# Patient Record
Sex: FEMALE | Race: WHITE | Employment: OTHER | URBAN - METROPOLITAN AREA
[De-identification: names, ages, dates, MRNs, and addresses within clinical notes are randomized per-mention and may not be internally consistent; named-entity substitution may affect disease eponyms.]

---

## 2017-01-01 ENCOUNTER — PATIENT OUTREACH (OUTPATIENT)
Dept: CASE MANAGEMENT | Age: 72
End: 2017-01-01

## 2017-01-01 ENCOUNTER — TELEPHONE (OUTPATIENT)
Dept: CARDIOLOGY CLINIC | Age: 72
End: 2017-01-01

## 2017-01-01 ENCOUNTER — HOSPITAL ENCOUNTER (INPATIENT)
Age: 72
LOS: 7 days | Discharge: NURSING, REHAB OR LONG-TERM CARE | DRG: 871 | End: 2017-09-16
Attending: EMERGENCY MEDICINE | Admitting: INTERNAL MEDICINE
Payer: MEDICARE

## 2017-01-01 ENCOUNTER — CLINICAL SUPPORT (OUTPATIENT)
Dept: CARDIOLOGY CLINIC | Age: 72
End: 2017-01-01

## 2017-01-01 ENCOUNTER — OFFICE VISIT (OUTPATIENT)
Dept: CARDIOLOGY CLINIC | Age: 72
End: 2017-01-01

## 2017-01-01 ENCOUNTER — HOSPITAL ENCOUNTER (OUTPATIENT)
Dept: LAB | Age: 72
Discharge: HOME | End: 2017-07-10
Payer: MEDICARE

## 2017-01-01 ENCOUNTER — APPOINTMENT (OUTPATIENT)
Dept: GENERAL RADIOLOGY | Age: 72
DRG: 871 | End: 2017-01-01
Attending: EMERGENCY MEDICINE
Payer: MEDICARE

## 2017-01-01 ENCOUNTER — APPOINTMENT (OUTPATIENT)
Dept: GENERAL RADIOLOGY | Age: 72
DRG: 871 | End: 2017-01-01
Attending: INTERNAL MEDICINE
Payer: MEDICARE

## 2017-01-01 VITALS
DIASTOLIC BLOOD PRESSURE: 73 MMHG | TEMPERATURE: 99.4 F | HEART RATE: 73 BPM | SYSTOLIC BLOOD PRESSURE: 136 MMHG | RESPIRATION RATE: 18 BRPM | HEIGHT: 66 IN | OXYGEN SATURATION: 97 % | WEIGHT: 173 LBS | BODY MASS INDEX: 27.8 KG/M2

## 2017-01-01 VITALS
HEART RATE: 73 BPM | HEIGHT: 66 IN | WEIGHT: 173 LBS | DIASTOLIC BLOOD PRESSURE: 56 MMHG | BODY MASS INDEX: 27.8 KG/M2 | SYSTOLIC BLOOD PRESSURE: 139 MMHG

## 2017-01-01 VITALS
HEART RATE: 73 BPM | DIASTOLIC BLOOD PRESSURE: 65 MMHG | WEIGHT: 156 LBS | BODY MASS INDEX: 25.07 KG/M2 | HEIGHT: 66 IN | SYSTOLIC BLOOD PRESSURE: 134 MMHG

## 2017-01-01 DIAGNOSIS — I34.2 NONRHEUMATIC MITRAL VALVE STENOSIS WITH INSUFFICIENCY: ICD-10-CM

## 2017-01-01 DIAGNOSIS — E78.2 MIXED HYPERLIPIDEMIA: ICD-10-CM

## 2017-01-01 DIAGNOSIS — I50.33 ACUTE ON CHRONIC DIASTOLIC CONGESTIVE HEART FAILURE (HCC): Primary | ICD-10-CM

## 2017-01-01 DIAGNOSIS — I25.10 ATHEROSCLEROSIS OF NATIVE CORONARY ARTERY OF NATIVE HEART WITHOUT ANGINA PECTORIS: ICD-10-CM

## 2017-01-01 DIAGNOSIS — I27.20 PULMONARY HTN (HCC): ICD-10-CM

## 2017-01-01 DIAGNOSIS — I48.0 PAROXYSMAL ATRIAL FIBRILLATION (HCC): Primary | ICD-10-CM

## 2017-01-01 DIAGNOSIS — I50.32 CHRONIC DIASTOLIC HEART FAILURE (HCC): Primary | ICD-10-CM

## 2017-01-01 DIAGNOSIS — R53.1 GENERALIZED WEAKNESS: ICD-10-CM

## 2017-01-01 DIAGNOSIS — I50.33 ACUTE ON CHRONIC DIASTOLIC CONGESTIVE HEART FAILURE (HCC): ICD-10-CM

## 2017-01-01 DIAGNOSIS — I34.0 NONRHEUMATIC MITRAL VALVE STENOSIS WITH INSUFFICIENCY: ICD-10-CM

## 2017-01-01 DIAGNOSIS — I10 ESSENTIAL HYPERTENSION, BENIGN: ICD-10-CM

## 2017-01-01 DIAGNOSIS — I35.0 NONRHEUMATIC AORTIC VALVE STENOSIS: ICD-10-CM

## 2017-01-01 DIAGNOSIS — I48.0 PAROXYSMAL ATRIAL FIBRILLATION (HCC): ICD-10-CM

## 2017-01-01 DIAGNOSIS — R50.9 ACUTE FEBRILE ILLNESS: ICD-10-CM

## 2017-01-01 DIAGNOSIS — I50.32 CHRONIC DIASTOLIC HEART FAILURE (HCC): ICD-10-CM

## 2017-01-01 DIAGNOSIS — K92.2 GASTROINTESTINAL HEMORRHAGE, UNSPECIFIED GASTROINTESTINAL HEMORRHAGE TYPE: Primary | ICD-10-CM

## 2017-01-01 DIAGNOSIS — M32.9 LUPUS (HCC): ICD-10-CM

## 2017-01-01 LAB
ABO + RH BLD: NORMAL
ALBUMIN SERPL-MCNC: 2.1 G/DL (ref 3.4–5)
ALBUMIN SERPL-MCNC: 2.2 G/DL (ref 3.4–5)
ALBUMIN/GLOB SERPL: 0.5 {RATIO} (ref 0.8–1.7)
ALBUMIN/GLOB SERPL: 0.6 {RATIO} (ref 0.8–1.7)
ALP SERPL-CCNC: 84 U/L (ref 45–117)
ALP SERPL-CCNC: 87 U/L (ref 45–117)
ALT SERPL-CCNC: 20 U/L (ref 13–56)
ALT SERPL-CCNC: 22 U/L (ref 13–56)
AMBIG ABBREV BMP8 DEFAULT, 977205: NORMAL
AMORPH CRY URNS QL MICRO: ABNORMAL
ANA SER QL: POSITIVE
ANION GAP BLD CALC-SCNC: 7 MMOL/L (ref 3–18)
ANION GAP SERPL CALC-SCNC: 10 MMOL/L (ref 3–18)
ANION GAP SERPL CALC-SCNC: 7 MMOL/L (ref 3–18)
ANION GAP SERPL CALC-SCNC: 7 MMOL/L (ref 3–18)
ANION GAP SERPL CALC-SCNC: 8 MMOL/L (ref 3–18)
ANION GAP SERPL CALC-SCNC: 9 MMOL/L (ref 3–18)
ANION GAP SERPL CALC-SCNC: 9 MMOL/L (ref 3–18)
APPEARANCE UR: ABNORMAL
APTT PPP: 51.9 SEC (ref 23–36.4)
AST SERPL-CCNC: 24 U/L (ref 15–37)
AST SERPL-CCNC: 31 U/L (ref 15–37)
ATRIAL RATE: 82 BPM
BACTERIA SPEC CULT: ABNORMAL
BACTERIA SPEC CULT: ABNORMAL
BACTERIA SPEC CULT: NORMAL
BACTERIA URNS QL MICRO: ABNORMAL /HPF
BASOPHILS # BLD: 0 K/UL (ref 0–0.1)
BASOPHILS NFR BLD: 0 % (ref 0–2)
BASOPHILS NFR BLD: 1 % (ref 0–2)
BILIRUB SERPL-MCNC: 0.2 MG/DL (ref 0.2–1)
BILIRUB SERPL-MCNC: 0.2 MG/DL (ref 0.2–1)
BILIRUB UR QL: NEGATIVE
BLD PROD TYP BPU: NORMAL
BLOOD GROUP ANTIBODIES SERPL: NORMAL
BNP SERPL-MCNC: ABNORMAL PG/ML (ref 0–900)
BPU ID: NORMAL
BUN SERPL-MCNC: 19 MG/DL (ref 7–18)
BUN SERPL-MCNC: 20 MG/DL (ref 7–18)
BUN SERPL-MCNC: 21 MG/DL (ref 7–18)
BUN SERPL-MCNC: 29 MG/DL (ref 7–18)
BUN SERPL-MCNC: 35 MG/DL (ref 7–18)
BUN SERPL-MCNC: 37 MG/DL (ref 7–18)
BUN SERPL-MCNC: 40 MG/DL (ref 8–27)
BUN SERPL-MCNC: 47 MG/DL (ref 7–18)
BUN SERPL-MCNC: 92 MG/DL (ref 8–27)
BUN/CREAT SERPL: 11 (ref 12–20)
BUN/CREAT SERPL: 11 (ref 12–20)
BUN/CREAT SERPL: 12 (ref 12–20)
BUN/CREAT SERPL: 13 (ref 12–20)
BUN/CREAT SERPL: 14 (ref 12–20)
BUN/CREAT SERPL: 22 (ref 12–20)
BUN/CREAT SERPL: 26 (ref 12–28)
BUN/CREAT SERPL: 28 (ref 11–26)
C3 SERPL-MCNC: 104 MG/DL (ref 82–167)
C4 SERPL-MCNC: 19 MG/DL (ref 14–44)
CALCIUM SERPL-MCNC: 7.3 MG/DL (ref 8.5–10.1)
CALCIUM SERPL-MCNC: 7.6 MG/DL (ref 8.5–10.1)
CALCIUM SERPL-MCNC: 7.8 MG/DL (ref 8.7–10.3)
CALCIUM SERPL-MCNC: 7.9 MG/DL (ref 8.5–10.1)
CALCIUM SERPL-MCNC: 8 MG/DL (ref 8.5–10.1)
CALCIUM SERPL-MCNC: 8.1 MG/DL (ref 8.5–10.1)
CALCIUM SERPL-MCNC: 8.1 MG/DL (ref 8.5–10.1)
CALCIUM SERPL-MCNC: 8.2 MG/DL (ref 8.5–10.1)
CALCIUM SERPL-MCNC: 8.3 MG/DL (ref 8.5–10.1)
CALCIUM SERPL-MCNC: 8.3 MG/DL (ref 8.5–10.1)
CALCIUM SERPL-MCNC: 8.4 MG/DL (ref 8.7–10.3)
CALCULATED P AXIS, ECG09: 105 DEGREES
CALCULATED R AXIS, ECG10: -5 DEGREES
CALCULATED T AXIS, ECG11: -9 DEGREES
CALLED TO:,BCALL1: NORMAL
CHLORIDE SERPL-SCNC: 104 MMOL/L (ref 96–106)
CHLORIDE SERPL-SCNC: 108 MMOL/L (ref 100–108)
CHLORIDE SERPL-SCNC: 110 MMOL/L (ref 100–108)
CHLORIDE SERPL-SCNC: 112 MMOL/L (ref 100–108)
CHLORIDE SERPL-SCNC: 113 MMOL/L (ref 100–108)
CHLORIDE SERPL-SCNC: 114 MMOL/L (ref 100–108)
CHLORIDE SERPL-SCNC: 115 MMOL/L (ref 100–108)
CHLORIDE SERPL-SCNC: 115 MMOL/L (ref 100–108)
CHLORIDE SERPL-SCNC: 93 MMOL/L (ref 96–106)
CHOLEST SERPL-MCNC: 94 MG/DL
CK MB CFR SERPL CALC: 1 % (ref 0–4)
CK MB CFR SERPL CALC: NORMAL % (ref 0–4)
CK MB SERPL-MCNC: 1.4 NG/ML (ref 5–25)
CK MB SERPL-MCNC: <1 NG/ML (ref 5–25)
CK SERPL-CCNC: 146 U/L (ref 26–192)
CK SERPL-CCNC: 58 U/L (ref 26–192)
CO2 SERPL-SCNC: 20 MMOL/L (ref 21–32)
CO2 SERPL-SCNC: 21 MMOL/L (ref 21–32)
CO2 SERPL-SCNC: 22 MMOL/L (ref 21–32)
CO2 SERPL-SCNC: 23 MMOL/L (ref 18–29)
CO2 SERPL-SCNC: 23 MMOL/L (ref 21–32)
CO2 SERPL-SCNC: 24 MMOL/L (ref 21–32)
CO2 SERPL-SCNC: 24 MMOL/L (ref 21–32)
CO2 SERPL-SCNC: 27 MMOL/L (ref 18–29)
CO2 SERPL-SCNC: 28 MMOL/L (ref 21–32)
COLOR UR: YELLOW
CREAT SERPL-MCNC: 1.44 MG/DL (ref 0.57–1)
CREAT SERPL-MCNC: 1.55 MG/DL (ref 0.6–1.3)
CREAT SERPL-MCNC: 1.58 MG/DL (ref 0.6–1.3)
CREAT SERPL-MCNC: 1.63 MG/DL (ref 0.6–1.3)
CREAT SERPL-MCNC: 1.77 MG/DL (ref 0.6–1.3)
CREAT SERPL-MCNC: 1.83 MG/DL (ref 0.6–1.3)
CREAT SERPL-MCNC: 2.07 MG/DL (ref 0.6–1.3)
CREAT SERPL-MCNC: 2.09 MG/DL (ref 0.6–1.3)
CREAT SERPL-MCNC: 2.71 MG/DL (ref 0.6–1.3)
CREAT SERPL-MCNC: 2.92 MG/DL (ref 0.6–1.3)
CREAT SERPL-MCNC: 3.53 MG/DL (ref 0.57–1)
CREAT UR-MCNC: 163 MG/DL (ref 30–125)
CROSSMATCH RESULT,%XM: NORMAL
CRP SERPL-MCNC: 17.8 MG/DL (ref 0–0.3)
DIAGNOSIS, 93000: NORMAL
DIFFERENTIAL METHOD BLD: ABNORMAL
DSDNA AB SER-ACNC: 11 IU/ML (ref 0–9)
EOSINOPHIL # BLD: 0 K/UL (ref 0–0.4)
EOSINOPHIL # BLD: 0 K/UL (ref 0–0.4)
EOSINOPHIL # BLD: 0.1 K/UL (ref 0–0.4)
EOSINOPHIL NFR BLD: 0 % (ref 0–5)
EOSINOPHIL NFR BLD: 0 % (ref 0–5)
EOSINOPHIL NFR BLD: 1 % (ref 0–5)
EOSINOPHIL NFR BLD: 1 % (ref 0–5)
EOSINOPHIL NFR BLD: 2 % (ref 0–5)
EPITH CASTS URNS QL MICRO: ABNORMAL /LPF (ref 0–5)
ERYTHROCYTE [DISTWIDTH] IN BLOOD BY AUTOMATED COUNT: 18.3 % (ref 11.6–14.5)
ERYTHROCYTE [DISTWIDTH] IN BLOOD BY AUTOMATED COUNT: 18.5 % (ref 11.6–14.5)
ERYTHROCYTE [DISTWIDTH] IN BLOOD BY AUTOMATED COUNT: 18.6 % (ref 11.6–14.5)
ERYTHROCYTE [DISTWIDTH] IN BLOOD BY AUTOMATED COUNT: 18.8 % (ref 11.6–14.5)
ERYTHROCYTE [DISTWIDTH] IN BLOOD BY AUTOMATED COUNT: 19 % (ref 11.6–14.5)
ERYTHROCYTE [DISTWIDTH] IN BLOOD BY AUTOMATED COUNT: 19.1 % (ref 11.6–14.5)
ERYTHROCYTE [SEDIMENTATION RATE] IN BLOOD: >140 MM/HR (ref 0–30)
FERRITIN SERPL-MCNC: 104 NG/ML (ref 8–388)
FLUAV AG NPH QL IA: NEGATIVE
FLUBV AG NOSE QL IA: NEGATIVE
FOLATE SERPL-MCNC: >20 NG/ML (ref 3.1–17.5)
GLOBULIN SER CALC-MCNC: 3.7 G/DL (ref 2–4)
GLOBULIN SER CALC-MCNC: 4.6 G/DL (ref 2–4)
GLUCOSE SERPL-MCNC: 109 MG/DL (ref 74–99)
GLUCOSE SERPL-MCNC: 117 MG/DL (ref 74–99)
GLUCOSE SERPL-MCNC: 122 MG/DL (ref 65–99)
GLUCOSE SERPL-MCNC: 137 MG/DL (ref 74–99)
GLUCOSE SERPL-MCNC: 169 MG/DL (ref 65–99)
GLUCOSE SERPL-MCNC: 68 MG/DL (ref 74–99)
GLUCOSE SERPL-MCNC: 76 MG/DL (ref 74–99)
GLUCOSE SERPL-MCNC: 83 MG/DL (ref 74–99)
GLUCOSE SERPL-MCNC: 85 MG/DL (ref 74–99)
GLUCOSE SERPL-MCNC: 87 MG/DL (ref 74–99)
GLUCOSE SERPL-MCNC: 92 MG/DL (ref 74–99)
GLUCOSE UR STRIP.AUTO-MCNC: NEGATIVE MG/DL
HAPTOGLOB SERPL-MCNC: 266 MG/DL (ref 34–200)
HCT VFR BLD AUTO: 24.6 % (ref 35–45)
HCT VFR BLD AUTO: 25.1 % (ref 35–45)
HCT VFR BLD AUTO: 26.2 % (ref 35–45)
HCT VFR BLD AUTO: 28.8 % (ref 35–45)
HCT VFR BLD AUTO: 29.3 % (ref 35–45)
HCT VFR BLD AUTO: 29.9 % (ref 35–45)
HCT VFR BLD AUTO: 30 % (ref 35–45)
HCT VFR BLD AUTO: 30.5 % (ref 35–45)
HDLC SERPL-MCNC: 41 MG/DL (ref 40–60)
HDLC SERPL: 2.3 {RATIO} (ref 0–5)
HGB BLD-MCNC: 7 G/DL (ref 12–16)
HGB BLD-MCNC: 7.1 G/DL (ref 12–16)
HGB BLD-MCNC: 7.6 G/DL (ref 12–16)
HGB BLD-MCNC: 8.5 G/DL (ref 12–16)
HGB BLD-MCNC: 8.6 G/DL (ref 12–16)
HGB BLD-MCNC: 8.6 G/DL (ref 12–16)
HGB BLD-MCNC: 8.7 G/DL (ref 12–16)
HGB BLD-MCNC: 8.8 G/DL (ref 12–16)
HGB UR QL STRIP: NEGATIVE
HYALINE CASTS URNS QL MICRO: ABNORMAL /LPF (ref 0–2)
INR PPP: 2.2 (ref 0.8–1.2)
INR PPP: 2.5 (ref 0.8–1.2)
INR PPP: 2.8 (ref 0.8–1.2)
INR PPP: 2.9 (ref 0.8–1.2)
INR PPP: 3 (ref 0.8–1.2)
INR PPP: 3.4 (ref 0.8–1.2)
INR PPP: 3.7 (ref 0.8–1.2)
INR PPP: 4.3 (ref 0.8–1.2)
INR PPP: 4.4 (ref 0.8–1.2)
IRON SATN MFR SERPL: 6 %
IRON SERPL-MCNC: 12 UG/DL (ref 50–175)
KETONES UR QL STRIP.AUTO: NEGATIVE MG/DL
LACTATE BLD-SCNC: 0.8 MMOL/L (ref 0.4–2)
LDLC SERPL CALC-MCNC: 36.4 MG/DL (ref 0–100)
LEUKOCYTE ESTERASE UR QL STRIP.AUTO: NEGATIVE
LIPID PROFILE,FLP: NORMAL
LYMPHOCYTES # BLD: 1.5 K/UL (ref 0.9–3.6)
LYMPHOCYTES # BLD: 1.6 K/UL (ref 0.9–3.6)
LYMPHOCYTES # BLD: 1.7 K/UL (ref 0.9–3.6)
LYMPHOCYTES NFR BLD: 18 % (ref 21–52)
LYMPHOCYTES NFR BLD: 24 % (ref 21–52)
LYMPHOCYTES NFR BLD: 30 % (ref 21–52)
LYMPHOCYTES NFR BLD: 34 % (ref 21–52)
LYMPHOCYTES NFR BLD: 37 % (ref 21–52)
MAGNESIUM SERPL-MCNC: 2.3 MG/DL (ref 1.6–2.6)
MAGNESIUM SERPL-MCNC: 2.8 MG/DL (ref 1.6–2.3)
MCH RBC QN AUTO: 23.2 PG (ref 24–34)
MCH RBC QN AUTO: 23.5 PG (ref 24–34)
MCH RBC QN AUTO: 23.6 PG (ref 24–34)
MCH RBC QN AUTO: 24 PG (ref 24–34)
MCH RBC QN AUTO: 24.1 PG (ref 24–34)
MCH RBC QN AUTO: 24.2 PG (ref 24–34)
MCHC RBC AUTO-ENTMCNC: 28.5 G/DL (ref 31–37)
MCHC RBC AUTO-ENTMCNC: 28.8 G/DL (ref 31–37)
MCHC RBC AUTO-ENTMCNC: 29 G/DL (ref 31–37)
MCHC RBC AUTO-ENTMCNC: 29.3 G/DL (ref 31–37)
MCHC RBC AUTO-ENTMCNC: 29.4 G/DL (ref 31–37)
MCHC RBC AUTO-ENTMCNC: 29.5 G/DL (ref 31–37)
MCV RBC AUTO: 80.9 FL (ref 74–97)
MCV RBC AUTO: 81.3 FL (ref 74–97)
MCV RBC AUTO: 81.9 FL (ref 74–97)
MCV RBC AUTO: 82 FL (ref 74–97)
MCV RBC AUTO: 82.1 FL (ref 74–97)
MCV RBC AUTO: 82.1 FL (ref 74–97)
MONOCYTES # BLD: 0.4 K/UL (ref 0.05–1.2)
MONOCYTES # BLD: 0.5 K/UL (ref 0.05–1.2)
MONOCYTES # BLD: 0.5 K/UL (ref 0.05–1.2)
MONOCYTES # BLD: 0.7 K/UL (ref 0.05–1.2)
MONOCYTES # BLD: 0.7 K/UL (ref 0.05–1.2)
MONOCYTES NFR BLD: 10 % (ref 3–10)
MONOCYTES NFR BLD: 11 % (ref 3–10)
MONOCYTES NFR BLD: 8 % (ref 3–10)
NEUTS SEG # BLD: 2.1 K/UL (ref 1.8–8)
NEUTS SEG # BLD: 2.8 K/UL (ref 1.8–8)
NEUTS SEG # BLD: 3.2 K/UL (ref 1.8–8)
NEUTS SEG # BLD: 4 K/UL (ref 1.8–8)
NEUTS SEG # BLD: 6.4 K/UL (ref 1.8–8)
NEUTS SEG NFR BLD: 50 % (ref 40–73)
NEUTS SEG NFR BLD: 55 % (ref 40–73)
NEUTS SEG NFR BLD: 59 % (ref 40–73)
NEUTS SEG NFR BLD: 65 % (ref 40–73)
NEUTS SEG NFR BLD: 74 % (ref 40–73)
NITRITE UR QL STRIP.AUTO: NEGATIVE
P-R INTERVAL, ECG05: 240 MS
PERIPHERAL SMEAR,PSM: NORMAL
PH UR STRIP: 5 [PH] (ref 5–8)
PLATELET # BLD AUTO: 223 K/UL (ref 135–420)
PLATELET # BLD AUTO: 238 K/UL (ref 135–420)
PLATELET # BLD AUTO: 248 K/UL (ref 135–420)
PLATELET # BLD AUTO: 274 K/UL (ref 135–420)
PLATELET # BLD AUTO: 278 K/UL (ref 135–420)
PLATELET # BLD AUTO: 292 K/UL (ref 135–420)
PMV BLD AUTO: 10 FL (ref 9.2–11.8)
PMV BLD AUTO: 10.3 FL (ref 9.2–11.8)
PMV BLD AUTO: 9.3 FL (ref 9.2–11.8)
PMV BLD AUTO: 9.5 FL (ref 9.2–11.8)
PMV BLD AUTO: 9.6 FL (ref 9.2–11.8)
PMV BLD AUTO: 9.9 FL (ref 9.2–11.8)
POTASSIUM SERPL-SCNC: 2.8 MMOL/L (ref 3.5–5.2)
POTASSIUM SERPL-SCNC: 3.4 MMOL/L (ref 3.5–5.5)
POTASSIUM SERPL-SCNC: 3.5 MMOL/L (ref 3.5–5.5)
POTASSIUM SERPL-SCNC: 3.6 MMOL/L (ref 3.5–5.5)
POTASSIUM SERPL-SCNC: 3.7 MMOL/L (ref 3.5–5.5)
POTASSIUM SERPL-SCNC: 3.9 MMOL/L (ref 3.5–5.5)
POTASSIUM SERPL-SCNC: 4.1 MMOL/L (ref 3.5–5.2)
PROCALCITONIN SERPL-MCNC: 0.54 NG/ML (ref 0–0.08)
PROT SERPL-MCNC: 5.8 G/DL (ref 6.4–8.2)
PROT SERPL-MCNC: 6.8 G/DL (ref 6.4–8.2)
PROT UR STRIP-MCNC: >1000 MG/DL
PROTHROMBIN TIME: 23.9 SEC (ref 11.5–15.2)
PROTHROMBIN TIME: 26.5 SEC (ref 11.5–15.2)
PROTHROMBIN TIME: 28.8 SEC (ref 11.5–15.2)
PROTHROMBIN TIME: 29.8 SEC (ref 11.5–15.2)
PROTHROMBIN TIME: 30.7 SEC (ref 11.5–15.2)
PROTHROMBIN TIME: 33.5 SEC (ref 11.5–15.2)
PROTHROMBIN TIME: 35.8 SEC (ref 11.5–15.2)
PROTHROMBIN TIME: 40.5 SEC (ref 11.5–15.2)
PROTHROMBIN TIME: 41.3 SEC (ref 11.5–15.2)
Q-T INTERVAL, ECG07: 428 MS
QRS DURATION, ECG06: 116 MS
QTC CALCULATION (BEZET), ECG08: 500 MS
RBC # BLD AUTO: 3.24 M/UL (ref 4.2–5.3)
RBC # BLD AUTO: 3.51 M/UL (ref 4.2–5.3)
RBC # BLD AUTO: 3.57 M/UL (ref 4.2–5.3)
RBC # BLD AUTO: 3.65 M/UL (ref 4.2–5.3)
RBC # BLD AUTO: 3.66 M/UL (ref 4.2–5.3)
RBC # BLD AUTO: 3.75 M/UL (ref 4.2–5.3)
RBC #/AREA URNS HPF: ABNORMAL /HPF (ref 0–5)
RETICS/RBC NFR AUTO: 1.1 % (ref 0.5–2.3)
SEE BELOW:, 164879: ABNORMAL
SERVICE CMNT-IMP: ABNORMAL
SERVICE CMNT-IMP: NORMAL
SODIUM SERPL-SCNC: 139 MMOL/L (ref 136–145)
SODIUM SERPL-SCNC: 141 MMOL/L (ref 134–144)
SODIUM SERPL-SCNC: 142 MMOL/L (ref 136–145)
SODIUM SERPL-SCNC: 143 MMOL/L (ref 136–145)
SODIUM SERPL-SCNC: 144 MMOL/L (ref 136–145)
SODIUM SERPL-SCNC: 144 MMOL/L (ref 136–145)
SODIUM SERPL-SCNC: 145 MMOL/L (ref 136–145)
SODIUM SERPL-SCNC: 145 MMOL/L (ref 136–145)
SODIUM SERPL-SCNC: 146 MMOL/L (ref 134–144)
SODIUM UR-SCNC: 22 MMOL/L (ref 20–110)
SP GR UR REFRACTOMETRY: 1.02 (ref 1–1.03)
SPECIMEN EXP DATE BLD: NORMAL
STATUS OF UNIT,%ST: NORMAL
TIBC SERPL-MCNC: 198 UG/DL (ref 250–450)
TRIGL SERPL-MCNC: 83 MG/DL (ref ?–150)
TROPONIN I SERPL-MCNC: 0.03 NG/ML (ref 0–0.04)
TROPONIN I SERPL-MCNC: 0.03 NG/ML (ref 0–0.04)
TSH SERPL DL<=0.005 MIU/L-ACNC: 1.51 UIU/ML (ref 0.45–4.5)
TSH SERPL DL<=0.05 MIU/L-ACNC: 1.77 UIU/ML (ref 0.36–3.74)
UNIT DIVISION, %UDIV: 0
UROBILINOGEN UR QL STRIP.AUTO: 0.2 EU/DL (ref 0.2–1)
VANCOMYCIN SERPL-MCNC: 12.9 UG/ML (ref 5–40)
VANCOMYCIN SERPL-MCNC: 20.3 UG/ML (ref 5–40)
VENTRICULAR RATE, ECG03: 82 BPM
VIT B12 SERPL-MCNC: 1724 PG/ML (ref 211–911)
VLDLC SERPL CALC-MCNC: 16.6 MG/DL
WBC # BLD AUTO: 4.1 K/UL (ref 4.6–13.2)
WBC # BLD AUTO: 4.9 K/UL (ref 4.6–13.2)
WBC # BLD AUTO: 5.1 K/UL (ref 4.6–13.2)
WBC # BLD AUTO: 5.4 K/UL (ref 4.6–13.2)
WBC # BLD AUTO: 6.2 K/UL (ref 4.6–13.2)
WBC # BLD AUTO: 8.6 K/UL (ref 4.6–13.2)
WBC URNS QL MICRO: ABNORMAL /HPF (ref 0–4)

## 2017-01-01 PROCEDURE — 74011250637 HC RX REV CODE- 250/637: Performed by: INTERNAL MEDICINE

## 2017-01-01 PROCEDURE — 85027 COMPLETE CBC AUTOMATED: CPT | Performed by: INTERNAL MEDICINE

## 2017-01-01 PROCEDURE — 80202 ASSAY OF VANCOMYCIN: CPT | Performed by: INTERNAL MEDICINE

## 2017-01-01 PROCEDURE — 74011250637 HC RX REV CODE- 250/637: Performed by: NURSE PRACTITIONER

## 2017-01-01 PROCEDURE — 65270000029 HC RM PRIVATE

## 2017-01-01 PROCEDURE — 86225 DNA ANTIBODY NATIVE: CPT | Performed by: INTERNAL MEDICINE

## 2017-01-01 PROCEDURE — 85018 HEMOGLOBIN: CPT | Performed by: INTERNAL MEDICINE

## 2017-01-01 PROCEDURE — 65660000000 HC RM CCU STEPDOWN

## 2017-01-01 PROCEDURE — 85610 PROTHROMBIN TIME: CPT | Performed by: INTERNAL MEDICINE

## 2017-01-01 PROCEDURE — 97162 PT EVAL MOD COMPLEX 30 MIN: CPT

## 2017-01-01 PROCEDURE — 74011250637 HC RX REV CODE- 250/637: Performed by: HOSPITALIST

## 2017-01-01 PROCEDURE — 82607 VITAMIN B-12: CPT | Performed by: HOSPITALIST

## 2017-01-01 PROCEDURE — 80048 BASIC METABOLIC PNL TOTAL CA: CPT | Performed by: INTERNAL MEDICINE

## 2017-01-01 PROCEDURE — 36415 COLL VENOUS BLD VENIPUNCTURE: CPT | Performed by: INTERNAL MEDICINE

## 2017-01-01 PROCEDURE — 85025 COMPLETE CBC W/AUTO DIFF WBC: CPT | Performed by: INTERNAL MEDICINE

## 2017-01-01 PROCEDURE — 99285 EMERGENCY DEPT VISIT HI MDM: CPT

## 2017-01-01 PROCEDURE — 86900 BLOOD TYPING SEROLOGIC ABO: CPT | Performed by: EMERGENCY MEDICINE

## 2017-01-01 PROCEDURE — 96360 HYDRATION IV INFUSION INIT: CPT

## 2017-01-01 PROCEDURE — 74011250636 HC RX REV CODE- 250/636: Performed by: INTERNAL MEDICINE

## 2017-01-01 PROCEDURE — 84443 ASSAY THYROID STIM HORMONE: CPT | Performed by: EMERGENCY MEDICINE

## 2017-01-01 PROCEDURE — 71010 XR CHEST PORT: CPT

## 2017-01-01 PROCEDURE — 84300 ASSAY OF URINE SODIUM: CPT | Performed by: INTERNAL MEDICINE

## 2017-01-01 PROCEDURE — 36415 COLL VENOUS BLD VENIPUNCTURE: CPT | Performed by: PLASTIC SURGERY

## 2017-01-01 PROCEDURE — 83880 ASSAY OF NATRIURETIC PEPTIDE: CPT | Performed by: INTERNAL MEDICINE

## 2017-01-01 PROCEDURE — 81001 URINALYSIS AUTO W/SCOPE: CPT | Performed by: EMERGENCY MEDICINE

## 2017-01-01 PROCEDURE — 83735 ASSAY OF MAGNESIUM: CPT | Performed by: EMERGENCY MEDICINE

## 2017-01-01 PROCEDURE — 97110 THERAPEUTIC EXERCISES: CPT

## 2017-01-01 PROCEDURE — 97535 SELF CARE MNGMENT TRAINING: CPT

## 2017-01-01 PROCEDURE — 80061 LIPID PANEL: CPT | Performed by: HOSPITALIST

## 2017-01-01 PROCEDURE — 30233N1 TRANSFUSION OF NONAUTOLOGOUS RED BLOOD CELLS INTO PERIPHERAL VEIN, PERCUTANEOUS APPROACH: ICD-10-PCS | Performed by: INTERNAL MEDICINE

## 2017-01-01 PROCEDURE — 85730 THROMBOPLASTIN TIME PARTIAL: CPT | Performed by: EMERGENCY MEDICINE

## 2017-01-01 PROCEDURE — 97530 THERAPEUTIC ACTIVITIES: CPT

## 2017-01-01 PROCEDURE — 85025 COMPLETE CBC W/AUTO DIFF WBC: CPT | Performed by: HOSPITALIST

## 2017-01-01 PROCEDURE — 93005 ELECTROCARDIOGRAM TRACING: CPT

## 2017-01-01 PROCEDURE — 82570 ASSAY OF URINE CREATININE: CPT | Performed by: INTERNAL MEDICINE

## 2017-01-01 PROCEDURE — 83540 ASSAY OF IRON: CPT | Performed by: INTERNAL MEDICINE

## 2017-01-01 PROCEDURE — 82728 ASSAY OF FERRITIN: CPT | Performed by: HOSPITALIST

## 2017-01-01 PROCEDURE — 85610 PROTHROMBIN TIME: CPT | Performed by: HOSPITALIST

## 2017-01-01 PROCEDURE — 86038 ANTINUCLEAR ANTIBODIES: CPT | Performed by: INTERNAL MEDICINE

## 2017-01-01 PROCEDURE — 83605 ASSAY OF LACTIC ACID: CPT

## 2017-01-01 PROCEDURE — 85652 RBC SED RATE AUTOMATED: CPT | Performed by: INTERNAL MEDICINE

## 2017-01-01 PROCEDURE — 87086 URINE CULTURE/COLONY COUNT: CPT | Performed by: HOSPITALIST

## 2017-01-01 PROCEDURE — 36415 COLL VENOUS BLD VENIPUNCTURE: CPT | Performed by: HOSPITALIST

## 2017-01-01 PROCEDURE — 80053 COMPREHEN METABOLIC PANEL: CPT | Performed by: INTERNAL MEDICINE

## 2017-01-01 PROCEDURE — 97166 OT EVAL MOD COMPLEX 45 MIN: CPT

## 2017-01-01 PROCEDURE — 87040 BLOOD CULTURE FOR BACTERIA: CPT | Performed by: EMERGENCY MEDICINE

## 2017-01-01 PROCEDURE — 74011250636 HC RX REV CODE- 250/636: Performed by: HOSPITALIST

## 2017-01-01 PROCEDURE — 86160 COMPLEMENT ANTIGEN: CPT | Performed by: INTERNAL MEDICINE

## 2017-01-01 PROCEDURE — 80048 BASIC METABOLIC PNL TOTAL CA: CPT | Performed by: PLASTIC SURGERY

## 2017-01-01 PROCEDURE — 80048 BASIC METABOLIC PNL TOTAL CA: CPT | Performed by: HOSPITALIST

## 2017-01-01 PROCEDURE — 84145 PROCALCITONIN (PCT): CPT | Performed by: INTERNAL MEDICINE

## 2017-01-01 PROCEDURE — 74011250637 HC RX REV CODE- 250/637: Performed by: EMERGENCY MEDICINE

## 2017-01-01 PROCEDURE — 80053 COMPREHEN METABOLIC PANEL: CPT | Performed by: EMERGENCY MEDICINE

## 2017-01-01 PROCEDURE — 74011000250 HC RX REV CODE- 250: Performed by: INTERNAL MEDICINE

## 2017-01-01 PROCEDURE — 86140 C-REACTIVE PROTEIN: CPT | Performed by: INTERNAL MEDICINE

## 2017-01-01 PROCEDURE — 85025 COMPLETE CBC W/AUTO DIFF WBC: CPT | Performed by: EMERGENCY MEDICINE

## 2017-01-01 PROCEDURE — 87493 C DIFF AMPLIFIED PROBE: CPT | Performed by: EMERGENCY MEDICINE

## 2017-01-01 PROCEDURE — 86920 COMPATIBILITY TEST SPIN: CPT | Performed by: EMERGENCY MEDICINE

## 2017-01-01 PROCEDURE — 85610 PROTHROMBIN TIME: CPT | Performed by: EMERGENCY MEDICINE

## 2017-01-01 PROCEDURE — 82550 ASSAY OF CK (CPK): CPT | Performed by: EMERGENCY MEDICINE

## 2017-01-01 PROCEDURE — 82550 ASSAY OF CK (CPK): CPT | Performed by: HOSPITALIST

## 2017-01-01 PROCEDURE — 85045 AUTOMATED RETICULOCYTE COUNT: CPT | Performed by: INTERNAL MEDICINE

## 2017-01-01 PROCEDURE — 87804 INFLUENZA ASSAY W/OPTIC: CPT | Performed by: EMERGENCY MEDICINE

## 2017-01-01 PROCEDURE — P9016 RBC LEUKOCYTES REDUCED: HCPCS | Performed by: EMERGENCY MEDICINE

## 2017-01-01 PROCEDURE — 74011250636 HC RX REV CODE- 250/636: Performed by: EMERGENCY MEDICINE

## 2017-01-01 PROCEDURE — 97116 GAIT TRAINING THERAPY: CPT

## 2017-01-01 PROCEDURE — 83010 ASSAY OF HAPTOGLOBIN QUANT: CPT | Performed by: INTERNAL MEDICINE

## 2017-01-01 PROCEDURE — 36430 TRANSFUSION BLD/BLD COMPNT: CPT

## 2017-01-01 RX ORDER — SODIUM CHLORIDE 0.9 % (FLUSH) 0.9 %
5-10 SYRINGE (ML) INJECTION AS NEEDED
Status: DISCONTINUED | OUTPATIENT
Start: 2017-01-01 | End: 2017-01-01

## 2017-01-01 RX ORDER — LORATADINE 10 MG/1
10 TABLET ORAL
COMMUNITY

## 2017-01-01 RX ORDER — METOLAZONE 2.5 MG/1
2.5 TABLET ORAL
Qty: 30 TAB | Refills: 1 | Status: SHIPPED | OUTPATIENT
Start: 2017-01-01 | End: 2017-01-01 | Stop reason: ALTCHOICE

## 2017-01-01 RX ORDER — WARFARIN 1 MG/1
TABLET ORAL
Qty: 30 TAB | Refills: 0 | Status: SHIPPED
Start: 2017-01-01

## 2017-01-01 RX ORDER — FUROSEMIDE 10 MG/ML
40 INJECTION INTRAMUSCULAR; INTRAVENOUS DAILY
Status: DISCONTINUED | OUTPATIENT
Start: 2017-01-01 | End: 2017-01-01

## 2017-01-01 RX ORDER — PROPAFENONE HYDROCHLORIDE 225 MG/1
225 TABLET, FILM COATED ORAL 3 TIMES DAILY
Status: DISCONTINUED | OUTPATIENT
Start: 2017-01-01 | End: 2017-01-01 | Stop reason: HOSPADM

## 2017-01-01 RX ORDER — ACETAMINOPHEN 325 MG/1
650 TABLET ORAL
Status: DISCONTINUED | OUTPATIENT
Start: 2017-01-01 | End: 2017-01-01

## 2017-01-01 RX ORDER — ACETAMINOPHEN 500 MG
1000 TABLET ORAL
Status: COMPLETED | OUTPATIENT
Start: 2017-01-01 | End: 2017-01-01

## 2017-01-01 RX ORDER — LORATADINE 10 MG/1
10 TABLET ORAL DAILY
Status: DISCONTINUED | OUTPATIENT
Start: 2017-01-01 | End: 2017-01-01 | Stop reason: HOSPADM

## 2017-01-01 RX ORDER — FUROSEMIDE 40 MG/1
40 TABLET ORAL DAILY
Status: DISCONTINUED | OUTPATIENT
Start: 2017-01-01 | End: 2017-01-01 | Stop reason: HOSPADM

## 2017-01-01 RX ORDER — IPRATROPIUM BROMIDE AND ALBUTEROL SULFATE 2.5; .5 MG/3ML; MG/3ML
3 SOLUTION RESPIRATORY (INHALATION)
Status: DISCONTINUED | OUTPATIENT
Start: 2017-01-01 | End: 2017-01-01 | Stop reason: HOSPADM

## 2017-01-01 RX ORDER — SODIUM CHLORIDE 9 MG/ML
50 INJECTION, SOLUTION INTRAVENOUS CONTINUOUS
Status: DISCONTINUED | OUTPATIENT
Start: 2017-01-01 | End: 2017-01-01

## 2017-01-01 RX ORDER — POTASSIUM CHLORIDE 20 MEQ/1
20 TABLET, EXTENDED RELEASE ORAL
Status: COMPLETED | OUTPATIENT
Start: 2017-01-01 | End: 2017-01-01

## 2017-01-01 RX ORDER — METOPROLOL TARTRATE 50 MG/1
100 TABLET ORAL 2 TIMES DAILY
Status: DISCONTINUED | OUTPATIENT
Start: 2017-01-01 | End: 2017-01-01 | Stop reason: HOSPADM

## 2017-01-01 RX ORDER — CIPROFLOXACIN 2 MG/ML
400 INJECTION, SOLUTION INTRAVENOUS EVERY 24 HOURS
Status: DISCONTINUED | OUTPATIENT
Start: 2017-01-01 | End: 2017-01-01

## 2017-01-01 RX ORDER — HYDRALAZINE HYDROCHLORIDE 10 MG/1
10 TABLET, FILM COATED ORAL 3 TIMES DAILY
Qty: 270 TAB | Refills: 2 | Status: SHIPPED | OUTPATIENT
Start: 2017-01-01 | End: 2017-01-01

## 2017-01-01 RX ORDER — ACETAMINOPHEN 325 MG/1
650 TABLET ORAL
Status: DISCONTINUED | OUTPATIENT
Start: 2017-01-01 | End: 2017-01-01 | Stop reason: HOSPADM

## 2017-01-01 RX ORDER — CIPROFLOXACIN 2 MG/ML
200 INJECTION, SOLUTION INTRAVENOUS EVERY 12 HOURS
Status: DISCONTINUED | OUTPATIENT
Start: 2017-01-01 | End: 2017-01-01 | Stop reason: DRUGHIGH

## 2017-01-01 RX ORDER — METOPROLOL TARTRATE 100 MG/1
100 TABLET ORAL 2 TIMES DAILY
Qty: 60 TAB | Refills: 1 | Status: SHIPPED | OUTPATIENT
Start: 2017-01-01

## 2017-01-01 RX ORDER — SPIRONOLACTONE 25 MG/1
25 TABLET ORAL DAILY
Qty: 30 TAB | Refills: 0 | Status: SHIPPED
Start: 2017-01-01

## 2017-01-01 RX ORDER — FUROSEMIDE 10 MG/ML
40 INJECTION INTRAMUSCULAR; INTRAVENOUS 2 TIMES DAILY
Status: DISCONTINUED | OUTPATIENT
Start: 2017-01-01 | End: 2017-01-01

## 2017-01-01 RX ORDER — SODIUM CHLORIDE 9 MG/ML
250 INJECTION, SOLUTION INTRAVENOUS AS NEEDED
Status: DISCONTINUED | OUTPATIENT
Start: 2017-01-01 | End: 2017-01-01 | Stop reason: HOSPADM

## 2017-01-01 RX ORDER — SPIRONOLACTONE 25 MG/1
25 TABLET ORAL DAILY
Status: DISCONTINUED | OUTPATIENT
Start: 2017-01-01 | End: 2017-01-01 | Stop reason: HOSPADM

## 2017-01-01 RX ORDER — PROPAFENONE HYDROCHLORIDE 225 MG/1
225 TABLET, FILM COATED ORAL 3 TIMES DAILY
Qty: 270 TAB | Refills: 3 | Status: SHIPPED | OUTPATIENT
Start: 2017-01-01

## 2017-01-01 RX ORDER — SERTRALINE HYDROCHLORIDE 50 MG/1
100 TABLET, FILM COATED ORAL DAILY
Status: DISCONTINUED | OUTPATIENT
Start: 2017-01-01 | End: 2017-01-01 | Stop reason: HOSPADM

## 2017-01-01 RX ORDER — FUROSEMIDE 40 MG/1
40 TABLET ORAL DAILY
Qty: 30 TAB | Refills: 0 | Status: SHIPPED
Start: 2017-01-01

## 2017-01-01 RX ORDER — METRONIDAZOLE 500 MG/100ML
500 INJECTION, SOLUTION INTRAVENOUS EVERY 8 HOURS
Status: DISCONTINUED | OUTPATIENT
Start: 2017-01-01 | End: 2017-01-01

## 2017-01-01 RX ORDER — TRAMADOL HYDROCHLORIDE 50 MG/1
50 TABLET ORAL
Status: DISCONTINUED | OUTPATIENT
Start: 2017-01-01 | End: 2017-01-01 | Stop reason: HOSPADM

## 2017-01-01 RX ORDER — ROSUVASTATIN CALCIUM 20 MG/1
40 TABLET, COATED ORAL
Status: DISCONTINUED | OUTPATIENT
Start: 2017-01-01 | End: 2017-01-01 | Stop reason: HOSPADM

## 2017-01-01 RX ORDER — ROSUVASTATIN CALCIUM 40 MG/1
40 TABLET, COATED ORAL
Qty: 90 TAB | Refills: 2 | Status: SHIPPED | OUTPATIENT
Start: 2017-01-01

## 2017-01-01 RX ORDER — FUROSEMIDE 10 MG/ML
40 INJECTION INTRAMUSCULAR; INTRAVENOUS ONCE
Status: COMPLETED | OUTPATIENT
Start: 2017-01-01 | End: 2017-01-01

## 2017-01-01 RX ORDER — FUROSEMIDE 10 MG/ML
40 INJECTION INTRAMUSCULAR; INTRAVENOUS ONCE
Status: ACTIVE | OUTPATIENT
Start: 2017-01-01 | End: 2017-01-01

## 2017-01-01 RX ORDER — METOPROLOL TARTRATE 50 MG/1
50 TABLET ORAL 2 TIMES DAILY
Status: DISCONTINUED | OUTPATIENT
Start: 2017-01-01 | End: 2017-01-01

## 2017-01-01 RX ORDER — METRONIDAZOLE 500 MG/1
500 TABLET ORAL 3 TIMES DAILY
Status: DISCONTINUED | OUTPATIENT
Start: 2017-01-01 | End: 2017-01-01

## 2017-01-01 RX ADMIN — METRONIDAZOLE 500 MG: 500 TABLET ORAL at 21:55

## 2017-01-01 RX ADMIN — METOPROLOL TARTRATE 50 MG: 50 TABLET ORAL at 09:01

## 2017-01-01 RX ADMIN — METRONIDAZOLE 500 MG: 500 TABLET ORAL at 09:00

## 2017-01-01 RX ADMIN — ROSUVASTATIN CALCIUM 40 MG: 20 TABLET ORAL at 21:26

## 2017-01-01 RX ADMIN — FUROSEMIDE 40 MG: 40 TABLET ORAL at 09:16

## 2017-01-01 RX ADMIN — PROPAFENONE HYDROCHLORIDE 225 MG: 225 TABLET, COATED ORAL at 16:53

## 2017-01-01 RX ADMIN — METOPROLOL TARTRATE 50 MG: 50 TABLET ORAL at 17:30

## 2017-01-01 RX ADMIN — METOPROLOL TARTRATE 100 MG: 50 TABLET ORAL at 17:30

## 2017-01-01 RX ADMIN — PROPAFENONE HYDROCHLORIDE 225 MG: 225 TABLET, COATED ORAL at 10:09

## 2017-01-01 RX ADMIN — METRONIDAZOLE 500 MG: 500 TABLET ORAL at 09:50

## 2017-01-01 RX ADMIN — METRONIDAZOLE 500 MG: 500 INJECTION, SOLUTION INTRAVENOUS at 11:00

## 2017-01-01 RX ADMIN — FUROSEMIDE 40 MG: 10 INJECTION, SOLUTION INTRAMUSCULAR; INTRAVENOUS at 17:50

## 2017-01-01 RX ADMIN — PROPAFENONE HYDROCHLORIDE 225 MG: 225 TABLET, COATED ORAL at 17:31

## 2017-01-01 RX ADMIN — METRONIDAZOLE 500 MG: 500 TABLET ORAL at 23:49

## 2017-01-01 RX ADMIN — PROPAFENONE HYDROCHLORIDE 225 MG: 225 TABLET, COATED ORAL at 09:00

## 2017-01-01 RX ADMIN — SPIRONOLACTONE 25 MG: 25 TABLET, FILM COATED ORAL at 18:41

## 2017-01-01 RX ADMIN — VANCOMYCIN HYDROCHLORIDE 125 MG: 1 INJECTION, POWDER, LYOPHILIZED, FOR SOLUTION INTRAVENOUS at 14:43

## 2017-01-01 RX ADMIN — VANCOMYCIN HYDROCHLORIDE 125 MG: 1 INJECTION, POWDER, LYOPHILIZED, FOR SOLUTION INTRAVENOUS at 03:27

## 2017-01-01 RX ADMIN — VANCOMYCIN HYDROCHLORIDE 125 MG: 1 INJECTION, POWDER, LYOPHILIZED, FOR SOLUTION INTRAVENOUS at 09:16

## 2017-01-01 RX ADMIN — PROPAFENONE HYDROCHLORIDE 225 MG: 225 TABLET, COATED ORAL at 21:27

## 2017-01-01 RX ADMIN — SERTRALINE HYDROCHLORIDE 100 MG: 50 TABLET ORAL at 09:01

## 2017-01-01 RX ADMIN — PROPAFENONE HYDROCHLORIDE 225 MG: 225 TABLET, COATED ORAL at 21:03

## 2017-01-01 RX ADMIN — VANCOMYCIN HYDROCHLORIDE 125 MG: 1 INJECTION, POWDER, LYOPHILIZED, FOR SOLUTION INTRAVENOUS at 21:28

## 2017-01-01 RX ADMIN — ROSUVASTATIN CALCIUM 40 MG: 20 TABLET ORAL at 21:27

## 2017-01-01 RX ADMIN — METOPROLOL TARTRATE 100 MG: 50 TABLET ORAL at 08:37

## 2017-01-01 RX ADMIN — VANCOMYCIN HYDROCHLORIDE 125 MG: 1 INJECTION, POWDER, LYOPHILIZED, FOR SOLUTION INTRAVENOUS at 21:00

## 2017-01-01 RX ADMIN — LORATADINE 10 MG: 10 TABLET ORAL at 09:10

## 2017-01-01 RX ADMIN — SERTRALINE HYDROCHLORIDE 100 MG: 50 TABLET ORAL at 08:33

## 2017-01-01 RX ADMIN — ROSUVASTATIN CALCIUM 40 MG: 20 TABLET ORAL at 21:13

## 2017-01-01 RX ADMIN — SERTRALINE HYDROCHLORIDE 100 MG: 50 TABLET ORAL at 10:09

## 2017-01-01 RX ADMIN — METOPROLOL TARTRATE 100 MG: 50 TABLET ORAL at 09:15

## 2017-01-01 RX ADMIN — PROPAFENONE HYDROCHLORIDE 225 MG: 225 TABLET, COATED ORAL at 21:13

## 2017-01-01 RX ADMIN — VANCOMYCIN HYDROCHLORIDE 125 MG: 1 INJECTION, POWDER, LYOPHILIZED, FOR SOLUTION INTRAVENOUS at 21:13

## 2017-01-01 RX ADMIN — SODIUM CHLORIDE 50 ML/HR: 900 INJECTION, SOLUTION INTRAVENOUS at 13:00

## 2017-01-01 RX ADMIN — ACETAMINOPHEN 650 MG: 325 TABLET ORAL at 16:22

## 2017-01-01 RX ADMIN — METOPROLOL TARTRATE 50 MG: 50 TABLET ORAL at 09:50

## 2017-01-01 RX ADMIN — PROPAFENONE HYDROCHLORIDE 225 MG: 225 TABLET, COATED ORAL at 17:41

## 2017-01-01 RX ADMIN — PROPAFENONE HYDROCHLORIDE 225 MG: 225 TABLET, COATED ORAL at 23:37

## 2017-01-01 RX ADMIN — FUROSEMIDE 40 MG: 10 INJECTION, SOLUTION INTRAMUSCULAR; INTRAVENOUS at 18:40

## 2017-01-01 RX ADMIN — PROPAFENONE HYDROCHLORIDE 225 MG: 225 TABLET, COATED ORAL at 09:51

## 2017-01-01 RX ADMIN — METOPROLOL TARTRATE 100 MG: 50 TABLET ORAL at 10:09

## 2017-01-01 RX ADMIN — VANCOMYCIN HYDROCHLORIDE 125 MG: 1 INJECTION, POWDER, LYOPHILIZED, FOR SOLUTION INTRAVENOUS at 02:16

## 2017-01-01 RX ADMIN — METRONIDAZOLE 500 MG: 500 TABLET ORAL at 15:49

## 2017-01-01 RX ADMIN — ROSUVASTATIN CALCIUM 40 MG: 20 TABLET ORAL at 21:57

## 2017-01-01 RX ADMIN — METOPROLOL TARTRATE 100 MG: 50 TABLET ORAL at 08:31

## 2017-01-01 RX ADMIN — METRONIDAZOLE 500 MG: 500 TABLET ORAL at 16:22

## 2017-01-01 RX ADMIN — VANCOMYCIN HYDROCHLORIDE 125 MG: 1 INJECTION, POWDER, LYOPHILIZED, FOR SOLUTION INTRAVENOUS at 08:32

## 2017-01-01 RX ADMIN — METRONIDAZOLE 500 MG: 500 TABLET ORAL at 22:04

## 2017-01-01 RX ADMIN — ACETAMINOPHEN 1000 MG: 500 TABLET ORAL at 21:26

## 2017-01-01 RX ADMIN — FUROSEMIDE 40 MG: 40 TABLET ORAL at 08:31

## 2017-01-01 RX ADMIN — METRONIDAZOLE 500 MG: 500 TABLET ORAL at 09:10

## 2017-01-01 RX ADMIN — CIPROFLOXACIN 400 MG: 2 INJECTION, SOLUTION INTRAVENOUS at 22:04

## 2017-01-01 RX ADMIN — LORATADINE 10 MG: 10 TABLET ORAL at 09:01

## 2017-01-01 RX ADMIN — SODIUM CHLORIDE 50 ML/HR: 900 INJECTION, SOLUTION INTRAVENOUS at 02:53

## 2017-01-01 RX ADMIN — METOPROLOL TARTRATE 50 MG: 50 TABLET ORAL at 17:40

## 2017-01-01 RX ADMIN — METRONIDAZOLE 500 MG: 500 TABLET ORAL at 16:34

## 2017-01-01 RX ADMIN — FUROSEMIDE 40 MG: 10 INJECTION, SOLUTION INTRAMUSCULAR; INTRAVENOUS at 10:09

## 2017-01-01 RX ADMIN — SERTRALINE HYDROCHLORIDE 100 MG: 50 TABLET ORAL at 08:31

## 2017-01-01 RX ADMIN — VANCOMYCIN HYDROCHLORIDE 125 MG: 1 INJECTION, POWDER, LYOPHILIZED, FOR SOLUTION INTRAVENOUS at 15:28

## 2017-01-01 RX ADMIN — VANCOMYCIN HYDROCHLORIDE 125 MG: 1 INJECTION, POWDER, LYOPHILIZED, FOR SOLUTION INTRAVENOUS at 08:39

## 2017-01-01 RX ADMIN — ROSUVASTATIN CALCIUM 40 MG: 20 TABLET ORAL at 22:03

## 2017-01-01 RX ADMIN — VANCOMYCIN HYDROCHLORIDE 125 MG: 1 INJECTION, POWDER, LYOPHILIZED, FOR SOLUTION INTRAVENOUS at 14:56

## 2017-01-01 RX ADMIN — PROPAFENONE HYDROCHLORIDE 225 MG: 225 TABLET, COATED ORAL at 16:34

## 2017-01-01 RX ADMIN — SERTRALINE HYDROCHLORIDE 100 MG: 50 TABLET ORAL at 09:50

## 2017-01-01 RX ADMIN — METRONIDAZOLE 500 MG: 500 TABLET ORAL at 21:26

## 2017-01-01 RX ADMIN — SERTRALINE HYDROCHLORIDE 100 MG: 50 TABLET ORAL at 09:10

## 2017-01-01 RX ADMIN — LORATADINE 10 MG: 10 TABLET ORAL at 08:32

## 2017-01-01 RX ADMIN — METOPROLOL TARTRATE 50 MG: 50 TABLET ORAL at 09:10

## 2017-01-01 RX ADMIN — PROPAFENONE HYDROCHLORIDE 225 MG: 225 TABLET, COATED ORAL at 08:31

## 2017-01-01 RX ADMIN — PROPAFENONE HYDROCHLORIDE 225 MG: 225 TABLET, COATED ORAL at 15:49

## 2017-01-01 RX ADMIN — SPIRONOLACTONE 25 MG: 25 TABLET, FILM COATED ORAL at 08:30

## 2017-01-01 RX ADMIN — CIPROFLOXACIN 200 MG: 2 INJECTION, SOLUTION INTRAVENOUS at 04:03

## 2017-01-01 RX ADMIN — METRONIDAZOLE 500 MG: 500 TABLET ORAL at 17:30

## 2017-01-01 RX ADMIN — FUROSEMIDE 40 MG: 10 INJECTION, SOLUTION INTRAMUSCULAR; INTRAVENOUS at 21:55

## 2017-01-01 RX ADMIN — PROPAFENONE HYDROCHLORIDE 225 MG: 225 TABLET, COATED ORAL at 21:26

## 2017-01-01 RX ADMIN — METOPROLOL TARTRATE 100 MG: 50 TABLET ORAL at 18:41

## 2017-01-01 RX ADMIN — SERTRALINE HYDROCHLORIDE 100 MG: 50 TABLET ORAL at 08:38

## 2017-01-01 RX ADMIN — PROPAFENONE HYDROCHLORIDE 225 MG: 225 TABLET, COATED ORAL at 08:37

## 2017-01-01 RX ADMIN — METRONIDAZOLE 500 MG: 500 INJECTION, SOLUTION INTRAVENOUS at 02:53

## 2017-01-01 RX ADMIN — VANCOMYCIN HYDROCHLORIDE 1500 MG: 10 INJECTION, POWDER, LYOPHILIZED, FOR SOLUTION INTRAVENOUS at 05:24

## 2017-01-01 RX ADMIN — SERTRALINE HYDROCHLORIDE 100 MG: 50 TABLET ORAL at 09:16

## 2017-01-01 RX ADMIN — SODIUM CHLORIDE 1000 ML: 900 INJECTION, SOLUTION INTRAVENOUS at 21:28

## 2017-01-01 RX ADMIN — ROSUVASTATIN CALCIUM 40 MG: 20 TABLET ORAL at 21:03

## 2017-01-01 RX ADMIN — POTASSIUM CHLORIDE 20 MEQ: 20 TABLET, EXTENDED RELEASE ORAL at 11:21

## 2017-01-01 RX ADMIN — VANCOMYCIN HYDROCHLORIDE 1250 MG: 10 INJECTION, POWDER, LYOPHILIZED, FOR SOLUTION INTRAVENOUS at 13:57

## 2017-01-01 RX ADMIN — LORATADINE 10 MG: 10 TABLET ORAL at 09:16

## 2017-01-01 RX ADMIN — PROPAFENONE HYDROCHLORIDE 225 MG: 225 TABLET, COATED ORAL at 23:50

## 2017-01-01 RX ADMIN — LORATADINE 10 MG: 10 TABLET ORAL at 10:09

## 2017-01-01 RX ADMIN — LORATADINE 10 MG: 10 TABLET ORAL at 08:38

## 2017-01-01 RX ADMIN — SPIRONOLACTONE 25 MG: 25 TABLET, FILM COATED ORAL at 08:38

## 2017-01-01 RX ADMIN — METOPROLOL TARTRATE 100 MG: 50 TABLET ORAL at 21:37

## 2017-01-01 RX ADMIN — METRONIDAZOLE 500 MG: 500 TABLET ORAL at 10:09

## 2017-01-01 RX ADMIN — LORATADINE 10 MG: 10 TABLET ORAL at 09:51

## 2017-01-01 RX ADMIN — PROPAFENONE HYDROCHLORIDE 225 MG: 225 TABLET, COATED ORAL at 09:16

## 2017-01-01 RX ADMIN — PROPAFENONE HYDROCHLORIDE 225 MG: 225 TABLET, COATED ORAL at 17:30

## 2017-01-01 RX ADMIN — SPIRONOLACTONE 25 MG: 25 TABLET, FILM COATED ORAL at 09:16

## 2017-01-01 RX ADMIN — ROSUVASTATIN CALCIUM 40 MG: 20 TABLET ORAL at 23:49

## 2017-01-01 RX ADMIN — ACETAMINOPHEN 650 MG: 325 TABLET ORAL at 21:26

## 2017-01-01 RX ADMIN — TRAMADOL HYDROCHLORIDE 50 MG: 50 TABLET, FILM COATED ORAL at 12:45

## 2017-01-01 RX ADMIN — PROPAFENONE HYDROCHLORIDE 225 MG: 225 TABLET, COATED ORAL at 09:22

## 2017-01-01 RX ADMIN — PROPAFENONE HYDROCHLORIDE 225 MG: 225 TABLET, COATED ORAL at 21:55

## 2017-01-01 RX ADMIN — METRONIDAZOLE 500 MG: 500 TABLET ORAL at 17:40

## 2017-01-01 RX ADMIN — FUROSEMIDE 40 MG: 10 INJECTION, SOLUTION INTRAMUSCULAR; INTRAVENOUS at 08:38

## 2017-01-01 RX ADMIN — VANCOMYCIN HYDROCHLORIDE 125 MG: 1 INJECTION, POWDER, LYOPHILIZED, FOR SOLUTION INTRAVENOUS at 02:05

## 2017-01-01 RX ADMIN — METOPROLOL TARTRATE 100 MG: 50 TABLET ORAL at 17:06

## 2017-01-01 RX ADMIN — METOPROLOL TARTRATE 50 MG: 50 TABLET ORAL at 17:44

## 2017-02-03 NOTE — PATIENT INSTRUCTIONS
There are no discontinued medications. Orders Placed This Encounter    AMB POC EKG ROUTINE W/ 12 LEADS, INTER & REP     Order Specific Question:   Reason for Exam:     Answer:   a fib          Aortic Valve Stenosis: Care Instructions  Your Care Instructions    Having aortic valve stenosis means that the valve between your heart and the large blood vessel that carries blood to the body (aorta) has narrowed. That forces the heart to pump harder to get enough blood through the valve. As stenosis gets worse, the valve gets narrower. This can cause symptoms. Symptoms include chest pain, dizziness, fainting, or shortness of breath. Surgery can fix the valve. The most common surgery is to replace the aortic valve. Your doctor may want to delay valve replacement until you have severe narrowing. You may take medicine to prevent or treat problems caused by aortic valve stenosis. Follow-up care is a key part of your treatment and safety. Be sure to make and go to all appointments, and call your doctor if you are having problems. It's also a good idea to know your test results and keep a list of the medicines you take. How can you care for yourself at home? · Be safe with medicines. Take your medicines exactly as prescribed. Call your doctor if you think you are having a problem with your medicine. You will get more details on the specific medicines your doctor prescribes. · Plan your meals so that you are eating heart-healthy foods. ¨ Eat a variety of foods daily. Fresh fruits and vegetables and whole grains are good choices. ¨ Limit your fat intake, especially saturated and trans fat. ¨ Limit salt (sodium). ¨ Increase fiber in your diet. ¨ Limit alcohol. · Be active. Ask your doctor what type and level of exercise is safe for you. Walking is a good choice. Your doctor may suggest that you join a cardiac rehabilitation program so that you can have help increasing your physical activity safely. · Do not smoke. Smoking can make aortic valve stenosis worse. If you need help quitting, talk to your doctor about stop-smoking programs and medicines. These can increase your chances of quitting for good. · Stay at a healthy weight. Lose weight if you need to. · Avoid colds and flu. Get a pneumococcal vaccine shot. If you have had one before, ask your doctor if you need another dose. Get a flu vaccine every year. · Take care of your teeth and gums. Get regular dental checkups. Good dental health is important because bacteria can spread from infected teeth and gums to the heart valves. When should you call for help? Call 911 anytime you think you may need emergency care. For example, call if:  · You passed out (lost consciousness). · You have symptoms of a heart attack. These may include:  ¨ Chest pain or pressure, or a strange feeling in the chest.  ¨ Sweating. ¨ Shortness of breath. ¨ Nausea or vomiting. ¨ Pain, pressure, or a strange feeling in the back, neck, jaw, or upper belly or in one or both shoulders or arms. ¨ Lightheadedness or sudden weakness. ¨ A fast or irregular heartbeat. After you call 911, the  may tell you to chew 1 adult-strength or 2 to 4 low-dose aspirin. Wait for an ambulance. Do not try to drive yourself. Call your doctor now or seek immediate medical care if:  · You develop new symptoms of aortic valve stenosis, such as chest pain, dizziness, or shortness of breath. · You have new or increased shortness of breath. · You are dizzy or lightheaded, or you feel like you may faint. · You have sudden weight gain, such as 3 pounds or more in 2 to 3 days. · You have increased swelling in your legs, ankles, or feet. Watch closely for changes in your health, and be sure to contact your doctor if:  · You have trouble making healthy lifestyle changes. · You want more information about healthy lifestyle changes. Where can you learn more?   Go to http://cezar-pamela.info/. Enter D111 in the search box to learn more about \"Aortic Valve Stenosis: Care Instructions. \"  Current as of: January 27, 2016  Content Version: 11.1  © 9947-7955 [employer]. Care instructions adapted under license by [employer] (which disclaims liability or warranty for this information). If you have questions about a medical condition or this instruction, always ask your healthcare professional. [place] [address] any warranty or liability for your use of this information.

## 2017-02-03 NOTE — PROGRESS NOTES
1. Have you been to the ER, urgent care clinic since your last visit? Hospitalized since your last visit? No    2. Have you seen or consulted any other health care providers outside of the [address] [place] [address] since your last visit? Include any pap smears or colon screening. No     3. Since your last visit, have you had any of the following symptoms? None    4. Have you had any blood work, X-rays or cardiac testing? Yes in [facility]    5. Where do you normally have your labs drawn? [facility]     6. Do you need any refills today?  no       Medications confirmed by patient's medication list

## 2017-02-03 NOTE — PROGRESS NOTES
HISTORY OF PRESENT ILLNESS  Toi Mariee is a 73 y.o. female. HPI Comments: 11/16 feels better  8/16 wants to discuss possible AVR  8/16 c/o dysphagia- solids/liquids f/u Dr Milana Renae  2/16 no more falls  11/15 [facility] on concrete twice as she tripped, no dizziness/LOC- found with rib and pelvic fractures- not admitted      CHF   The history is provided by the medical records. This is a chronic problem. Associated symptoms include shortness of breath. Pertinent negatives include no chest pain and no headaches. Palpitations    The history is provided by the medical records (h/o AFib). This is a recurrent problem. The problem occurs rarely (2/month). Episode Length: once seemed to last a few hours. Associated symptoms include lower extremity edema and shortness of breath. Pertinent negatives include no fever, no malaise/fatigue, no chest pain, no claudication, no orthopnea, no PND, no nausea, no vomiting, no headaches, no dizziness and no cough. Her past medical history is significant for hypertension. Valvular Heart Disease   The history is provided by the medical records (MS/MR/AS). This is a chronic problem. Associated symptoms include shortness of breath. Pertinent negatives include no chest pain and no headaches. Hypertension   The history is provided by the medical records. This is a chronic problem. Associated symptoms include shortness of breath. Pertinent negatives include no chest pain and no headaches. Cholesterol Problem   The history is provided by the medical records. This is a chronic problem. Associated symptoms include shortness of breath. Pertinent negatives include no chest pain and no headaches. Shortness of Breath   The history is provided by the patient. This is a new (more of tiredness) problem. The problem occurs intermittently. The current episode started more than 1 week ago. The problem has been rapidly improving. Associated symptoms include leg swelling.  Pertinent negatives include no fever, no headaches, no cough, no wheezing, no PND, no orthopnea, no chest pain, no vomiting, no rash and no claudication. The problem's precipitants include exercise (walking in grocery store, cleaning; 10/15 shopping; 2/16 much better; 2/17 1.5 miles). Leg Swelling   The history is provided by the patient. This is a new problem. The current episode started more than 2 days ago. The problem occurs every several days. The problem has been rapidly improving. Associated symptoms include shortness of breath. Pertinent negatives include no chest pain and no headaches. The symptoms are aggravated by standing. The symptoms are relieved by sleep. Review of Systems   Constitutional: Negative for chills, fever, malaise/fatigue and weight loss. HENT: Negative for nosebleeds. Eyes: Negative for discharge. Respiratory: Positive for shortness of breath. Negative for cough and wheezing. Cardiovascular: Positive for palpitations and leg swelling. Negative for chest pain, orthopnea, claudication and PND. Gastrointestinal: Negative for diarrhea, nausea and vomiting. Genitourinary: Negative for dysuria and hematuria. Musculoskeletal: Negative for joint pain. Skin: Negative for rash. Neurological: Negative for dizziness, seizures, loss of consciousness and headaches. Endo/Heme/Allergies: Negative for polydipsia. Does not bruise/bleed easily. Psychiatric/Behavioral: Negative for depression and substance abuse. The patient does not have insomnia.       Allergies   Allergen Reactions    Latex Itching    Adhesive Other (comments)     Blisters skin    Amoxicillin Hives    Bactrim [Sulfamethoprim] Nausea Only       Past Medical History   Diagnosis Date    A-fib [facility])     Aortic valve disorders 8/31/2015     7/15 mild AS     Arrhythmia      a-fib     Arthritis     Autoimmune disease (HCC)      lupus 2001  Reynauds disease  Sjogrens    CAD (coronary artery disease)      AFIB  2011 Dr Sky Hua Chest pain, unspecified      Poss atyp angina, will Rx medically now and consider cath in future if needed    Chronic diastolic heart failure (HCC)      Stable symptoms    Chronic kidney disease     Chronic kidney disease, unspecified     Chronic venous embolism and thrombosis of unspecified deep vessels of lower extremity      8/11    Congestive heart failure, unspecified     Edema      improving    Essential hypertension     Hyperlipidemia     Hypertension     Lupus ([address]     Mitral regurgitation and mitral stenosis 10/29/2015     7/15 mild     Mitral valve disorders 8/31/2015     7/15 mild MS/MR     Pancytopenia (HCC)     Paroxysmal atrial fibrillation (HCC)     Rheumatoid arthritis(714.0)        Family History   Problem Relation Age of Onset    Colon Polyps Brother     Heart Disease Neg Hx      Negative history of premature CAD or CVA    Heart Attack Neg Hx     Sudden Death Neg Hx     Stroke Neg Hx        Social History   Substance Use Topics    Smoking status: Never Smoker    Smokeless tobacco: Never Used    Alcohol use No      Comment: ocassional        Current Outpatient Prescriptions   Medication Sig    propafenone (RYTHMOL) 225 mg tablet Take 1 Tab by mouth three (3) times daily.  triamcinolone acetonide (KENALOG) 0.1 % ointment Apply  to affected area two (2) times a day. use thin layer    rosuvastatin (CRESTOR) 40 mg tablet Take 1 Tab by mouth nightly.  furosemide (LASIX) 40 mg tablet Take 1 Tab by mouth three (3) times daily. Take 2 tabs(80mg) in am and 1 tab 40 mg  at 2pm. May skip PM lasix if no edema/SOB    hydrALAZINE (APRESOLINE) 10 mg tablet TAKE 1 TABLET THREE TIMES A DAY    metolazone (ZAROXOLYN) 2.5 mg tablet Take 2.5 mg by mouth as needed.  acetaminophen (TYLENOL) 325 mg tablet Take 650 mg by mouth every four (4) hours as needed for Pain.  clotrimazole-betamethasone (LOTRISONE) topical cream Apply  to affected area two (2) times a day.     amLODIPine (NORVASC) 5 mg tablet Take 1 Tab by mouth daily.  Cholecalciferol, Vitamin D3, 1,000 unit cap Take 2,000 Units by mouth daily.  loperamide (IMODIUM) 2 mg capsule Take  by mouth as needed.  traMADol (ULTRAM) 50 mg tablet Take 50 mg by mouth every six (6) hours as needed for Pain.  Fesoterodine (TOVIAZ) 4 mg SR tablet Take 8 mg by mouth daily.  metoprolol (LOPRESSOR) 50 mg tablet Take 50 mg by mouth two (2) times a day.  gabapentin (NEURONTIN) 100 mg capsule Take 200 mg by mouth nightly.  sertraline (ZOLOFT) 50 mg tablet Take 100 mg by mouth daily.  isosorbide mononitrate ER (IMDUR) 60 mg CR tablet Take 60 mg by mouth every morning.  azaTHIOprine (IMURAN) 50 mg tablet Take 50 mg by mouth two (2) times a day.  warfarin (COUMADIN) 1 mg tablet Take 1 Tab by mouth daily.  predniSONE (DELTASONE) 5 mg tablet Take 5 mg by mouth two (2) times a day.  famotidine (PEPCID) 40 mg tablet Take 1 Tab by mouth two (2) times a day. (Patient taking differently: Take 40 mg by mouth daily. 2 am 1 pm)     No current facility-administered medications for this visit.          Past Surgical History   Procedure Laterality Date    Pr abdomen surgery proc unlisted       gall bladder    Hx gyn       hyst    Pr breast surgery procedure unlisted       reduction    Hx cholecystectomy      Endoscopy, colon, diagnostic      Hx other surgical       lymph nodes removed and eyelids raised    Full esophageal manometry  7/4/2016             Diagnostic Studies:  CARDIOLOGY STUDIES 7/5/2015 7/2/2015 4/14/2014 4/11/2014 9/1/2012 8/1/2012 9/1/2011   EKG Result - - - - - SR, IRBBB -   Myocardial Perfusion Scan Result - - - - inf/sept hypo, nl perfusion, nl EF (nl per pt in last 6 months (6-12/110 - -   Pharmacological Nuclear Stress Test Result - - lat ischemia, 63%EF - - - -   Echocardiogram - Complete Result - 60%EF, mild MR/MS, mild AS(mean grad 13), PAP57 - 65%EF, mild DD, mild MR, heavy MAC, PAP58 60% EF, mild MR, PAP 40 - 55% EF (per office note)   Doppler US Vascular Result neg DVT - - - - - -   Doppler US Arterial Result neg ANUPAM - - - - - -   7/16 ECHO  SUMMARY:  Left ventricle: Systolic function was normal. Ejection fraction was  estimated to be 70 %. There were no regional wall motion abnormalities. There was mild concentric hypertrophy. Septal prominence noted (Sigmoid  appearance seen in certain views). Doppler parameters were consistent with  a reversible restrictive pattern, indicative of decreased left ventricular  diastolic compliance and/or increased left atrial pressure (grade 3  diastolic dysfunction). Right ventricle: The ventricle was mildly dilated with preserved systolic  function. Left atrium: The atrium was severely dilated. LA volume index was 60  ml/mï¾². Mitral valve: There was moderate to marked annular calcification. There  was moderately reduced leaflet separation. Transmitral velocity was  increased due to valvular stenosis. The findings were consistent with  moderate mitral stenosis estimated by valve mean pressure gradient and  visual estimate. There was mild regurgitation. Mean transmitral gradient  was 6 mmHg. Mitral valve area by pressure half-time was 2.2 cmï¾². Aortic valve: The valve was trileaflet. Leaflets exhibited moderately  increased thickness and normal cuspal separation. Transaortic velocity was  increased due to valvular stenosis. There was moderate stenosis. Systolic  area of 1 cmï¾² by planimetry. The peak valve velocity was 353 cm/s. Valve  mean gradient was 27 mmHg. Tricuspid valve: There was mild regurgitation. Pulmonary artery systolic  pressure: 45 mmHg. Summary Measurements  CW measurements:  Unspecified Anatomy:   AV Vmax was 3.53 m/s.  AV maxPG was 49.78 mmHg.  AV  meanPG was 27.4 mmHg.  MV meanPG was 5.95 mmHg.  MVA By PHT was 1.32 cm2. PW measurements:  Unspecified Anatomy:   MVA By PHT was 2.52 cm2.     COMPARISONS:  Comparison was made with the previous study of 05-Jul-2015. Aortic  stenosis has worsened, valve mean pressure gradient was estimated at 13  mmHg on previous exam. Mitral stenosis has worsened. Left atrium has  increased in size. Right ventricle size has increased. Pulmonary artery  pressure has decreased from 57 mmHg    Visit Vitals    /65 (BP 1 Location: Left arm, BP Patient Position: At rest)    Pulse 73    Ht 5' 6\" (1.676 m)    Wt 70.8 kg (156 lb)    BMI 25.18 kg/m2       Ms. Persaud has a reminder for a \"due or due soon\" health maintenance. I have asked that she contact her primary care provider for follow-up on this health maintenance. Physical Exam   Constitutional: She is oriented to person, place, and time. She appears well-developed and well-nourished. No distress. HENT:   Head: Normocephalic and atraumatic. Mouth/Throat: Normal dentition. Eyes: Right eye exhibits no discharge. Left eye exhibits no discharge. No scleral icterus. Neck: Neck supple. No JVD present. Carotid bruit is not present. No thyromegaly present. Cardiovascular: Normal rate, regular rhythm, S1 normal, S2 normal and intact distal pulses. Exam reveals no gallop and no friction rub. Murmur heard. High-pitched harsh midsystolic murmur is present with a grade of 3/6  at the apex radiating to the neck  Pulmonary/Chest: Effort normal. She has no wheezes. She has rales (few basal chronic). Abdominal: Soft. She exhibits no mass. There is no tenderness. Musculoskeletal: She exhibits edema (trace R>L). Lymphadenopathy:        Right cervical: No superficial cervical adenopathy present. Left cervical: No superficial cervical adenopathy present. Neurological: She is alert and oriented to person, place, and time. Skin: Skin is warm and dry. No rash noted. Psychiatric: She has a normal mood and affect.  Her behavior is normal.       Jozefmina Meza was seen today for chf, irregular heart beat, coronary artery disease, valvular heart disease, hypertension and cholesterol problem. Diagnoses and all orders for this visit:    Paroxysmal atrial fibrillation ([facility]  Comments:  2/17 occ palp but SR on EKG; 11/16 SR on rythmol  Orders:  -     AMB POC EKG ROUTINE W/ 12 LEADS, INTER & REP    Chronic diastolic heart failure ([facility]  Comments:  2/17; 11/16; 8/16 [id number];     Atherosclerosis of native coronary artery of native heart without angina pectoris  Comments:  4/14 abnormal stress test with lat ischemia; med Rx      Essential hypertension, benign  Comments:  2/17; 11/16; 8/16 controlled      Nonrheumatic aortic valve stenosis  Comments:  2/17; 11/16 stable symptoms; Nonrheumatic mitral valve stenosis with insufficiency  Comments:  7/16 mild MR, mild/mod MS(mean grad 6); 7/15 mild      Pulmonary HTN ([facility]  Comments:  7/16 PAP45; 7/15 PAP 57      Mixed hyperlipidemia  Comments:  12/16 QLV36, ; 9/16 IFB86, ; 3/16 FUH257, ;       Lupus ([facility]        Pertinent laboratory and test data reviewed and discussed with patient. See patient instructions also for other medical advice given    There are no discontinued medications. Follow-up Disposition:  Return in about 6 months (around 8/3/2017), or if symptoms worsen or fail to improve.

## 2017-02-03 NOTE — PROGRESS NOTES
HISTORY OF PRESENT ILLNESS  La Nena Garza is a 73 y.o. female. HPI    ROS    Physical Exam    ASSESSMENT and PLAN  {ASSESSMENT/PLAN:18099}   HISTORY OF PRESENT ILLNESS  La Nena Garza is a 73 y.o. female. HPI Comments: 11/16 feels better  8/16 wants to discuss possible AVR  8/16 c/o dysphagia- solids/liquids f/u Dr Nalini Lin  2/16 no more falls  11/15 Vee Advent on concrete twice as she tripped, no dizziness/LOC- found with rib and pelvic fractures- not admitted      Valvular Heart Disease   The history is provided by the medical records (MS/MR/AS). This is a chronic problem. Associated symptoms include shortness of breath. Pertinent negatives include no chest pain and no headaches. Palpitations    The history is provided by the medical records (h/o AFib). Associated symptoms include lower extremity edema and shortness of breath. Pertinent negatives include no fever, no malaise/fatigue, no chest pain, no claudication, no orthopnea, no PND, no nausea, no vomiting, no headaches, no dizziness and no cough. Her past medical history is significant for hypertension. Hypertension   The history is provided by the medical records. This is a chronic problem. Associated symptoms include shortness of breath. Pertinent negatives include no chest pain and no headaches. Cholesterol Problem   The history is provided by the medical records. This is a chronic problem. Associated symptoms include shortness of breath. Pertinent negatives include no chest pain and no headaches. CHF   The history is provided by the medical records. This is a chronic problem. Associated symptoms include shortness of breath. Pertinent negatives include no chest pain and no headaches. Shortness of Breath   The history is provided by the patient. This is a new (more of tiredness) problem. The problem occurs intermittently. The current episode started more than 1 week ago. The problem has not changed since onset. Associated symptoms include leg swelling. Pertinent negatives include no fever, no headaches, no cough, no wheezing, no PND, no orthopnea, no chest pain, no vomiting, no rash and no claudication. The problem's precipitants include exercise (walking in grocery store, cleaning; 10/15 shopping; 2/16 much better). Leg Swelling   The history is provided by the patient. This is a new problem. The current episode started more than 2 days ago. The problem occurs every several days. The problem has been gradually improving. Associated symptoms include shortness of breath. Pertinent negatives include no chest pain and no headaches. The symptoms are aggravated by standing. The symptoms are relieved by sleep. Review of Systems   Constitutional: Negative for chills, fever, malaise/fatigue and weight loss. HENT: Negative for nosebleeds. Eyes: Negative for discharge. Respiratory: Positive for shortness of breath. Negative for cough and wheezing. Cardiovascular: Positive for palpitations (Paf) and leg swelling. Negative for chest pain, orthopnea, claudication and PND. Gastrointestinal: Negative for diarrhea, nausea and vomiting. Genitourinary: Negative for dysuria and hematuria. Musculoskeletal: Negative for joint pain. Skin: Negative for rash. Neurological: Negative for dizziness, seizures, loss of consciousness and headaches. Endo/Heme/Allergies: Negative for polydipsia. Does not bruise/bleed easily. Psychiatric/Behavioral: Negative for depression and substance abuse. The patient does not have insomnia.       Allergies   Allergen Reactions    Latex Itching    Adhesive Other (comments)     Blisters skin    Amoxicillin Hives    Bactrim [Sulfamethoprim] Nausea Only       Past Medical History   Diagnosis Date    A-fib [facility])     Aortic valve disorders 8/31/2015     7/15 mild AS     Arrhythmia      a-fib     Arthritis     Autoimmune disease (HCC)      lupus 2001  Reynauds disease  Sjogrens    CAD (coronary artery disease)      AFIB 2011 Dr Prakash Life Chest pain, unspecified      Poss atyp angina, will Rx medically now and consider cath in future if needed    Chronic diastolic heart failure (HCC)      Stable symptoms    Chronic kidney disease     Chronic kidney disease, unspecified     Chronic venous embolism and thrombosis of unspecified deep vessels of lower extremity      8/11    Congestive heart failure, unspecified     Edema      improving    Essential hypertension     Hyperlipidemia     Hypertension     Lupus ([facility] [address]     Mitral regurgitation and mitral stenosis 10/29/2015     7/15 mild     Mitral valve disorders 8/31/2015     7/15 mild MS/MR     Pancytopenia (HCC)     Paroxysmal atrial fibrillation (HCC)     Rheumatoid arthritis(714.0)        Family History   Problem Relation Age of Onset    Colon Polyps Brother     Heart Disease Neg Hx      Negative history of premature CAD or CVA    Heart Attack Neg Hx     Sudden Death Neg Hx     Stroke Neg Hx        Social History   Substance Use Topics    Smoking status: Never Smoker    Smokeless tobacco: Never Used    Alcohol use No      Comment: ocassional        Current Outpatient Prescriptions   Medication Sig    propafenone (RYTHMOL) 225 mg tablet Take 1 Tab by mouth three (3) times daily.  triamcinolone acetonide (KENALOG) 0.1 % ointment Apply  to affected area two (2) times a day. use thin layer    rosuvastatin (CRESTOR) 40 mg tablet Take 1 Tab by mouth nightly.  furosemide (LASIX) 40 mg tablet Take 1 Tab by mouth three (3) times daily. Take 2 tabs(80mg) in am and 1 tab 40 mg  at 2pm. May skip PM lasix if no edema/SOB    hydrALAZINE (APRESOLINE) 10 mg tablet TAKE 1 TABLET THREE TIMES A DAY    metolazone (ZAROXOLYN) 2.5 mg tablet Take 2.5 mg by mouth as needed.  acetaminophen (TYLENOL) 325 mg tablet Take 650 mg by mouth every four (4) hours as needed for Pain.  clotrimazole-betamethasone (LOTRISONE) topical cream Apply  to affected area two (2) times a day.  amLODIPine (NORVASC) 5 mg tablet Take 1 Tab by mouth daily.  Cholecalciferol, Vitamin D3, 1,000 unit cap Take 2,000 Units by mouth daily.  loperamide (IMODIUM) 2 mg capsule Take  by mouth as needed.  traMADol (ULTRAM) 50 mg tablet Take 50 mg by mouth every six (6) hours as needed for Pain.  Fesoterodine (TOVIAZ) 4 mg SR tablet Take 8 mg by mouth daily.  metoprolol (LOPRESSOR) 50 mg tablet Take 50 mg by mouth two (2) times a day.  gabapentin (NEURONTIN) 100 mg capsule Take 200 mg by mouth nightly.  sertraline (ZOLOFT) 50 mg tablet Take 100 mg by mouth daily.  isosorbide mononitrate ER (IMDUR) 60 mg CR tablet Take 60 mg by mouth every morning.  azaTHIOprine (IMURAN) 50 mg tablet Take 50 mg by mouth two (2) times a day.  warfarin (COUMADIN) 1 mg tablet Take 1 Tab by mouth daily.  predniSONE (DELTASONE) 5 mg tablet Take 5 mg by mouth two (2) times a day.  famotidine (PEPCID) 40 mg tablet Take 1 Tab by mouth two (2) times a day. (Patient taking differently: Take 40 mg by mouth daily. 2 am 1 pm)     No current facility-administered medications for this visit.          Past Surgical History   Procedure Laterality Date    Pr abdomen surgery proc unlisted       gall bladder    Hx gyn       hyst    Pr breast surgery procedure unlisted       reduction    Hx cholecystectomy      Endoscopy, colon, diagnostic      Hx other surgical       lymph nodes removed and eyelids raised    Full esophageal manometry  7/4/2016             Diagnostic Studies:  CARDIOLOGY STUDIES 7/5/2015 7/2/2015 4/14/2014 4/11/2014 9/1/2012 8/1/2012 9/1/2011   EKG Result - - - - - SR, IRBBB -   Myocardial Perfusion Scan Result - - - - inf/sept hypo, nl perfusion, nl EF (nl per pt in last 6 months (6-12/110 - -   Pharmacological Nuclear Stress Test Result - - lat ischemia, 63%EF - - - -   Echocardiogram - Complete Result - 60%EF, mild MR/MS, mild AS(mean grad 13), PAP57 - 65%EF, mild DD, mild MR, heavy MAC, PAP58 60% EF, mild MR, PAP 40 - 55% EF (per office note)   Doppler US Vascular Result neg DVT - - - - - -   Doppler US Arterial Result neg ANUPAM - - - - - -   7/16 ECHO  SUMMARY:  Left ventricle: Systolic function was normal. Ejection fraction was  estimated to be 70 %. There were no regional wall motion abnormalities. There was mild concentric hypertrophy. Septal prominence noted (Sigmoid  appearance seen in certain views). Doppler parameters were consistent with  a reversible restrictive pattern, indicative of decreased left ventricular  diastolic compliance and/or increased left atrial pressure (grade 3  diastolic dysfunction). Right ventricle: The ventricle was mildly dilated with preserved systolic  function. Left atrium: The atrium was severely dilated. LA volume index was 60  ml/mï¾². Mitral valve: There was moderate to marked annular calcification. There  was moderately reduced leaflet separation. Transmitral velocity was  increased due to valvular stenosis. The findings were consistent with  moderate mitral stenosis estimated by valve mean pressure gradient and  visual estimate. There was mild regurgitation. Mean transmitral gradient  was 6 mmHg. Mitral valve area by pressure half-time was 2.2 cmï¾². Aortic valve: The valve was trileaflet. Leaflets exhibited moderately  increased thickness and normal cuspal separation. Transaortic velocity was  increased due to valvular stenosis. There was moderate stenosis. Systolic  area of 1 cmï¾² by planimetry. The peak valve velocity was 353 cm/s. Valve  mean gradient was 27 mmHg. Tricuspid valve: There was mild regurgitation. Pulmonary artery systolic  pressure: 45 mmHg. Summary Measurements  CW measurements:  Unspecified Anatomy:   AV Vmax was 3.53 m/s.  AV maxPG was 49.78 mmHg.  AV  meanPG was 27.4 mmHg.  MV meanPG was 5.95 mmHg.  MVA By PHT was 1.32 cm2. PW measurements:  Unspecified Anatomy:   MVA By PHT was 2.52 cm2.     COMPARISONS:  Comparison was made with the previous study of 05-Jul-2015. Aortic  stenosis has worsened, valve mean pressure gradient was estimated at 13  mmHg on previous exam. Mitral stenosis has worsened. Left atrium has  increased in size. Right ventricle size has increased. Pulmonary artery  pressure has decreased from 57 mmHg    Visit Vitals    /65 (BP 1 Location: Left arm, BP Patient Position: At rest)    Pulse 73    Ht 5' 6\" (1.676 m)    Wt 70.8 kg (156 lb)    BMI 25.18 kg/m2       Ms. Persaud has a reminder for a \"due or due soon\" health maintenance. I have asked that she contact her primary care provider for follow-up on this health maintenance. Physical Exam   Constitutional: She is oriented to person, place, and time. She appears well-developed and well-nourished. No distress. HENT:   Head: Normocephalic and atraumatic. Mouth/Throat: Normal dentition. Eyes: Right eye exhibits no discharge. Left eye exhibits no discharge. No scleral icterus. Neck: Neck supple. No JVD present. Carotid bruit is not present. No thyromegaly present. Cardiovascular: Normal rate, regular rhythm, S1 normal, S2 normal and intact distal pulses. Exam reveals no gallop and no friction rub. Murmur heard. High-pitched harsh midsystolic murmur is present with a grade of 3/6  at the apex radiating to the neck  Pulmonary/Chest: Effort normal. She has no wheezes. She has rales (few basal chronic). Abdominal: Soft. She exhibits no mass. There is no tenderness. Musculoskeletal: She exhibits edema (trace R>L). Lymphadenopathy:        Right cervical: No superficial cervical adenopathy present. Left cervical: No superficial cervical adenopathy present. Neurological: She is alert and oriented to person, place, and time. Skin: Skin is warm and dry. No rash noted. Psychiatric: She has a normal mood and affect.  Her behavior is normal.       Kezia Ruizy was seen today for chf, irregular heart beat, coronary artery disease, valvular heart disease, hypertension and cholesterol problem. Diagnoses and all orders for this visit:    Paroxysmal atrial fibrillation ([facility] [address]  -     AMB POC EKG ROUTINE W/ 12 LEADS, INTER & REP        Pertinent laboratory and test data reviewed and discussed with patient. See patient instructions also for other medical advice given    There are no discontinued medications.     Follow-up Disposition: Not on File

## 2017-02-03 NOTE — LETTER
Irish Mendiola 1945 
 
2/3/2017 Dear Padmini Dash MD 
 
I had the pleasure of evaluating  Ms. Persaud in office today. Below are the relevant portions of my assessment and plan of care. ICD-10-CM ICD-9-CM 1. Paroxysmal atrial fibrillation (HCC) I48.0 427.31 AMB POC EKG ROUTINE W/ 12 LEADS, INTER & REP  
 2/17 occ palp but SR on EKG; 11/16 SR on rythmol 2. Chronic diastolic heart failure ([facility] [address] I50.32 428.32   
 2/17; 11/16; 8/16 NYHA2;   
3. Atherosclerosis of native coronary artery of native heart without angina pectoris I25.10 414.01   
 4/14 abnormal stress test with lat ischemia; med Rx 4. Essential hypertension, benign I10 401.1 2/17; 11/16; 8/16 controlled 5. Nonrheumatic aortic valve stenosis I35.0 424.1 2/17; 11/16 stable symptoms;  
6. Nonrheumatic mitral valve stenosis with insufficiency I34.2 394.2 I34.0    
 7/16 mild MR, mild/mod MS(mean grad 6); 7/15 mild 7. Pulmonary HTN ([facility] [address] I27.2 416.8   
 7/16 PAP45; 7/15 PAP 57 
  
8. Mixed hyperlipidemia E78.2 272.2   
 12/16 LDL87, ; 9/16 JIG57, ; 3/16 IWA568, ;  
  
9. Lupus ([facility] [address] M32.9 710.0 Current Outpatient Prescriptions Medication Sig Dispense Refill  propafenone (RYTHMOL) 225 mg tablet Take 1 Tab by mouth three (3) times daily. 270 Tab 3  
 triamcinolone acetonide (KENALOG) 0.1 % ointment Apply  to affected area two (2) times a day. use thin layer  rosuvastatin (CRESTOR) 40 mg tablet Take 1 Tab by mouth nightly. 90 Tab 1  
 furosemide (LASIX) 40 mg tablet Take 1 Tab by mouth three (3) times daily. Take 2 tabs(80mg) in am and 1 tab 40 mg  at 2pm. May skip PM lasix if no edema/SOB 90 Tab 2  
 hydrALAZINE (APRESOLINE) 10 mg tablet TAKE 1 TABLET THREE TIMES A  Tab 2  
 metolazone (ZAROXOLYN) 2.5 mg tablet Take 2.5 mg by mouth as needed.  acetaminophen (TYLENOL) 325 mg tablet Take 650 mg by mouth every four (4) hours as needed for Pain.  clotrimazole-betamethasone (LOTRISONE) topical cream Apply  to affected area two (2) times a day.  amLODIPine (NORVASC) 5 mg tablet Take 1 Tab by mouth daily. 30 Tab 3  Cholecalciferol, Vitamin D3, 1,000 unit cap Take 2,000 Units by mouth daily.  loperamide (IMODIUM) 2 mg capsule Take  by mouth as needed.  traMADol (ULTRAM) 50 mg tablet Take 50 mg by mouth every six (6) hours as needed for Pain.  Fesoterodine (TOVIAZ) 4 mg SR tablet Take 8 mg by mouth daily.  metoprolol (LOPRESSOR) 50 mg tablet Take 50 mg by mouth two (2) times a day.  gabapentin (NEURONTIN) 100 mg capsule Take 200 mg by mouth nightly.  sertraline (ZOLOFT) 50 mg tablet Take 100 mg by mouth daily.  isosorbide mononitrate ER (IMDUR) 60 mg CR tablet Take 60 mg by mouth every morning.  azaTHIOprine (IMURAN) 50 mg tablet Take 50 mg by mouth two (2) times a day.  warfarin (COUMADIN) 1 mg tablet Take 1 Tab by mouth daily. 30 Tab 3  predniSONE (DELTASONE) 5 mg tablet Take 5 mg by mouth two (2) times a day.  famotidine (PEPCID) 40 mg tablet Take 1 Tab by mouth two (2) times a day. (Patient taking differently: Take 40 mg by mouth daily. 2 am 1 pm) 60 Tab 0 Orders Placed This Encounter  AMB POC EKG ROUTINE W/ 12 LEADS, INTER & REP Order Specific Question:   Reason for Exam: Answer:   a fib If you have questions, please do not hesitate to call me. I look forward to following Ms. Persaud along with you. Sincerely, Adonay Virk MD

## 2017-04-27 NOTE — TELEPHONE ENCOUNTER
Letter completed and will have Dr. Mauriec Steel sign tomorrow when he arrives to the arrives     This has been fully explained to the patient, who indicates understanding.

## 2017-04-27 NOTE — LETTER
2017 [address] [facility] [address] [facility] [address] Dear Dr. Nora Rowe Re: Kenji Long : 1945 Ms. Persaud is cleared from a cardiac standpoint with moderate (5-10 %)  risk for pelvic reconstruction surgery scheduled on 5/15/17. If you have any questions or any further assistance is needed please contact our office. Sincerely, Kelvin Camarena MD 
  
cc:  Jay Jaimes.  Beverly Colmenares MD

## 2017-04-27 NOTE — TELEPHONE ENCOUNTER
Cleared from cardiac view with the understanding that patient will have moderate(5-10%) risk for this surgery,

## 2017-04-27 NOTE — TELEPHONE ENCOUNTER
Can you clear her for pelvic reconstruction by dr Krishna Jasso on 5/15 from her last office visit of 2/3/17.  Fax clearance note to [phone]

## 2017-05-02 NOTE — TELEPHONE ENCOUNTER
Pt has been having headaches over her right eye for about 2 weeks and although she has been on the Propafenone for a while, she is wondering if that could be causing this.

## 2017-05-03 NOTE — TELEPHONE ENCOUNTER
Returned phone call to patient   Per patient confirmed she has appointment tomorrow with PCP regarding headaches     This has been fully explained to the patient, who indicates understanding.

## 2017-08-03 PROBLEM — I50.33 ACUTE ON CHRONIC DIASTOLIC CONGESTIVE HEART FAILURE (HCC): Status: ACTIVE | Noted: 2017-01-01

## 2017-08-03 NOTE — LETTER
Collette Profit 1945 
 
8/3/2017 Dear MD Mariah Pretty MD 
 
I had the pleasure of evaluating  Ms. Persaud in office today. Below are the relevant portions of my assessment and plan of care. ICD-10-CM ICD-9-CM 1. Acute on chronic diastolic congestive heart failure (HCC) I50.33 428.33 metOLazone (ZAROXOLYN) 2.5 mg tablet 584.1 METABOLIC PANEL, BASIC MAGNESIUM  
   TSH 3RD GENERATION  
   2D ECHO COMPLETE ADULT (TTE) chk echo; adv to take regular diuresis- take metolazone 2/wk 2. Mixed hyperlipidemia E78.2 272.2   
 12/16 LDL87, ;  
3. Pulmonary HTN ([address] I27.2 416.8   
 7/16 PAP45; 7/15 PAP 57  
4. Atherosclerosis of native coronary artery of native heart without angina pectoris I25.10 414.01   
 4/14 abnormal stress test with lat ischemia; med Rx 5. Paroxysmal atrial fibrillation (HCC) I48.0 427.31   
 2/17 occ palp but SR on EKG; 11/16 SR on rythmol 6. Essential hypertension, benign I10 401.1   
  controlled 7. Nonrheumatic aortic valve stenosis I35.0 424.1 7/16 mean grad 27; 7/15 mild AS(mean grad 13) Current Outpatient Prescriptions Medication Sig Dispense Refill  loratadine (CLARITIN) 10 mg tablet Take 10 mg by mouth.  metOLazone (ZAROXOLYN) 2.5 mg tablet Take 1 Tab by mouth every Monday and Thursday. 30 Tab 1  
 hydrALAZINE (APRESOLINE) 10 mg tablet Take 1 Tab by mouth three (3) times daily. 270 Tab 2  
 rosuvastatin (CRESTOR) 40 mg tablet Take 1 Tab by mouth nightly. 90 Tab 2  propafenone (RYTHMOL) 225 mg tablet Take 1 Tab by mouth three (3) times daily. 270 Tab 3  furosemide (LASIX) 40 mg tablet Take 1 Tab by mouth three (3) times daily. Take 2 tabs(80mg) in am and 1 tab 40 mg  at 2pm. May skip PM lasix if no edema/SOB 90 Tab 2  
 acetaminophen (TYLENOL) 325 mg tablet Take 650 mg by mouth every four (4) hours as needed for Pain.  amLODIPine (NORVASC) 5 mg tablet Take 1 Tab by mouth daily.  (Patient taking differently: Take 10 mg by mouth daily.) 30 Tab 3  Cholecalciferol, Vitamin D3, 1,000 unit cap Take 2,000 Units by mouth daily.  loperamide (IMODIUM) 2 mg capsule Take  by mouth as needed.  traMADol (ULTRAM) 50 mg tablet Take 50 mg by mouth every six (6) hours as needed for Pain.  metoprolol (LOPRESSOR) 50 mg tablet Take 50 mg by mouth two (2) times a day.  gabapentin (NEURONTIN) 100 mg capsule Take 200 mg by mouth nightly.  sertraline (ZOLOFT) 50 mg tablet Take 100 mg by mouth daily.  isosorbide mononitrate ER (IMDUR) 60 mg CR tablet Take 60 mg by mouth every morning.  warfarin (COUMADIN) 1 mg tablet Take 1 Tab by mouth daily. 30 Tab 3  predniSONE (DELTASONE) 5 mg tablet Take 5 mg by mouth two (2) times a day.  famotidine (PEPCID) 40 mg tablet Take 1 Tab by mouth two (2) times a day. (Patient taking differently: Take 40 mg by mouth daily. 2 am 1 pm) 60 Tab 0  
 triamcinolone acetonide (KENALOG) 0.1 % ointment Apply  to affected area two (2) times a day. use thin layer   Indications: pt stopped  clotrimazole-betamethasone (LOTRISONE) topical cream Apply  to affected area two (2) times a day. Indications: pt stopped  Fesoterodine (TOVIAZ) 4 mg SR tablet Take 8 mg by mouth daily. Indications: stopped per Dr Brooklyn Jacobsen  azaTHIOprine (IMURAN) 50 mg tablet Take 50 mg by mouth two (2) times a day. Indications: switched md    
 
 
Orders Placed This Encounter  METABOLIC PANEL, BASIC Standing Status:   Future Standing Expiration Date:   11/3/2017  MAGNESIUM Standing Status:   Future Standing Expiration Date:   11/3/2017  
 TSH 3RD GENERATION Standing Status:   Future Standing Expiration Date:   11/3/2017  2D ECHO COMPLETE ADULT (TTE) Standing Status:   Future Standing Expiration Date:   1/30/2018 Order Specific Question:   Reason for Exam: Answer:   chf, as, mr  
 loratadine (CLARITIN) 10 mg tablet Sig: Take 10 mg by mouth.  metOLazone (ZAROXOLYN) 2.5 mg tablet Sig: Take 1 Tab by mouth every Monday and Thursday. Dispense:  30 Tab Refill:  1 If you have questions, please do not hesitate to call me. I look forward to following Ms. Persaud along with you. Sincerely, Andreia Garcia MD

## 2017-08-03 NOTE — PATIENT INSTRUCTIONS
Medications Discontinued During This Encounter   Medication Reason    metolazone (ZAROXOLYN) 2.5 mg tablet Reorder       Orders Placed This Encounter    METABOLIC PANEL, BASIC     Standing Status:   Future     Standing Expiration Date:   11/3/2017    MAGNESIUM     Standing Status:   Future     Standing Expiration Date:   11/3/2017    TSH 3RD GENERATION     Standing Status:   Future     Standing Expiration Date:   11/3/2017    2D ECHO COMPLETE ADULT (TTE)     Standing Status:   Future     Standing Expiration Date:   1/30/2018     Order Specific Question:   Reason for Exam:     Answer:   chf, as, mr    metOLazone (ZAROXOLYN) 2.5 mg tablet     Sig: Take 1 Tab by mouth every Monday and Thursday. Dispense:  30 Tab     Refill:  1          Aortic Valve Stenosis: Care Instructions  Your Care Instructions    Having aortic valve stenosis means that the valve between your heart and the large blood vessel that carries blood to the body (aorta) has narrowed. That forces the heart to pump harder to get enough blood through the valve. As stenosis gets worse, the valve gets narrower. This can cause symptoms. Symptoms include chest pain, dizziness, fainting, or shortness of breath. Surgery can fix the valve. The most common surgery is to replace the aortic valve. Your doctor may want to delay valve replacement until you have severe narrowing. Follow-up care is a key part of your treatment and safety. Be sure to make and go to all appointments, and call your doctor if you are having problems. It's also a good idea to know your test results and keep a list of the medicines you take. How can you care for yourself at home? · Be safe with medicines. Take your medicines exactly as prescribed. Call your doctor if you think you are having a problem with your medicine. You will get more details on the specific medicines your doctor prescribes. · Plan your meals so that you are eating heart-healthy foods.   ¨ Eat a variety of foods daily. Fresh fruits and vegetables and whole grains are good choices. ¨ Limit your fat intake, especially saturated and trans fat. ¨ Limit salt (sodium). ¨ Increase fiber in your diet. ¨ Limit alcohol. · Be active. Ask your doctor what type and level of exercise is safe for you. Walking is a good choice. Your doctor may suggest that you join a cardiac rehabilitation program so that you can have help increasing your physical activity safely. · Do not smoke. Smoking can make aortic valve stenosis worse. If you need help quitting, talk to your doctor about stop-smoking programs and medicines. These can increase your chances of quitting for good. · Stay at a healthy weight. Lose weight if you need to. · Avoid colds and flu. Get a pneumococcal vaccine shot. If you have had one before, ask your doctor if you need another dose. Get a flu vaccine every year. · Take care of your teeth and gums. Get regular dental checkups. Good dental health is important because bacteria can spread from infected teeth and gums to the heart valves. When should you call for help? Call 911 anytime you think you may need emergency care. For example, call if:  · You passed out (lost consciousness). · You have symptoms of a heart attack. These may include:  ¨ Chest pain or pressure, or a strange feeling in the chest.  ¨ Sweating. ¨ Shortness of breath. ¨ Nausea or vomiting. ¨ Pain, pressure, or a strange feeling in the back, neck, jaw, or upper belly or in one or both shoulders or arms. ¨ Lightheadedness or sudden weakness. ¨ A fast or irregular heartbeat. After you call 911, the  may tell you to chew 1 adult-strength or 2 to 4 low-dose aspirin. Wait for an ambulance. Do not try to drive yourself. Call your doctor now or seek immediate medical care if:  · You develop new symptoms of aortic valve stenosis, such as chest pain, dizziness, or shortness of breath. · You have new or increased shortness of breath.   · You are dizzy or lightheaded, or you feel like you may faint. · You have sudden weight gain, such as more than 2 to 3 pounds in a day or 5 pounds in a week. (Your doctor may suggest a different range of weight gain.)  · You have increased swelling in your legs, ankles, or feet. Watch closely for changes in your health, and be sure to contact your doctor if:  · You have trouble making healthy lifestyle changes. · You want more information about healthy lifestyle changes. Where can you learn more? Go to http://cezar-pamela.info/. Enter A660 in the search box to learn more about \"Aortic Valve Stenosis: Care Instructions. \"  Current as of: March 17, 2017  Content Version: 11.3  © 3628-4457 [employer]. Care instructions adapted under license by [employer] (which disclaims liability or warranty for this information). If you have questions about a medical condition or this instruction, always ask your healthcare professional. [place] [address] any warranty or liability for your use of this information.

## 2017-08-03 NOTE — PROGRESS NOTES
Patient brought medications list    1. Have you been to the ER, urgent care clinic since your last visit? Hospitalized since your last visit? No    2. Have you seen or consulted any other health care providers outside of the [address] [place] [address] since your last visit? Include any pap smears or colon screening. Yes Where: Dr Jacki Nava, Skin cancer/ Dr Aguilar Burn surgery/ Dermatology skin cancer     3. Since your last visit, have you had any of the following symptoms? chest pains, shortness of breath and swelling in legs/arms. 4.  Have you had any blood work, X-rays or cardiac testing? Yes Where: [facility]     Requested: NO     In [facility]: NO    5. Where do you normally have your labs drawn? [facility]    6. Do you need any refills today?    No

## 2017-08-03 NOTE — PROGRESS NOTES
HISTORY OF PRESENT ILLNESS  Watton  is a 73 y.o. female. HPI Comments: 7/17 worsening RESTREPO;   11/16 feels better  8/16 wants to discuss possible AVR  8/16 c/o dysphagia- solids/liquids f/u Dr Peyman Mathews  2/16 no more falls  11/15 Michael Franklin on concrete twice as she tripped, no dizziness/LOC- found with rib and pelvic fractures- not admitted      CHF   The history is provided by the medical records. This is a chronic problem. Associated symptoms include shortness of breath. Pertinent negatives include no chest pain and no headaches. Hypertension   The history is provided by the medical records. This is a chronic problem. Associated symptoms include shortness of breath. Pertinent negatives include no chest pain and no headaches. Palpitations    The history is provided by the medical records (h/o AFib). This is a recurrent problem. The problem occurs rarely (2/month). Episode Length: once seemed to last a few hours. Associated symptoms include lower extremity edema and shortness of breath. Pertinent negatives include no fever, no malaise/fatigue, no chest pain, no claudication, no orthopnea, no PND, no nausea, no vomiting, no headaches, no dizziness and no cough. Her past medical history is significant for hypertension. Valvular Heart Disease   The history is provided by the medical records (MS/MR/AS). This is a chronic problem. Associated symptoms include shortness of breath. Pertinent negatives include no chest pain and no headaches. Cholesterol Problem   The history is provided by the medical records. This is a chronic problem. Associated symptoms include shortness of breath. Pertinent negatives include no chest pain and no headaches. Shortness of Breath   The history is provided by the patient. This is a new (more of tiredness) problem. The problem occurs intermittently. The current episode started more than 1 week ago. The problem has been gradually worsening. Associated symptoms include leg swelling. Pertinent negatives include no fever, no headaches, no cough, no wheezing, no PND, no orthopnea, no chest pain, no vomiting, no rash and no claudication. The problem's precipitants include exercise (walking in grocery store, cleaning; 10/15 shopping; 2/16 much better; 2/17 1.5 miles; 7/17 grocery store). Leg Swelling   The history is provided by the patient. This is a new problem. The current episode started more than 2 days ago. The problem occurs every several days. The problem has not changed since onset. Associated symptoms include shortness of breath. Pertinent negatives include no chest pain and no headaches. The symptoms are aggravated by standing. The symptoms are relieved by sleep. Review of Systems   Constitutional: Negative for chills, fever, malaise/fatigue and weight loss. HENT: Negative for nosebleeds. Eyes: Negative for discharge. Respiratory: Positive for shortness of breath. Negative for cough and wheezing. Cardiovascular: Positive for palpitations and leg swelling. Negative for chest pain, orthopnea, claudication and PND. Gastrointestinal: Negative for diarrhea, nausea and vomiting. Genitourinary: Negative for dysuria and hematuria. Musculoskeletal: Negative for joint pain. Skin: Negative for rash. Neurological: Negative for dizziness, seizures, loss of consciousness and headaches. Endo/Heme/Allergies: Negative for polydipsia. Does not bruise/bleed easily. Psychiatric/Behavioral: Negative for depression and substance abuse. The patient does not have insomnia.       Allergies   Allergen Reactions    Latex Itching    Adhesive Other (comments)     Blisters skin    Amoxicillin Hives    Bactrim [Sulfamethoprim] Nausea Only       Past Medical History:   Diagnosis Date    A-fib [facility])     Aortic valve disorders 8/31/2015    7/15 mild AS     Arrhythmia     a-fib     Arthritis     Autoimmune disease ([facility] [address]     lupus 2001  Reynauds disease  Sjogrens    CAD (coronary artery disease)     AFIB  2011 Dr Angela Patton Chest pain, unspecified     Poss atyp angina, will Rx medically now and consider cath in future if needed    Chronic diastolic heart failure (HCC)     Stable symptoms    Chronic kidney disease     Chronic kidney disease, unspecified     Chronic venous embolism and thrombosis of unspecified deep vessels of lower extremity     8/11    Congestive heart failure, unspecified     Edema     improving    Essential hypertension     Hyperlipidemia     Hypertension     Lupus ([address]     Mitral regurgitation and mitral stenosis 10/29/2015    7/15 mild     Mitral valve disorders 8/31/2015    7/15 mild MS/MR     Pancytopenia (HCC)     Paroxysmal atrial fibrillation (HCC)     Rheumatoid arthritis (HCC)        Family History   Problem Relation Age of Onset    Colon Polyps Brother     Heart Disease Neg Hx      Negative history of premature CAD or CVA    Heart Attack Neg Hx     Sudden Death Neg Hx     Stroke Neg Hx        Social History   Substance Use Topics    Smoking status: Never Smoker    Smokeless tobacco: Never Used    Alcohol use No      Comment: ocassional        Current Outpatient Prescriptions   Medication Sig    hydrALAZINE (APRESOLINE) 10 mg tablet Take 1 Tab by mouth three (3) times daily.  rosuvastatin (CRESTOR) 40 mg tablet Take 1 Tab by mouth nightly.  propafenone (RYTHMOL) 225 mg tablet Take 1 Tab by mouth three (3) times daily.  triamcinolone acetonide (KENALOG) 0.1 % ointment Apply  to affected area two (2) times a day. use thin layer    furosemide (LASIX) 40 mg tablet Take 1 Tab by mouth three (3) times daily. Take 2 tabs(80mg) in am and 1 tab 40 mg  at 2pm. May skip PM lasix if no edema/SOB    metolazone (ZAROXOLYN) 2.5 mg tablet Take 2.5 mg by mouth as needed.  acetaminophen (TYLENOL) 325 mg tablet Take 650 mg by mouth every four (4) hours as needed for Pain.     clotrimazole-betamethasone (LOTRISONE) topical cream Apply  to affected area two (2) times a day.  amLODIPine (NORVASC) 5 mg tablet Take 1 Tab by mouth daily.  Cholecalciferol, Vitamin D3, 1,000 unit cap Take 2,000 Units by mouth daily.  loperamide (IMODIUM) 2 mg capsule Take  by mouth as needed.  traMADol (ULTRAM) 50 mg tablet Take 50 mg by mouth every six (6) hours as needed for Pain.  Fesoterodine (TOVIAZ) 4 mg SR tablet Take 8 mg by mouth daily.  metoprolol (LOPRESSOR) 50 mg tablet Take 50 mg by mouth two (2) times a day.  gabapentin (NEURONTIN) 100 mg capsule Take 200 mg by mouth nightly.  sertraline (ZOLOFT) 50 mg tablet Take 100 mg by mouth daily.  isosorbide mononitrate ER (IMDUR) 60 mg CR tablet Take 60 mg by mouth every morning.  azaTHIOprine (IMURAN) 50 mg tablet Take 50 mg by mouth two (2) times a day.  warfarin (COUMADIN) 1 mg tablet Take 1 Tab by mouth daily.  predniSONE (DELTASONE) 5 mg tablet Take 5 mg by mouth two (2) times a day.  famotidine (PEPCID) 40 mg tablet Take 1 Tab by mouth two (2) times a day. (Patient taking differently: Take 40 mg by mouth daily. 2 am 1 pm)     No current facility-administered medications for this visit.          Past Surgical History:   Procedure Laterality Date    ABDOMEN SURGERY PROC UNLISTED      gall bladder    BREAST SURGERY PROCEDURE UNLISTED      reduction    ENDOSCOPY, COLON, DIAGNOSTIC      FULL ESOPHAGEAL MANOMETRY  7/4/2016         HX CHOLECYSTECTOMY      HX GYN      hyst    HX OTHER SURGICAL      lymph nodes removed and eyelids raised       Diagnostic Studies:  CARDIOLOGY STUDIES 7/5/2015 7/2/2015 4/14/2014 4/11/2014 9/1/2012 8/1/2012 9/1/2011   EKG Result - - - - - SR, IRBBB -   Myocardial Perfusion Scan Result - - - - inf/sept hypo, nl perfusion, nl EF (nl per pt in last 6 months (6-12/110 - -   Pharmacological Nuclear Stress Test Result - - lat ischemia, 63%EF - - - -   Echocardiogram - Complete Result - 60%EF, mild MR/MS, mild AS(mean grad 13), PAP57 - 65%EF, mild DD, mild MR, heavy MAC, PAP58 60% EF, mild MR, PAP 40 - 55% EF (per office note)   Doppler US Vascular Result neg DVT - - - - - -   Doppler US Arterial Result neg ANUPAM - - - - - -   Some recent data might be hidden   7/16 ECHO  SUMMARY:  Left ventricle: Systolic function was normal. Ejection fraction was  estimated to be 70 %. There were no regional wall motion abnormalities. There was mild concentric hypertrophy. Septal prominence noted (Sigmoid  appearance seen in certain views). Doppler parameters were consistent with  a reversible restrictive pattern, indicative of decreased left ventricular  diastolic compliance and/or increased left atrial pressure (grade 3  diastolic dysfunction). Right ventricle: The ventricle was mildly dilated with preserved systolic  function. Left atrium: The atrium was severely dilated. LA volume index was 60  ml/mï¾². Mitral valve: There was moderate to marked annular calcification. There  was moderately reduced leaflet separation. Transmitral velocity was  increased due to valvular stenosis. The findings were consistent with  moderate mitral stenosis estimated by valve mean pressure gradient and  visual estimate. There was mild regurgitation. Mean transmitral gradient  was 6 mmHg. Mitral valve area by pressure half-time was 2.2 cmï¾². Aortic valve: The valve was trileaflet. Leaflets exhibited moderately  increased thickness and normal cuspal separation. Transaortic velocity was  increased due to valvular stenosis. There was moderate stenosis. Systolic  area of 1 cmï¾² by planimetry. The peak valve velocity was 353 cm/s. Valve  mean gradient was 27 mmHg. Tricuspid valve: There was mild regurgitation. Pulmonary artery systolic  pressure: 45 mmHg. Summary Measurements  CW measurements:  Unspecified Anatomy:   AV Vmax was 3.53 m/s.  AV maxPG was 49.78 mmHg.  AV  meanPG was 27.4 mmHg.  MV meanPG was 5.95 mmHg.  MVA By PHT was 1.32 cm2.   PW measurements:  Unspecified Anatomy:   MVA By PHT was 2.52 cm2. COMPARISONS:  Comparison was made with the previous study of 05-Jul-2015. Aortic  stenosis has worsened, valve mean pressure gradient was estimated at 13  mmHg on previous exam. Mitral stenosis has worsened. Left atrium has  increased in size. Right ventricle size has increased. Pulmonary artery  pressure has decreased from 57 mmHg    Visit Vitals    /56    Pulse 73    Ht 5' 6\" (1.676 m)    Wt 78.5 kg (173 lb)    BMI 27.92 kg/m2       Ms. Persaud has a reminder for a \"due or due soon\" health maintenance. I have asked that she contact her primary care provider for follow-up on this health maintenance. Physical Exam   Constitutional: She is oriented to person, place, and time. She appears well-developed and well-nourished. No distress. HENT:   Head: Normocephalic and atraumatic. Mouth/Throat: Normal dentition. Eyes: Right eye exhibits no discharge. Left eye exhibits no discharge. No scleral icterus. Neck: Neck supple. No JVD present. Carotid bruit is not present. No thyromegaly present. Cardiovascular: Normal rate, regular rhythm, S1 normal, S2 normal and intact distal pulses. Exam reveals no gallop and no friction rub. Murmur heard. High-pitched harsh midsystolic murmur is present with a grade of 3/6  at the apex radiating to the neck  Pulmonary/Chest: Effort normal. She has no wheezes. She has rales (few basal chronic). Abdominal: Soft. She exhibits no mass. There is no tenderness. Musculoskeletal: She exhibits edema (trace R>L). Lymphadenopathy:        Right cervical: No superficial cervical adenopathy present. Left cervical: No superficial cervical adenopathy present. Neurological: She is alert and oriented to person, place, and time. Skin: Skin is warm and dry. No rash noted. Psychiatric: She has a normal mood and affect. Her behavior is normal.       ASSESSMENT and PLAN          Diagnoses and all orders for this visit:    1.  Acute on chronic diastolic congestive heart failure ([facility]  Comments:  chk echo; adv to take regular diuresis- take metolazone 2/wk  Orders:  -     metOLazone (ZAROXOLYN) 2.5 mg tablet; Take 1 Tab by mouth every Monday and Thursday.  -     METABOLIC PANEL, BASIC; Future  -     MAGNESIUM; Future  -     TSH 3RD GENERATION; Future  -     2D ECHO COMPLETE ADULT (TTE); Future    2. Mixed hyperlipidemia  Comments:  12/16 LDL87, ;    3. Pulmonary HTN ([facility]  Comments:  7/16 PAP45; 7/15 PAP 57    4. Atherosclerosis of native coronary artery of native heart without angina pectoris  Comments:  4/14 abnormal stress test with lat ischemia; med Rx    5. Paroxysmal atrial fibrillation ([facility]  Comments:  2/17 occ palp but SR on EKG; 11/16 SR on rythmol    6. Essential hypertension, benign  Comments:   controlled      7. Nonrheumatic aortic valve stenosis  Comments:   7/16 mean grad 27; 7/15 mild AS(mean grad 13)        Pertinent laboratory and test data reviewed and discussed with patient.   See patient instructions also for other medical advice given    Medications Discontinued During This Encounter   Medication Reason    metolazone (ZAROXOLYN) 2.5 mg tablet Reorder       Follow-up Disposition:  Return in about 1 month (around 9/3/2017), or if symptoms worsen or fail to improve, for post test.

## 2017-08-03 NOTE — MR AVS SNAPSHOT
Visit Information Date & Time Provider Department Dept. Phone Encounter #  
 8/3/2017 11:00 AM Batool Garza MD [facility] [address] [place] [address] [id number] Follow-up Instructions Return in about 1 month (around 9/3/2017), or if symptoms worsen or fail to improve, for post test.  
  
Your Appointments 8/23/2017 10:45 AM  
PROCEDURE with [place] ECHO [facility] [place] ([address]) Appt Note: Safia De Luna [address], [address] [facility] [address]  
[address], [address]352 [facility] 4300 [facility] 9/14/2017  9:30 AM  
ESTABLISHED PATIENT with Batool Garza MD  
[facility]) Appt Note: 1 month post Echo/labs [address], [address] [facility] [address]  
[address], [address]352 [facility] 4300 [facility] Upcoming Health Maintenance Date Due Hepatitis C Screening 1945 DTaP/Tdap/Td series (1 - Tdap) 12/12/1968 BREAST CANCER SCRN MAMMOGRAM 12/12/1997 ZOSTER VACCINE AGE 60> 10/12/2007 GLAUCOMA SCREENING Q2Y 12/12/2012 OSTEOPOROSIS SCREENING (DEXA) 12/12/2012 Pneumococcal 65+ High/Highest Risk (1 of 2 - PCV13) 12/12/2012 MEDICARE YEARLY EXAM 12/12/2012 FOBT Q 1 YEAR AGE 50-75 8/9/2014 INFLUENZA AGE 9 TO ADULT 8/1/2017 Allergies as of 8/3/2017  Review Complete On: 8/3/2017 By: Batool Garza MD  
  
 Severity Noted Reaction Type Reactions Latex  07/28/2014    Itching Adhesive High 12/26/2012   Topical Other (comments) Blisters skin Amoxicillin High 12/26/2012   Systemic Hives Bactrim [Sulfamethoprim] High 12/26/2012   Side Effect Nausea Only Current Immunizations  Never Reviewed No immunizations on file. Not reviewed this visit You Were Diagnosed With   
  
 Codes Comments  Acute on chronic diastolic congestive heart failure ([facility] [address]    -  Primary ICD-10-CM: I50.33 
 ICD-9-CM: 428.33, 428.0 chk echo; adv to take regular diuresis- take metolazone 2/wk Mixed hyperlipidemia     ICD-10-CM: E78.2 ICD-9-CM: 272.2 12/16 LDL87, ;  
 Pulmonary HTN ([facility] [address]     ICD-10-CM: I27.2 ICD-9-CM: 416.8 7/16 PAP45; 7/15 PAP 57 Atherosclerosis of native coronary artery of native heart without angina pectoris     ICD-10-CM: I25.10 ICD-9-CM: 414.01 4/14 abnormal stress test with lat ischemia; med Rx Paroxysmal atrial fibrillation (HCC)     ICD-10-CM: I48.0 ICD-9-CM: 427.31 2/17 occ palp but SR on EKG; 11/16 SR on rythmol Essential hypertension, benign     ICD-10-CM: I10 
ICD-9-CM: 401.1  controlled Nonrheumatic aortic valve stenosis     ICD-10-CM: I35.0 ICD-9-CM: 424.1  7/16 mean grad 27; 7/15 mild AS(mean grad 13) Vitals BP Pulse Height(growth percentile) Weight(growth percentile) BMI OB Status 139/56 73 5' 6\" (1.676 m) 173 lb (78.5 kg) 27.92 kg/m2 Hysterectomy Smoking Status Never Smoker Vitals History BMI and BSA Data Body Mass Index Body Surface Area  
 27.92 kg/m 2 1.91 m 2 Preferred Pharmacy Pharmacy Name Phone [address], [place] [address] [address] [facility] [phone] Your Updated Medication List  
  
   
This list is accurate as of: 8/3/17 11:38 AM.  Always use your most recent med list.  
  
  
  
  
 acetaminophen 325 mg tablet Commonly known as:  TYLENOL Take 650 mg by mouth every four (4) hours as needed for Pain. amLODIPine 5 mg tablet Commonly known as:  Larimer Shield Take 1 Tab by mouth daily. cholecalciferol 1,000 unit Cap Commonly known as:  VITAMIN D3 Take 2,000 Units by mouth daily. clotrimazole-betamethasone topical cream  
Commonly known as:  Dorlene Dear Apply  to affected area two (2) times a day. Indications: pt stopped  
  
 famotidine 40 mg tablet Commonly known as:  PEPCID  
 Take 1 Tab by mouth two (2) times a day. Fesoterodine 4 mg SR tablet Commonly known as:  [employer] Take 8 mg by mouth daily. Indications: stopped per Dr Kerrie Jordan  
  
 furosemide 40 mg tablet Commonly known as:  LASIX Take 1 Tab by mouth three (3) times daily. Take 2 tabs(80mg) in am and 1 tab 40 mg  at 2pm. May skip PM lasix if no edema/SOB  
  
 gabapentin 100 mg capsule Commonly known as:  NEURONTIN Take 200 mg by mouth nightly. hydrALAZINE 10 mg tablet Commonly known as:  APRESOLINE Take 1 Tab by mouth three (3) times daily. IMURAN 50 mg tablet Generic drug:  azaTHIOprine Take 50 mg by mouth two (2) times a day. Indications: switched md  
  
 isosorbide mononitrate ER 60 mg CR tablet Commonly known as:  IMDUR Take 60 mg by mouth every morning. loperamide 2 mg capsule Commonly known as:  IMODIUM Take  by mouth as needed. LOPRESSOR 50 mg tablet Generic drug:  metoprolol tartrate Take 50 mg by mouth two (2) times a day. loratadine 10 mg tablet Commonly known as:  Nilay Sujatha Take 10 mg by mouth.  
  
 metOLazone 2.5 mg tablet Commonly known as:  Stoney Colder Take 1 Tab by mouth every Monday and Thursday. predniSONE 5 mg tablet Commonly known as:  [facility] Take 5 mg by mouth two (2) times a day. propafenone 225 mg tablet Commonly known as:  RYTHMOL Take 1 Tab by mouth three (3) times daily. rosuvastatin 40 mg tablet Commonly known as:  CRESTOR Take 1 Tab by mouth nightly. traMADol 50 mg tablet Commonly known as:  ULTRAM  
Take 50 mg by mouth every six (6) hours as needed for Pain.  
  
 triamcinolone acetonide 0.1 % ointment Commonly known as:  KENALOG Apply  to affected area two (2) times a day. use thin layer   Indications: pt stopped  
  
 warfarin 1 mg tablet Commonly known as:  COUMADIN Take 1 Tab by mouth daily. ZOLOFT 50 mg tablet Generic drug:  sertraline Take 100 mg by mouth daily. Prescriptions Sent to Pharmacy Refills  
 metOLazone (ZAROXOLYN) 2.5 mg tablet 1 Sig: Take 1 Tab by mouth every Monday and Thursday. Class: Normal  
 Pharmacy: [address] [place] [address] #: [phone] Route: Oral  
  
Follow-up Instructions Return in about 1 month (around 9/3/2017), or if symptoms worsen or fail to improve, for post test.  
  
To-Do List   
 Around 08/06/2017 Cardiac Services:  2D ECHO COMPLETE ADULT (TTE) Around 08/10/2017 Lab:  MAGNESIUM Around 08/10/2017 Lab:  METABOLIC PANEL, BASIC Around 08/10/2017 Lab:  TSH 3RD GENERATION Patient Instructions Medications Discontinued During This Encounter Medication Reason  metolazone (ZAROXOLYN) 2.5 mg tablet Reorder Orders Placed This Encounter  METABOLIC PANEL, BASIC Standing Status:   Future Standing Expiration Date:   11/3/2017  MAGNESIUM Standing Status:   Future Standing Expiration Date:   11/3/2017  
 TSH 3RD GENERATION Standing Status:   Future Standing Expiration Date:   11/3/2017  2D ECHO COMPLETE ADULT (TTE) Standing Status:   Future Standing Expiration Date:   1/30/2018 Order Specific Question:   Reason for Exam: Answer:   chf, as, mr  
 metOLazone (ZAROXOLYN) 2.5 mg tablet Sig: Take 1 Tab by mouth every Monday and Thursday. Dispense:  30 Tab Refill:  1 Aortic Valve Stenosis: Care Instructions Your Care Instructions Having aortic valve stenosis means that the valve between your heart and the large blood vessel that carries blood to the body (aorta) has narrowed. That forces the heart to pump harder to get enough blood through the valve. As stenosis gets worse, the valve gets narrower. This can cause symptoms. Symptoms include chest pain, dizziness, fainting, or shortness of breath. Surgery can fix the valve. The most common surgery is to replace the aortic valve. Your doctor may want to delay valve replacement until you have severe narrowing. Follow-up care is a key part of your treatment and safety. Be sure to make and go to all appointments, and call your doctor if you are having problems. It's also a good idea to know your test results and keep a list of the medicines you take. How can you care for yourself at home? · Be safe with medicines. Take your medicines exactly as prescribed. Call your doctor if you think you are having a problem with your medicine. You will get more details on the specific medicines your doctor prescribes. · Plan your meals so that you are eating heart-healthy foods. ¨ Eat a variety of foods daily. Fresh fruits and vegetables and whole grains are good choices. ¨ Limit your fat intake, especially saturated and trans fat. ¨ Limit salt (sodium). ¨ Increase fiber in your diet. ¨ Limit alcohol. · Be active. Ask your doctor what type and level of exercise is safe for you. Walking is a good choice. Your doctor may suggest that you join a cardiac rehabilitation program so that you can have help increasing your physical activity safely. · Do not smoke. Smoking can make aortic valve stenosis worse. If you need help quitting, talk to your doctor about stop-smoking programs and medicines. These can increase your chances of quitting for good. · Stay at a healthy weight. Lose weight if you need to. · Avoid colds and flu. Get a pneumococcal vaccine shot. If you have had one before, ask your doctor if you need another dose. Get a flu vaccine every year. · Take care of your teeth and gums. Get regular dental checkups. Good dental health is important because bacteria can spread from infected teeth and gums to the heart valves. When should you call for help? Call 911 anytime you think you may need emergency care. For example, call if: · You passed out (lost consciousness). · You have symptoms of a heart attack. These may include: ¨ Chest pain or pressure, or a strange feeling in the chest. 
¨ Sweating. ¨ Shortness of breath. ¨ Nausea or vomiting. ¨ Pain, pressure, or a strange feeling in the back, neck, jaw, or upper belly or in one or both shoulders or arms. ¨ Lightheadedness or sudden weakness. ¨ A fast or irregular heartbeat. After you call 911, the  may tell you to chew 1 adult-strength or 2 to 4 low-dose aspirin. Wait for an ambulance. Do not try to drive yourself. Call your doctor now or seek immediate medical care if: 
· You develop new symptoms of aortic valve stenosis, such as chest pain, dizziness, or shortness of breath. · You have new or increased shortness of breath. · You are dizzy or lightheaded, or you feel like you may faint. · You have sudden weight gain, such as more than 2 to 3 pounds in a day or 5 pounds in a week. (Your doctor may suggest a different range of weight gain.) · You have increased swelling in your legs, ankles, or feet. Watch closely for changes in your health, and be sure to contact your doctor if: 
· You have trouble making healthy lifestyle changes. · You want more information about healthy lifestyle changes. Where can you learn more? Go to http://cezar-pamela.info/. Enter P600 in the search box to learn more about \"Aortic Valve Stenosis: Care Instructions. \" Current as of: March 17, 2017 Content Version: 11.3 © 9955-9797 [employer]. Care instructions adapted under license by [employer] (which disclaims liability or warranty for this information). If you have questions about a medical condition or this instruction, always ask your healthcare professional. [address] any warranty or liability for your use of this information. Please provide this summary of care documentation to your next provider. Your primary care clinician is listed as Jeff Bernard. If you have any questions after today's visit, please call [phone].

## 2017-08-24 NOTE — PROGRESS NOTES
Please contact patient and do the following asap  Discontinue metolazone that I had started. Get chest x-ray.   Rpt BMP, Mg and BNP in 7 days  Fax to PCP for increased glucose  If shortness of breath or significant, may need to come to emergency room

## 2017-08-25 NOTE — TELEPHONE ENCOUNTER
I have d/thomas metolazone   And sent a Rx for incr dose of metoprolol   Please make sure I see her next week with ekg   Thanks (Routing comment)

## 2017-08-25 NOTE — TELEPHONE ENCOUNTER
----- Message from Nicolás Moctezuma MD sent at 8/24/2017  4:46 PM EDT -----  Please contact patient and do the following asap  Discontinue metolazone that I had started. Get chest x-ray.   Rpt BMP, Mg and BNP in 7 days  Fax to PCP for increased glucose  If shortness of breath or significant, may need to come to emergency room

## 2017-08-25 NOTE — TELEPHONE ENCOUNTER
called and advised patient to have labs and chest xray on 8/31/17, will go to [facility]. Appointment on 9/1/17. Patient states understanding to stop Metolazone and inc metoprolol and to have labs prior to visit.   Labs faxed to PCP

## 2017-09-08 NOTE — IP AVS SNAPSHOT
[address] [address] [facility] Patient: Collette Profit MRN: [id number] :1945 You are allergic to the following Allergen Reactions Latex Itching Adhesive Other (comments) Blisters skin Amoxicillin Hives Bactrim (Sulfamethoprim) Nausea Only Recent Documentation Height Weight Breastfeeding? BMI OB Status Smoking Status 1.676 m 78.5 kg No 27.92 kg/m2 Hysterectomy Never Smoker Emergency Contacts Name Discharge Info Relation Home Work Mobile ByronAlma DISCHARGE CAREGIVER [3] Sister [23] [phone] Alma Matthews DISCHARGE CAREGIVER [3] Other Relative [6] [phone] About your hospitalization You were admitted on:  2017 You last received care in the:  [facility] [address] You were discharged on:  2017 Unit phone number:  [phone] Why you were hospitalized Your primary diagnosis was:  Not on File Your diagnoses also included:  Gi Bleed, Generalized Weakness, Acute Febrile Illness, Sepsis (Hcc), Chronic Diastolic Heart Failure (Hcc), Chest Pain, Unspecified, Paroxysmal Atrial Fibrillation (Hcc), Anemia, Chronic Kidney Disease, Unspecified Providers Seen During Your Hospitalizations Provider Role Specialty Primary office phone Raúl Foley MD Attending Provider Emergency Medicine [phone] Radha Sibley MD Attending Provider Internal Medicine [phone] Your Primary Care Physician (PCP) Primary Care Physician Office Phone Office Fax [address], [place] [address] Follow-up Information Follow up With Details Comments Contact Info Saira Denton MD  Make appt on discharge date  [address]  [address] [facility] 
[phone] [facility]   [address] [facility] [address] [phone] Your Appointments Tuesday October 10, 2017 10:00 AM EDT  
ESTABLISHED PATIENT with Nikolai Mireles MD  
[facility]) [address] [place] [address], [address] [place] [address] [phone] Current Discharge Medication List  
  
START taking these medications Dose & Instructions Dispensing Information Comments Morning Noon Evening Bedtime * OTHER Your last dose was: Your next dose is:    
   
   
 Incentive spirometry- use as directed Quantity:  1 Each Refills:  0  
     
   
   
   
  
 * OTHER Your last dose was: Your next dose is:    
   
   
 Check daily PT, INR. Call supervising physician at facility daily with results Quantity:  1 Each Refills:  0  
     
   
   
   
  
 * OTHER Your last dose was: Your next dose is:    
   
   
 Check CBC, CMP, MG in 3 days, results to supervising physician at facility immediately Quantity:  1 Each Refills:  0  
     
   
   
   
  
 spironolactone 25 mg tablet Commonly known as:  ALDACTONE Your last dose was: Your next dose is:    
   
   
 Dose:  25 mg Take 1 Tab by mouth daily. Quantity:  30 Tab Refills:  0  
     
   
   
   
  
 vancomycin 50 mg/mL oral solution (compounded) Your last dose was: Your next dose is:    
   
   
 Dose:  125 mg Take 2.5 mL by mouth every six (6) hours for 8 days. Quantity:  80 mL Refills:  0  
     
   
   
   
  
 * Notice: This list has 3 medication(s) that are the same as other medications prescribed for you. Read the directions carefully, and ask your doctor or other care provider to review them with you. CONTINUE these medications which have CHANGED Dose & Instructions Dispensing Information Comments Morning Noon Evening Bedtime  
 furosemide 40 mg tablet Commonly known as:  LASIX What changed:   
- when to take this - additional instructions Your last dose was: Your next dose is:    
   
   
 Dose:  40 mg Take 1 Tab by mouth daily. Quantity:  30 Tab Refills:  0  
     
   
   
   
  
 warfarin 1 mg tablet Commonly known as:  COUMADIN What changed:   
- how much to take 
- how to take this - when to take this 
- additional instructions Your last dose was: Your next dose is:    
   
   
 Goal INR is between 2 and 3. Check daily PT, INR. Coumadin to be dosed daily based on daily PT, INR by the supervising physician at facility Quantity:  30 Tab Refills:  0 CONTINUE these medications which have NOT CHANGED Dose & Instructions Dispensing Information Comments Morning Noon Evening Bedtime  
 cholecalciferol 1,000 unit Cap Commonly known as:  VITAMIN D3 Your last dose was: Your next dose is:    
   
   
 Dose:  2000 Units Take 2,000 Units by mouth daily. Refills:  0  
     
   
   
   
  
 loratadine 10 mg tablet Commonly known as:  Sharilyn Feil Your last dose was: Your next dose is:    
   
   
 Dose:  10 mg Take 10 mg by mouth. Refills:  0  
     
   
   
   
  
 metoprolol tartrate 100 mg IR tablet Commonly known as:  LOPRESSOR Your last dose was: Your next dose is:    
   
   
 Dose:  100 mg Take 1 Tab by mouth two (2) times a day. Quantity:  60 Tab Refills:  1  
     
   
   
   
  
 propafenone 225 mg tablet Commonly known as:  RYTHMOL Your last dose was: Your next dose is:    
   
   
 Dose:  225 mg Take 1 Tab by mouth three (3) times daily. Quantity:  270 Tab Refills:  3  
     
   
   
   
  
 rosuvastatin 40 mg tablet Commonly known as:  CRESTOR Your last dose was: Your next dose is:    
   
   
 Dose:  40 mg Take 1 Tab by mouth nightly. Quantity:  90 Tab Refills:  2 ZOLOFT 50 mg tablet Generic drug:  sertraline Your last dose was: Your next dose is:    
   
   
 Dose:  100 mg Take 100 mg by mouth daily. Refills:  0 STOP taking these medications   
 acetaminophen 325 mg tablet Commonly known as:  TYLENOL  
   
  
 amLODIPine 5 mg tablet Commonly known as:  NORVASC  
   
  
 clotrimazole-betamethasone topical cream  
Commonly known as:  LOTRISONE  
   
  
 famotidine 40 mg tablet Commonly known as:  PEPCID  
   
  
 gabapentin 100 mg capsule Commonly known as:  NEURONTIN  
   
  
 hydrALAZINE 10 mg tablet Commonly known as:  APRESOLINE  
   
  
 IMURAN 50 mg tablet Generic drug:  azaTHIOprine  
   
  
 isosorbide mononitrate ER 60 mg CR tablet Commonly known as:  IMDUR  
   
  
 loperamide 2 mg capsule Commonly known as:  IMODIUM  
   
  
 predniSONE 5 mg tablet Commonly known as:  DELTASONE  
   
  
 traMADol 50 mg tablet Commonly known as:  ULTRAM  
   
  
 triamcinolone acetonide 0.1 % ointment Commonly known as:  KENALOG Where to Get Your Medications Information on where to get these meds will be given to you by the nurse or doctor. ! Ask your nurse or doctor about these medications  
  furosemide 40 mg tablet OTHER  
 OTHER  
 OTHER  
 spironolactone 25 mg tablet  
 vancomycin 50 mg/mL oral solution (compounded)  
 warfarin 1 mg tablet Discharge Instructions DISCHARGE SUMMARY from Nurse The following personal items are in your possession at time of discharge: 
 
Dental Appliances: None Visual Aid: None Home Medications: None Jewelry: None Clothing: At bedside, Footwear, Pants, Shirt, Undergarments Other Valuables: None Personal Items Sent to Safe: none PATIENT INSTRUCTIONS: 
 
 
F-face looks uneven A-arms unable to move or move unevenly S-speech slurred or non-existent T-time-call 911 as soon as signs and symptoms begin-DO NOT go Back to bed or wait to see if you get better-TIME IS BRAIN. Warning Signs of HEART ATTACK Call 911 if you have these symptoms: 
? Chest discomfort. Most heart attacks involve discomfort in the center of the chest that lasts more than a few minutes, or that goes away and comes back. It can feel like uncomfortable pressure, squeezing, fullness, or pain. ? Discomfort in other areas of the upper body. Symptoms can include pain or discomfort in one or both arms, the back, neck, jaw, or stomach. ? Shortness of breath with or without chest discomfort. ? Other signs may include breaking out in a cold sweat, nausea, or lightheadedness. Don't wait more than five minutes to call [address]! Fast action can save your life. Calling 911 is almost always the fastest way to get lifesaving treatment. Emergency Medical Services staff can begin treatment when they arrive  up to an hour sooner than if someone gets to the hospital by car. The discharge information has been reviewed with the patient. The patient verbalized understanding. Discharge medications reviewed with the patient and appropriate educational materials and side effects teaching were provided. Patient armband removed and shredded Yicha Onlinehart Activation Thank you for requesting access to [facility]. Please follow the instructions below to securely access and download your online medical record. [facility] allows you to send messages to your doctor, view your test results, renew your prescriptions, schedule appointments, and more. How Do I Sign Up? 1. In your internet browser, go to [email or web address] 
 2. Click on the First Time User? Click Here link in the Sign In box. You will be redirect to the New Member Sign Up page. 3. Enter your Content Savvy Access Code exactly as it appears below. You will not need to use this code after youve completed the sign-up process. If you do not sign up before the expiration date, you must request a new code. PrimeStonehart Access Code: Activation code not generated Current Content Savvy Status: Patient Declined (This is the date your MyChart access code will ) 4. Enter the last four digits of your Social Security Number (xxxx) and Date of Birth (mm/dd/yyyy) as indicated and click Submit. You will be taken to the next sign-up page. 5. Create a Sagencet ID. This will be your Content Savvy login ID and cannot be changed, so think of one that is secure and easy to remember. 6. Create a Content Savvy password. You can change your password at any time. 7. Enter your Password Reset Question and Answer. This can be used at a later time if you forget your password. 8. Enter your e-mail address. You will receive e-mail notification when new information is available in [address] 9. Click Sign Up. You can now view and download portions of your medical record. 10. Click the Download Summary menu link to download a portable copy of your medical information. Additional Information If you have questions, please visit the Frequently Asked Questions section of the Content Savvy website at [email or web address] Remember, Content Savvy is NOT to be used for urgent needs. For medical emergencies, dial 911. Diet- Cardiac, Activity - as tolerated FALL PRECAUTIONS 
ASPIRATION PRECAUTIONS DECUBITUS PRECAUTIONS Incentive Spirometry Q30 minutes when awake PT, OT Evaluate and Treat Check daily PT, INR. Check CBC, CMP, Mg in 3 days F/u with PCP in 1 week F/u with GI in 2 weeks F/u with Cardiologist in 2 weeks Discharge Orders None MyChart Announcement We are excited to announce that we are making your provider's discharge notes available to you in BetUknowhart. You will see these notes when they are completed and signed by the physician that discharged you from your recent hospital stay. If you have any questions or concerns about any information you see in BetUknowhart, please call the Health Information Department where you were seen or reach out to your Primary Care Provider for more information about your plan of care. General Information Please provide this summary of care documentation to your next provider. Patient Signature:  ____________________________________________________________ Date:  ____________________________________________________________  
  
Shay Rogel Provider Signature:  ____________________________________________________________ Date:  ____________________________________________________________

## 2017-09-08 NOTE — IP AVS SNAPSHOT
Caitlin Guajardo 
 
 
 [address] Patient: Joseph Ellis MRN: [id number] :1945 Current Discharge Medication List  
  
START taking these medications Dose & Instructions Dispensing Information Comments Morning Noon Evening Bedtime * OTHER Your last dose was: Your next dose is:    
   
   
 Incentive spirometry- use as directed Quantity:  1 Each Refills:  0  
     
   
   
   
  
 * OTHER Your last dose was: Your next dose is:    
   
   
 Check daily PT, INR. Call supervising physician at facility daily with results Quantity:  1 Each Refills:  0  
     
   
   
   
  
 * OTHER Your last dose was: Your next dose is:    
   
   
 Check CBC, CMP, MG in 3 days, results to supervising physician at facility immediately Quantity:  1 Each Refills:  0  
     
   
   
   
  
 spironolactone 25 mg tablet Commonly known as:  ALDACTONE Your last dose was: Your next dose is:    
   
   
 Dose:  25 mg Take 1 Tab by mouth daily. Quantity:  30 Tab Refills:  0  
     
   
   
   
  
 vancomycin 50 mg/mL oral solution (compounded) Your last dose was: Your next dose is:    
   
   
 Dose:  125 mg Take 2.5 mL by mouth every six (6) hours for 8 days. Quantity:  80 mL Refills:  0  
     
   
   
   
  
 * Notice: This list has 3 medication(s) that are the same as other medications prescribed for you. Read the directions carefully, and ask your doctor or other care provider to review them with you. CONTINUE these medications which have CHANGED Dose & Instructions Dispensing Information Comments Morning Noon Evening Bedtime  
 furosemide 40 mg tablet Commonly known as:  LASIX What changed:   
- when to take this 
- additional instructions Your last dose was:     
   
Your next dose is:    
   
   
 Dose:  40 mg  
 Take 1 Tab by mouth daily. Quantity:  30 Tab Refills:  0  
     
   
   
   
  
 warfarin 1 mg tablet Commonly known as:  COUMADIN What changed:   
- how much to take 
- how to take this - when to take this 
- additional instructions Your last dose was: Your next dose is:    
   
   
 Goal INR is between 2 and 3. Check daily PT, INR. Coumadin to be dosed daily based on daily PT, INR by the supervising physician at facility Quantity:  30 Tab Refills:  0 CONTINUE these medications which have NOT CHANGED Dose & Instructions Dispensing Information Comments Morning Noon Evening Bedtime  
 cholecalciferol 1,000 unit Cap Commonly known as:  VITAMIN D3 Your last dose was: Your next dose is:    
   
   
 Dose:  2000 Units Take 2,000 Units by mouth daily. Refills:  0  
     
   
   
   
  
 loratadine 10 mg tablet Commonly known as:  Larafabio Ashley Your last dose was: Your next dose is:    
   
   
 Dose:  10 mg Take 10 mg by mouth. Refills:  0  
     
   
   
   
  
 metoprolol tartrate 100 mg IR tablet Commonly known as:  LOPRESSOR Your last dose was: Your next dose is:    
   
   
 Dose:  100 mg Take 1 Tab by mouth two (2) times a day. Quantity:  60 Tab Refills:  1  
     
   
   
   
  
 propafenone 225 mg tablet Commonly known as:  RYTHMOL Your last dose was: Your next dose is:    
   
   
 Dose:  225 mg Take 1 Tab by mouth three (3) times daily. Quantity:  270 Tab Refills:  3  
     
   
   
   
  
 rosuvastatin 40 mg tablet Commonly known as:  CRESTOR Your last dose was: Your next dose is:    
   
   
 Dose:  40 mg Take 1 Tab by mouth nightly. Quantity:  90 Tab Refills:  2 ZOLOFT 50 mg tablet Generic drug:  sertraline Your last dose was:     
   
Your next dose is:    
   
   
 Dose:  100 mg  
 Take 100 mg by mouth daily. Refills:  0 STOP taking these medications   
 acetaminophen 325 mg tablet Commonly known as:  TYLENOL  
   
  
 amLODIPine 5 mg tablet Commonly known as:  NORVASC  
   
  
 clotrimazole-betamethasone topical cream  
Commonly known as:  LOTRISONE  
   
  
 famotidine 40 mg tablet Commonly known as:  PEPCID  
   
  
 gabapentin 100 mg capsule Commonly known as:  NEURONTIN  
   
  
 hydrALAZINE 10 mg tablet Commonly known as:  APRESOLINE  
   
  
 IMURAN 50 mg tablet Generic drug:  azaTHIOprine  
   
  
 isosorbide mononitrate ER 60 mg CR tablet Commonly known as:  IMDUR  
   
  
 loperamide 2 mg capsule Commonly known as:  IMODIUM  
   
  
 predniSONE 5 mg tablet Commonly known as:  DELTASONE  
   
  
 traMADol 50 mg tablet Commonly known as:  ULTRAM  
   
  
 triamcinolone acetonide 0.1 % ointment Commonly known as:  KENALOG Where to Get Your Medications Information on where to get these meds will be given to you by the nurse or doctor. ! Ask your nurse or doctor about these medications  
  furosemide 40 mg tablet OTHER  
 OTHER  
 OTHER  
 spironolactone 25 mg tablet  
 vancomycin 50 mg/mL oral solution (compounded)  
 warfarin 1 mg tablet

## 2017-09-08 NOTE — Clinical Note
Status[de-identified] Inpatient [101] Type of Bed: Telemetry [19] Inpatient Hospitalization Certified Necessary for the Following Reasons: 3. Patient receiving treatment that can only be provided in an inpatient setting (further clarification in H&P documentation) Admitting Diagnosis: GI bleed [828780] Admitting Diagnosis: Acute febrile illness [2957381] Admitting Diagnosis: Generalized weakness [755331] Admitting Physician: Yuliya Gleason [[id number]] Attending Physician: Yuliya Gleason [[id number]] Estimated Length of Stay: 2 Midnights Discharge Plan[de-identified] Home with Office Follow-up

## 2017-09-09 PROBLEM — R53.1 GENERALIZED WEAKNESS: Status: ACTIVE | Noted: 2017-01-01

## 2017-09-09 PROBLEM — K92.2 GI BLEED: Status: ACTIVE | Noted: 2017-01-01

## 2017-09-09 PROBLEM — R50.9 ACUTE FEBRILE ILLNESS: Status: ACTIVE | Noted: 2017-01-01

## 2017-09-09 PROBLEM — A41.9 SEPSIS (HCC): Status: ACTIVE | Noted: 2017-01-01

## 2017-09-09 NOTE — PROGRESS NOTES
Pt feels better. No active bleeding. Still has diarrhea but brown stool, no melena   Labs, chart and vitals noted   Will consult cardio and place her on tele for ? CP.  D/w Dr. Amos León  Transfuse unit of blood and monitor H/H and INR  GI in put noted   D/w pt and family in detail, agree with transfusion

## 2017-09-09 NOTE — PROGRESS NOTES
Verbal report given to ANUEL Morataya by Ygnacio Bernheim, RN. Report consisted of patients Situation, Background, Assessment and   Recommendations(SBAR).  Information from the following report(s) SBAR, ED Summary, Intake/Output, MAR, Recent Results and Cardiac Rhythm NSR was reviewed with the receiving nurse.

## 2017-09-09 NOTE — CONSULTS
[facility]  Ever Guzman [address] [facility], [address] [facility], [address]  [address] [facility]  Fax: [phone]  Cardiology Consult Note   Admission Date & Time  9/8/2017  8:55 PM    Requesting Physician: Dr. Amanda Orantes    Reason for Consult: chest pain    HPI: Name:     Kehnide Sher          Age:         73 y.o. Gender:   female          Ethnicity:      Ms. Persaud c/o subjective tachy-palpitations, chest pain, dyspnea. She denies radiation of chest pain to neck or arm. She reports near syncope. The clinical work up including EKG showed SR. Incomplete RBBB, possible inferior myocardial infarction; unchanged from prior EKG; CXR:Hypoinflation without radiographic evidence of an acute cardiopulmonary process. Blunting of the costophrenic angles and linear basilar opacities are without significant interval change since 2/18/2014 and are favored to represent scarring. Atherosclerotic disease; Laboratory: K 3.6 BUN 37 Cr 2.92 Hgb 7 Hct 25.1. Troponin I 0.03 CK 58. Current Medications:  Current Facility-Administered Medications   Medication Dose Route Frequency    propafenone (RYTHMOL) tablet 225 mg  225 mg Oral TID    rosuvastatin (CRESTOR) tablet 40 mg  40 mg Oral QHS    sertraline (ZOLOFT) tablet 100 mg  100 mg Oral DAILY    ciprofloxacin (CIPRO) 200 mg IVPB (premix)  200 mg IntraVENous Q12H    [START ON 9/10/2017] vancomycin (VANCOCIN) 1,000 mg in 0.9% sodium chloride (MBP/ADV) 250 mL adv  1,000 mg IntraVENous Q48H    0.9% sodium chloride infusion 250 mL  250 mL IntraVENous PRN    metroNIDAZOLE (FLAGYL) tablet 500 mg  500 mg Oral TID    metoprolol tartrate (LOPRESSOR) tablet 50 mg  50 mg Oral BID    [START ON 9/10/2017] loratadine (CLARITIN) tablet 10 mg  10 mg Oral DAILY    traMADol (ULTRAM) tablet 50 mg  50 mg Oral Q6H PRN    0.9% sodium chloride infusion  50 mL/hr IntraVENous CONTINUOUS          Allergies:   Allergies   Allergen Reactions    Latex Itching    Adhesive Other (comments)     Blisters skin    Amoxicillin Hives    Bactrim [Sulfamethoprim] Nausea Only         Past History:  Past Medical History:   Diagnosis Date    A[facility]fib ([facility] [address]     Aortic valve disorders 8/31/2015    7/15 mild AS     Arrhythmia     a-fib     Arthritis     Autoimmune disease (HCC)     lupus 2001  Reynauds disease  Sjogrens    CAD (coronary artery disease)     AFIB  2011 Dr Arlene Lennox Chest pain, unspecified     Poss atyp angina, will Rx medically now and consider cath in future if needed    Chronic diastolic heart failure (HCC)     Stable symptoms    Chronic kidney disease     Chronic kidney disease, unspecified     Chronic venous embolism and thrombosis of unspecified deep vessels of lower extremity     8/11    Congestive heart failure, unspecified     Edema     improving    Essential hypertension     Hyperlipidemia     Hypertension     Lupus ([facility] [address]     Mitral regurgitation and mitral stenosis 10/29/2015    7/15 mild     Mitral valve disorders 8/31/2015    7/15 mild MS/MR     Pancytopenia (HCC)     Paroxysmal atrial fibrillation (HCC)     Rheumatoid arthritis (HCC)        Past Surgical History:   Procedure Laterality Date    ABDOMEN SURGERY PROC UNLISTED      gall bladder    BREAST SURGERY PROCEDURE UNLISTED      reduction    ENDOSCOPY, COLON, DIAGNOSTIC      FULL ESOPHAGEAL MANOMETRY  7/4/2016         HX CHOLECYSTECTOMY      HX GYN      hyst    HX OTHER SURGICAL      lymph nodes removed and eyelids raised       Family History   Problem Relation Age of Onset    Colon Polyps Brother     Heart Disease Neg Hx      Negative history of premature CAD or CVA    Heart Attack Neg Hx     Sudden Death Neg Hx     Stroke Neg Hx        Social History     Social History    Marital status:      Spouse name: N/A    Number of children: N/A    Years of education: N/A     Social History Main Topics    Smoking status: Never Smoker    Smokeless tobacco: Never Used    Alcohol use No      Comment: ocassional    Drug use: No    Sexual activity: No     Other Topics Concern    None     Social History Narrative         Review of Symptoms:     Constitutional: Negative fever, chills. Head: Headache. Eyes: Blurred vision. ENT: Negative epistaxis. Respiratory: No hemoptysis. Cardiovascular: See HPI. Gastrointestinal: Negative hematochezia, hematemesis. Genitourinary: Negative hematuria. Musculoskeletal: Joint pain. Neurological: Negative focal weakness. Skin: Negative rash. Vital Signs:    Visit Vitals    /64 (BP 1 Location: Left arm, BP Patient Position: At rest)    Pulse 77    Temp 98.5 °F (36.9 °C)    Resp 18    Ht 5' 6\" (1.676 m)    Wt 77.1 kg (170 lb)    SpO2 93%    Breastfeeding No    BMI 27.44 kg/m2         Physical Assessment:    General: No acute distress. Skin: Warm and dry. HEENT: Head is normocephalic and atraumatic. Neck: Supple. No thyromegaly or jugular venous distension. Chest: Symmetric    Back: No presacral edema. Lungs: Clear bilaterally. Heart:  S1 and S2 are normal. II/VI SM>     Abdomen: Soft. Genitourinary: Deferred. Extremities: No edema. Neurologic: Cranial nerves II-XII are grossly intact. Musculoskeletal: No obvious muscle or joint deformities. Psychiatric: Pt. Is awake, alert and oriented X3.  Affect seems normal.        Laboratory Data:    Labs: Results:       Chemistry Recent Labs      09/09/17 0630 09/08/17 2138   GLU  117*  137*   NA  143  139   K  3.6  3.7   CL  112*  108   CO2  21  24   BUN  37*  35*   CREA  2.92*  2.71*   CA  7.6*  8.1*   MG   --   2.3   AGAP  10  7   BUCR  13  13   AP  84  87   TP  5.8*  6.8   ALB  2.1*  2.2*   GLOB  3.7  4.6*   AGRAT  0.6*  0.5*      CBC w/Diff Recent Labs      09/09/17   0935  09/09/17 0630 09/08/17 2138   WBC   --    --   8.6   RBC   --    --   3.24*   HGB  7.0*  7.1*  7.6*   HCT  25.1*  24.6*  26.2*   PLT   --    -- 274   GRANS   --    --   74*   LYMPH   --    --   18*   EOS   --    --   0      Cardiac Enzymes Recent Labs      09/08/17   2138   CPK  58   CKND1  CALCULATION NOT PERFORMED WHEN RESULT IS BELOW LINEAR LIMIT      Coagulation Recent Labs      09/08/17 2138   PTP  23.9*   INR  2.2*   APTT  51.9*       Lipid Panel Lab Results   Component Value Date/Time    Cholesterol, total 160 12/08/2016 08:34 AM    HDL Cholesterol 40 12/08/2016 08:34 AM    LDL, calculated 87 12/08/2016 08:34 AM    VLDL, calculated 33 12/08/2016 08:34 AM    Triglyceride 165 12/08/2016 08:34 AM      BNP No results for input(s): BNPP in the last 72 hours. Liver Enzymes Recent Labs      09/09/17   0630   TP  5.8*   ALB  2.1*   AP  84   SGOT  24      Digoxin    Thyroid Studies Lab Results   Component Value Date/Time    TSH 1.77 09/08/2017 09:38 PM            Impressions:   Atypical Angina  Coronary artery disease  · 4/14 abnormal stress test with lat ischemia; med Rx  Chronic diastolic heart failure  Paroxysmal atrial fibrillation on Warfarin  · 2/17 occ palp but SR on EKG; 11/16 SR on rythmol  Aortic stenosis  · 7/16 mean grad 27; 7/15 mild AS(mean grad 13)  Hypertension  Hyperlipidemia  Chronic renal insufficiency  Chronic anemia  Plan:   Telemetry  Propafenone 224 mg tid/Metoprolol 50 mg bid/Rosuvastatin 40 mg qhs  Fasting lipid panel  Echocardiogram  Consider repeat nuclear stress test      Thank you for calling. Ochoa Mendoza, [address] [place] [address], [address] [place] [address] Office  [phone] Pager  9/9/2017, 1:17 PM

## 2017-09-09 NOTE — CONSULTS
[facility]   [email or web address]      Impression:   1. Chronic anemia w/o GI complaints or blood loss. 2. Hx dysphgia, denies now. Plan:     1. No endoscopic plans, prior EGD/[place]las noted. On Coumadin, INR 2.2  2. Cardiac eval and transfuse if symptomatic. 3. Iron studies. Chief Complaint: none      HPI:  Isidra Paniagua is a 73 y.o. female who is being seen on consult for anemia. No bleeding. Hx dysphagia. None now, hungry. Has had GI eval for anemia in past, neg. Bowels OK, denies any reg diarrhea.  Feeling better this AM.    PMH:   Past Medical History:   Diagnosis Date    [facility])     Aortic valve disorders 8/31/2015    7/15 mild AS     Arrhythmia     a-fib     Arthritis     Autoimmune disease (HCC)     lupus 2001  Reynauds disease  Sjogrens    CAD (coronary artery disease)     AFIB  2011 Dr Rosebud Schirmer Chest pain, unspecified     Poss atyp angina, will Rx medically now and consider cath in future if needed    Chronic diastolic heart failure (HCC)     Stable symptoms    Chronic kidney disease     Chronic kidney disease, unspecified     Chronic venous embolism and thrombosis of unspecified deep vessels of lower extremity     8/11    Congestive heart failure, unspecified     Edema     improving    Essential hypertension     Hyperlipidemia     Hypertension     Lupus ([facility] [address]     Mitral regurgitation and mitral stenosis 10/29/2015    7/15 mild     Mitral valve disorders 8/31/2015    7/15 mild MS/MR     Pancytopenia (HCC)     Paroxysmal atrial fibrillation (HCC)     Rheumatoid arthritis (HCC)        PSH:   Past Surgical History:   Procedure Laterality Date    ABDOMEN SURGERY PROC UNLISTED      gall bladder    BREAST SURGERY PROCEDURE UNLISTED      reduction    ENDOSCOPY, COLON, DIAGNOSTIC      FULL ESOPHAGEAL MANOMETRY  7/4/2016         HX CHOLECYSTECTOMY      HX GYN      hyst    HX OTHER SURGICAL      lymph nodes removed and eyelids raised       Social HX:   Social History     Social History    Marital status:      Spouse name: N/A    Number of children: N/A    Years of education: N/A     Occupational History    Not on file. Social History Main Topics    Smoking status: Never Smoker    Smokeless tobacco: Never Used    Alcohol use No      Comment: ocassional    Drug use: No    Sexual activity: No     Other Topics Concern    Not on file     Social History Narrative       FHX:   Family History   Problem Relation Age of Onset    Colon Polyps Brother     Heart Disease Neg Hx      Negative history of premature CAD or CVA    Heart Attack Neg Hx     Sudden Death Neg Hx     Stroke Neg Hx        Allergy:   Allergies   Allergen Reactions    Latex Itching    Adhesive Other (comments)     Blisters skin    Amoxicillin Hives    Bactrim [Sulfamethoprim] Nausea Only       Home Medications:     Prescriptions Prior to Admission   Medication Sig    metoprolol tartrate (LOPRESSOR) 100 mg IR tablet Take 1 Tab by mouth two (2) times a day.  loratadine (CLARITIN) 10 mg tablet Take 10 mg by mouth.  hydrALAZINE (APRESOLINE) 10 mg tablet Take 1 Tab by mouth three (3) times daily.  rosuvastatin (CRESTOR) 40 mg tablet Take 1 Tab by mouth nightly.  propafenone (RYTHMOL) 225 mg tablet Take 1 Tab by mouth three (3) times daily.  triamcinolone acetonide (KENALOG) 0.1 % ointment Apply  to affected area two (2) times a day. use thin layer   Indications: pt stopped    furosemide (LASIX) 40 mg tablet Take 1 Tab by mouth three (3) times daily. Take 2 tabs(80mg) in am and 1 tab 40 mg  at 2pm. May skip PM lasix if no edema/SOB    acetaminophen (TYLENOL) 325 mg tablet Take 650 mg by mouth every four (4) hours as needed for Pain.  clotrimazole-betamethasone (LOTRISONE) topical cream Apply  to affected area two (2) times a day. Indications: pt stopped    amLODIPine (NORVASC) 5 mg tablet Take 1 Tab by mouth daily.  (Patient taking differently: Take 10 mg by mouth daily.)    Cholecalciferol, Vitamin D3, 1,000 unit cap Take 2,000 Units by mouth daily.  loperamide (IMODIUM) 2 mg capsule Take  by mouth as needed.  traMADol (ULTRAM) 50 mg tablet Take 50 mg by mouth every six (6) hours as needed for Pain.  Fesoterodine (TOVIAZ) 4 mg SR tablet Take 8 mg by mouth daily. Indications: stopped per Dr Micaela Logan    gabapentin (NEURONTIN) 100 mg capsule Take 200 mg by mouth nightly.  sertraline (ZOLOFT) 50 mg tablet Take 100 mg by mouth daily.  isosorbide mononitrate ER (IMDUR) 60 mg CR tablet Take 60 mg by mouth every morning.  azaTHIOprine (IMURAN) 50 mg tablet Take 50 mg by mouth two (2) times a day. Indications: switched md    warfarin (COUMADIN) 1 mg tablet Take 1 Tab by mouth daily.  predniSONE (DELTASONE) 5 mg tablet Take 5 mg by mouth two (2) times a day.  famotidine (PEPCID) 40 mg tablet Take 1 Tab by mouth two (2) times a day. (Patient taking differently: Take 40 mg by mouth daily. 2 am 1 pm)       Review of Systems:     Constitutional: + fevers, chills, weight loss, fatigue. Skin: No rashes, pruritis, jaundice, ulcerations, erythema. HENT: No headaches, nosebleeds, sinus pressure, rhinorrhea, sore throat. Eyes: No visual changes, blurred vision, eye pain, photophobia, jaundice. Cardiovascular: + chest pain, heart palpitations. Respiratory: No cough, + SOB, wheezing, chest discomfort, orthopnea. Gastrointestinal: neg   Genitourinary: No dysuria, bleeding, discharge, pyuria. Musculoskeletal: No weakness, arthralgias, wasting. Endo: No sweats. Heme: No bruising, easy bleeding. Allergies: As noted. Neurological: Cranial nerves intact. Alert and oriented. Gait not assessed. Psychiatric:  No anxiety, depression, hallucinations.                  Visit Vitals    BP 98/57 (BP 1 Location: Right arm, BP Patient Position: At rest)    Pulse 67    Temp 98 °F (36.7 °C)    Resp 20    Ht 5' 6\" (1.676 m)    Wt 77.1 kg (170 lb)    SpO2 97%    Breastfeeding No    BMI 27.44 kg/m2       Physical Assessment:     constitutional: appearance: well developed, normal habitus, no deformities, in no acute distress. skin: inspection: no rashes, ulcers, icterus or other lesions; no clubbing or telangiectasias. palpation: no induration or subcutaneos nodules. eyes: inspection: normal conjunctivae and lids; no jaundice pupils: symmetrical, normoreactive to light, normal accommodation and size. ENMT: mouth: normal oral mucosa,lips and gums; good dentition. respiratory: effort: normal chest excursion; no intercostal retraction or accessory muscle use. cardiovascular: abdominal aorta: normal size and position; no bruits. palpation: PMI of normal size and position; normal rhythm; no thrill or murmurs. abdominal: abdomen: normal consistency; no tenderness or masses. hernias: no hernias appreciated. liver: normal size and consistency. spleen: not palpable. rectal: hemoccult/guaiac: not performed. musculoskeletal: digits and nails: not assessed. neurologic: cranial nerves: II-XII normal.   psychiatric: judgement/insight: within normal limits. memory: within normal limits for recent and remote events. mood and affect: no evidence of depression, anxiety or agitation. orientation: oriented to time, space and person.         Basic Metabolic Profile   Recent Labs      09/09/17 0630  09/08/17   2138   NA  143  139   K  3.6  3.7   CL  112*  108   CO2  21  24   BUN  37*  35*   GLU  117*  137*   CA  7.6*  8.1*   MG   --   2.3         CBC w/Diff    Recent Labs      09/09/17 0630  09/08/17 2138   WBC   --   8.6   RBC   --   3.24*   HGB  7.1*  7.6*   HCT  24.6*  26.2*   MCV   --   80.9   MCH   --   23.5*   MCHC   --   29.0*   RDW   --   18.5*   PLT   --   274    Recent Labs      09/08/17   2138   GRANS  74*   LYMPH  18*   EOS  0        Hepatic Function   Recent Labs      09/09/17   0630   ALB  2.1*   TP  5.8*   TBILI  0.2   SGOT 24   AP  84          Shaka Hogue MD, M.D. [facility], [facility]  [email or web address]

## 2017-09-09 NOTE — H&P
Hospitalist Admission Note    NAME: Jeremy Dunham   :  1945   MRN:  [id number]     Date/Time of admission:  2017 12:08 AM    Patient PCP: Kayleigh Barragan MD  ________________________________________________________________________    My assessment of this patient's clinical condition and my plan of care is as follows. Assessment / Plan:  1. SIRS - possible early sepsis with concern for gi source vs occult bacteremia vs cardiac source with significant known valvular disease and possible infectious seeding vs alternative  2. Acute on chronic anemia possibly secondary to GI blood loss; h/o iron deficiency, renal disease and b12 deficiency  3. Heme positive stools with reported diarrhea - r/o C. Diff after recent abx  4. Recent UTI, appears resolved  5. SHRAVAN on CKD IV with significant proteinuria  6. H/o SLE, need to r/o flare; h/o Raynaud disease, Sjogren's disease  7. Atrial fibrillation, on coumadin - currently in sinus rhythm; INR 2.2  8. H/o DVT in LE, on coumadin (? )  9. H/o distal esophageal stenosis, possible h/o dilatation in 2016  10. Chronic diastolic CHF  11. CAD  15. Benign essential HTN  13. HLD  14. H/o frequent falls at home    1. Admit and hydrate with need to follow bcx's drawn in ED; add stool cultures and evaluation for C. Diff; urine appears clean and no clinical evidence of pulmonary or neuro source. 2. Check ZEYNEP, esr, crp, procalcitonin and follow fever trend  3. Empirically start vanc/cipro/flagyl until source known or ruled out  4. Follow h/h q 6 and transfuse prn; check peripheral smear to r/o valvular shearing or alternate intrinsic cause  5. GI consult in am for heme positive stools and need for endoscopy; npo now  6. Check haptoglobin, retic  7. Check compliment levels to assess if SLE has contributed to recent renal function worsening; urine lytes  8. Consider CT abd/pelvis if no symptomatic improvement and renal function improves  9.  Remain cognizant of h/o chronic d-chf  10. Hold Imuran d/t concern for acute infection; consider pulse dose steroids in setting of possible sepsis and chronic use of low dose prednisone  11. Hold lasix for now, but plan to restart as soon as tolerated to avoid flash pulmonary edema  12. Hold all bp medications and neurontin d/t worsening fatigue    Code Status: full  Surrogate Decision Maker: patient    DVT Prophylaxis: scd's - holding coumadin in setting of acute anemia and possible gi bleed  GI Prophylaxis: PPI          Subjective:   CHIEF COMPLAINT: progressive weakness, fever    HISTORY OF PRESENT ILLNESS:     Peggy Guido is a 73 y.o.  female who presents with progressive weakness and fever. [facility] 1[facility]2 weeks [facility] for weakness and was suggested to have a UTI and was started on keflex. She has been compliant with such, but her weakness and fatigue progressed. She has now developed fever and dark diarrhea (but endorses that she has had issues with diarrhea on a chronically intermittent basis). She presented to [facility] ED d/t progression of such and was noted to be more anemic and febrile with a temp of 102. She is heme positive but stool reportedly appears brown. Her CXR is unchanged, wbc normal, urine appears clean (despite other abnormalities), and mentation appears to be at baseline. She denies headache, visual changes, cp, sob, cough, sputum production, or abd pain, n/v. She had labs drawn at [facility] 2 days ago and her creatinine was 2.04, which has now worsened. Her hgb was 7.6 (stable) and it appears that she is followed by a Heme/Onc physician and scheduled for what appears to be aranesp infusion on 9/27/17 for her anemia. We were asked to admit for work up and evaluation of the above problems. She is followed by Home Health.      Past Medical History:   Diagnosis Date    A-fib [facility])     Aortic valve disorders 8/31/2015    7/15 mild AS     Arrhythmia     a-fib     Arthritis     Autoimmune disease ([facility] [address] lupus 2001  Reynauds disease  Sjogrens    CAD (coronary artery disease)     AFIB  2011 Dr Yadi Bazzi Chest pain, unspecified     Poss atyp angina, will Rx medically now and consider cath in future if needed    Chronic diastolic heart failure (HCC)     Stable symptoms    Chronic kidney disease     Chronic kidney disease, unspecified     Chronic venous embolism and thrombosis of unspecified deep vessels of lower extremity     8/11    Congestive heart failure, unspecified     Edema     improving    Essential hypertension     Hyperlipidemia     Hypertension     Lupus ([facility] [address]     Mitral regurgitation and mitral stenosis 10/29/2015    7/15 mild     Mitral valve disorders 8/31/2015    7/15 mild MS/MR     Pancytopenia (HCC)     Paroxysmal atrial fibrillation (HCC)     Rheumatoid arthritis (HCC)         Past Surgical History:   Procedure Laterality Date    ABDOMEN SURGERY PROC UNLISTED      gall bladder    BREAST SURGERY PROCEDURE UNLISTED      reduction    ENDOSCOPY, COLON, DIAGNOSTIC      FULL ESOPHAGEAL MANOMETRY  7/4/2016         HX CHOLECYSTECTOMY      HX GYN      hyst    HX OTHER SURGICAL      lymph nodes removed and eyelids raised       Social History   Substance Use Topics    Smoking status: Never Smoker    Smokeless tobacco: Never Used    Alcohol use No      Comment: ocassional        Family History   Problem Relation Age of Onset    Colon Polyps Brother     Heart Disease Neg Hx      Negative history of premature CAD or CVA    Heart Attack Neg Hx     Sudden Death Neg Hx     Stroke Neg Hx      Allergies   Allergen Reactions    Latex Itching    Adhesive Other (comments)     Blisters skin    Amoxicillin Hives    Bactrim [Sulfamethoprim] Nausea Only        Prior to Admission medications    Medication Sig Start Date End Date Taking? Authorizing Provider   metoprolol tartrate (LOPRESSOR) 100 mg IR tablet Take 1 Tab by mouth two (2) times a day.  8/24/17   Cathy Kennedy MD loratadine (CLARITIN) 10 mg tablet Take 10 mg by mouth. Historical Provider   hydrALAZINE (APRESOLINE) 10 mg tablet Take 1 Tab by mouth three (3) times daily. 2/27/17   Liliana Beltran NP   rosuvastatin (CRESTOR) 40 mg tablet Take 1 Tab by mouth nightly. 2/27/17   Liliana Beltran NP   propafenone (RYTHMOL) 225 mg tablet Take 1 Tab by mouth three (3) times daily. 2/1/17   Liliana Beltran NP   triamcinolone acetonide (KENALOG) 0.1 % ointment Apply  to affected area two (2) times a day. use thin layer   Indications: pt stopped    Historical Provider   furosemide (LASIX) 40 mg tablet Take 1 Tab by mouth three (3) times daily. Take 2 tabs(80mg) in am and 1 tab 40 mg  at 2pm. May skip PM lasix if no edema/SOB 11/7/16   Narinder Sainz MD   acetaminophen (TYLENOL) 325 mg tablet Take 650 mg by mouth every four (4) hours as needed for Pain. Historical Provider   clotrimazole-betamethasone (LOTRISONE) topical cream Apply  to affected area two (2) times a day. Indications: pt stopped    Historical Provider   amLODIPine (NORVASC) 5 mg tablet Take 1 Tab by mouth daily. Patient taking differently: Take 10 mg by mouth daily. 10/29/15   Narinder Sainz MD   Cholecalciferol, Vitamin D3, 1,000 unit cap Take 2,000 Units by mouth daily. Historical Provider   loperamide (IMODIUM) 2 mg capsule Take  by mouth as needed. Historical Provider   traMADol (ULTRAM) 50 mg tablet Take 50 mg by mouth every six (6) hours as needed for Pain. Historical Provider   Fesoterodine (TOVIAZ) 4 mg SR tablet Take 8 mg by mouth daily. Indications: stopped per Dr Drake Hooker Provider   gabapentin (NEURONTIN) 100 mg capsule Take 200 mg by mouth nightly. Historical Provider   sertraline (ZOLOFT) 50 mg tablet Take 100 mg by mouth daily. Historical Provider   isosorbide mononitrate ER (IMDUR) 60 mg CR tablet Take 60 mg by mouth every morning.     Historical Provider   azaTHIOprine (IMURAN) 50 mg tablet Take 50 mg by mouth two (2) times a day. Indications: switched md    Historical Provider   warfarin (COUMADIN) 1 mg tablet Take 1 Tab by mouth daily. 4/30/14   Malena Cohen MD   predniSONE (DELTASONE) 5 mg tablet Take 5 mg by mouth two (2) times a day. Historical Provider   famotidine (PEPCID) 40 mg tablet Take 1 Tab by mouth two (2) times a day. Patient taking differently: Take 40 mg by mouth daily. 2 am 1 pm 8/11/13   Jaylene Anders MD       REVIEW OF SYSTEMS:     I am not able to complete the review of systems because:    The patient is intubated and sedated    The patient has altered mental status due to his acute medical problems    The patient has baseline aphasia from prior stroke(s)    The patient has baseline dementia and is not reliable historian    The patient is in acute medical distress and unable to provide information           Total of 12 systems reviewed as follows:       POSITIVE= bolded text  Negative = text not bolded  General:  fever, chills, sweats, generalized weakness, weight loss/gain,      loss of appetite   Eyes:    blurred vision, eye pain, loss of vision, double vision  ENT:    rhinorrhea, pharyngitis   Respiratory:   cough, sputum production, SOB, RESTREPO, wheezing, pleuritic pain   Cardiology:   chest pain, palpitations, orthopnea, PND, edema, syncope   Gastrointestinal:  abdominal pain , N/V, diarrhea, dysphagia, constipation, bleeding   Genitourinary:  frequency, urgency, dysuria, hematuria, incontinence   Muskuloskeletal :  arthralgia, myalgia, back pain  Hematology:  easy bruising, nose or gum bleeding, lymphadenopathy   Dermatological: rash, ulceration, pruritis, color change / jaundice  Endocrine:   hot flashes or polydipsia   Neurological:  headache, dizziness, confusion, focal weakness, paresthesia,     Speech difficulties, memory loss, gait difficulty  Psychological: Feelings of anxiety, depression, agitation    Objective:   VITALS:    Visit Vitals    /47 (BP 1 Location: Right arm, BP Patient Position: At rest)    Pulse 69    Temp 99.2 °F (37.3 °C)    Resp 24    Ht 5' 6\" (1.676 m)    Wt 77.1 kg (170 lb)    SpO2 94%    BMI 27.44 kg/m2       PHYSICAL EXAM:    General:    Alert once awakened, cooperative, no distress, appears stated age, pale. HEENT: Atraumatic, anicteric sclerae, pink conjunctivae     No oral ulcers, mucosa dry and tachy, throat clear, dentition fair  Neck:  Supple, symmetrical,  thyroid: non tender  Lungs:   Clear to auscultation bilaterally. No Wheezing or Rhonchi. No rales. Chest wall:  No tenderness  No Accessory muscle use. Heart:   Regular  rhythm,  holosystolic murmur loudest at rusb; No edema  Abdomen:   Soft, non-tender. Not distended. Bowel sounds normal  Extremities: No cyanosis. No clubbing,      Skin turgor normal, Capillary refill normal, Radial dial pulse 2+  Skin:     Not pale. Not Jaundiced  No rashes   Psych:  Good insight. Not depressed. Not anxious or agitated. Neurologic: EOMs intact. No facial asymmetry. No aphasia or slurred speech. Symmetrical strength, Sensation grossly intact.  Alert and oriented X 4.     _______________________________________________________________________  Care Plan discussed with:    Comments   Patient x    Family      RN     Care Manager                    Consultant:      _______________________________________________________________________  Expected  Disposition:   Home with Family    HH/PT/OT/RN    SNF/LTC x   BETSY    ________________________________________________________________________  TOTAL TIME:  72 Minutes    Critical Care Provided     Minutes non procedure based      Comments    x Reviewed previous records   >50% of visit spent in counseling and coordination of care x Discussion with patient and/or family and questions answered       ________________________________________________________________________      Procedures: see electronic medical records for all procedures/Xrays and details which were not copied into this note but were reviewed prior to creation of Plan.     LAB DATA REVIEWED:    Recent Results (from the past 24 hour(s))   EKG, 12 LEAD, INITIAL    Collection Time: 09/08/17  9:08 PM   Result Value Ref Range    Ventricular Rate 82 BPM    Atrial Rate 82 BPM    P-R Interval 240 ms    QRS Duration 116 ms    Q-T Interval 428 ms    QTC Calculation (Bezet) 500 ms    Calculated P Axis 105 degrees    Calculated R Axis -5 degrees    Calculated T Axis -9 degrees    Diagnosis       Sinus rhythm with 1st degree AV block with premature atrial complexes with   aberrant conduction  Incomplete right bundle branch block  Inferior infarct (cited on or before 01-MAR-2013)  Abnormal ECG  When compared with ECG of 02-OCT-2014 14:46,  aberrant conduction is now present  DE interval has increased  Incomplete right bundle branch block is now present  Nonspecific T wave abnormality no longer evident in Lateral leads     URINALYSIS W/ RFLX MICROSCOPIC    Collection Time: 09/08/17  9:24 PM   Result Value Ref Range    Color YELLOW      Appearance CLOUDY      Specific gravity 1.025 1.005 - 1.030      pH (UA) 5.0 5.0 - 8.0      Protein >1000 (A) NEG mg/dL    Glucose NEGATIVE  NEG mg/dL    Ketone NEGATIVE  NEG mg/dL    Bilirubin NEGATIVE  NEG      Blood NEGATIVE  NEG      Urobilinogen 0.2 0.2 - 1.0 EU/dL    Nitrites NEGATIVE  NEG      Leukocyte Esterase NEGATIVE  NEG     URINE MICROSCOPIC ONLY    Collection Time: 09/08/17  9:24 PM   Result Value Ref Range    WBC 1 to 4 0 - 4 /hpf    RBC 0 to 1 0 - 5 /hpf    Epithelial cells FEW 0 - 5 /lpf    Bacteria 1+ (A) NEG /hpf    Amorphous Crystals 2+ (A) NEG    Hyaline cast 5 to 9 0 - 2 /lpf   CBC WITH AUTOMATED DIFF    Collection Time: 09/08/17  9:38 PM   Result Value Ref Range    WBC 8.6 4.6 - 13.2 K/uL    RBC 3.24 (L) 4.20 - 5.30 M/uL    HGB 7.6 (L) 12.0 - 16.0 g/dL    HCT 26.2 (L) 35.0 - 45.0 %    MCV 80.9 74.0 - 97.0 FL    MCH 23.5 (L) 24.0 - 34.0 PG    MCHC 29.0 (L) 31.0 - 37.0 g/dL RDW 18.5 (H) 11.6 - 14.5 %    PLATELET 006 939 - 000 K/uL    MPV 9.6 9.2 - 11.8 FL    NEUTROPHILS 74 (H) 40 - 73 %    LYMPHOCYTES 18 (L) 21 - 52 %    MONOCYTES 8 3 - 10 %    EOSINOPHILS 0 0 - 5 %    BASOPHILS 0 0 - 2 %    ABS. NEUTROPHILS 6.4 1.8 - 8.0 K/UL    ABS. LYMPHOCYTES 1.5 0.9 - 3.6 K/UL    ABS. MONOCYTES 0.7 0.05 - 1.2 K/UL    ABS. EOSINOPHILS 0.0 0.0 - 0.4 K/UL    ABS. BASOPHILS 0.0 0.0 - 0.1 K/UL    DF AUTOMATED     METABOLIC PANEL, COMPREHENSIVE    Collection Time: 09/08/17  9:38 PM   Result Value Ref Range    Sodium 139 136 - 145 mmol/L    Potassium 3.7 3.5 - 5.5 mmol/L    Chloride 108 100 - 108 mmol/L    CO2 24 21 - 32 mmol/L    Anion gap 7 3.0 - 18 mmol/L    Glucose 137 (H) 74 - 99 mg/dL    BUN 35 (H) 7.0 - 18 MG/DL    Creatinine 2.71 (H) 0.6 - 1.3 MG/DL    BUN/Creatinine ratio 13 12 - 20      GFR est AA 21 (L) >60 ml/min/1.73m2    GFR est non-AA 17 (L) >60 ml/min/1.73m2    Calcium 8.1 (L) 8.5 - 10.1 MG/DL    Bilirubin, total 0.2 0.2 - 1.0 MG/DL    ALT (SGPT) 22 13 - 56 U/L    AST (SGOT) 31 15 - 37 U/L    Alk.  phosphatase 87 45 - 117 U/L    Protein, total 6.8 6.4 - 8.2 g/dL    Albumin 2.2 (L) 3.4 - 5.0 g/dL    Globulin 4.6 (H) 2.0 - 4.0 g/dL    A-G Ratio 0.5 (L) 0.8 - 1.7     MAGNESIUM    Collection Time: 09/08/17  9:38 PM   Result Value Ref Range    Magnesium 2.3 1.6 - 2.6 mg/dL   CARDIAC PANEL,(CK, CKMB & TROPONIN)    Collection Time: 09/08/17  9:38 PM   Result Value Ref Range    CK 58 26 - 192 U/L    CK - MB <1.0 <3.6 ng/ml    CK-MB Index  0.0 - 4.0 %     CALCULATION NOT PERFORMED WHEN RESULT IS BELOW LINEAR LIMIT    Troponin-I, Qt. 0.03 0.0 - 0.045 NG/ML   PTT    Collection Time: 09/08/17  9:38 PM   Result Value Ref Range    aPTT 51.9 (H) 23.0 - 36.4 SEC   PROTHROMBIN TIME + INR    Collection Time: 09/08/17  9:38 PM   Result Value Ref Range    Prothrombin time 23.9 (H) 11.5 - 15.2 sec    INR 2.2 (H) 0.8 - 1.2     TYPE & SCREEN    Collection Time: 09/08/17  9:38 PM   Result Value Ref Range Crossmatch Expiration 09/11/2017     ABO/Rh(D) Heide Haynes NEGATIVE     Antibody screen NEG    TSH 3RD GENERATION    Collection Time: 09/08/17  9:38 PM   Result Value Ref Range    TSH 1.77 0.36 - 3.74 uIU/mL   POC LACTIC ACID    Collection Time: 09/08/17  9:39 PM   Result Value Ref Range    Lactic Acid (POC) 0.8 0.4 - 2.0 mmol/L       Danielle Jeff MD  Internal Medicine  Hospitalist Division

## 2017-09-09 NOTE — ROUTINE PROCESS
TRANSFER - OUT REPORT:    Verbal report given to Bonifacio Lal RN (name) on Suma Wills  being transferred to 460 (unit) for routine progression of care       Report consisted of patients Situation, Background, Assessment and   Recommendations(SBAR). Information from the following report(s) SBAR, ED Summary, Intake/Output, MAR, Recent Results and Cardiac Rhythm NSR was reviewed with the receiving nurse. Lines:   Peripheral IV 09/08/17 Left Antecubital (Active)   Site Assessment Clean, dry, & intact 9/8/2017  9:49 PM   Phlebitis Assessment 0 9/8/2017  9:49 PM   Infiltration Assessment 0 9/8/2017  9:49 PM   Dressing Status Clean, dry, & intact 9/8/2017  9:49 PM   Dressing Type Transparent 9/8/2017  9:49 PM   Hub Color/Line Status Patent; Flushed 9/8/2017  9:49 PM        Opportunity for questions and clarification was provided.       Patient transported with:   Monitor  Registered Nurse

## 2017-09-09 NOTE — ED NOTES
Hourly rounding complete. Safety  Pt resting   [ x ]  On stretcher with side rails up and bed in locked position, call bell within reach  [  ]  Sitting in chair with casters locked, call bell within reach    Toileting  [  ] pt denies need to use bathroom  [  ] pt assisted to bathroom  [  ] pt assisted with bedpan  [  ] pt independent to bathroom as needed    Ongoing Plan of Care  Plan of care and expected time for test and results reviewed with pt.     Pain Management / Comfort  [  ] dimmed lights  [  ] warm blanket provided  [  ] pain assessed  [ x ] monitor alarms reviewed

## 2017-09-09 NOTE — ED PROVIDER NOTES
HPI Comments: Seen at: 9/8/2017 9:01 PM    Isidra Paniagua is a 73 y.o. female with pertinent PMHx of LUPUS, A-fib, CHF, CAD, HTN,and HLD presenting via ambulance to the ED c/o weakness starting a week ago. Pt was diagnosed with a UTI a week ago at [facility] and started on keflex. She is on the last day of Abx. Per pt, starting today, she was unable to ambulate due to generalized weakness. She was able to ambulate independently at baseline. She has nausea, shortness of breath, and dark diarrhea for the past week. She denies fever or vomiting. She denies cough, CP or congestion. Pt is on coumadin Pt lives with family. No other acute symptoms or complaints were noted. PCP: Waleska Blair MD      The history is provided by the patient.         Past Medical History:   Diagnosis Date    A-fib [facility])     Aortic valve disorders 8/31/2015    7/15 mild AS     Arrhythmia     a-fib     Arthritis     Autoimmune disease ([facility]     lupus 2001  Reynauds disease  Sjogrens    CAD (coronary artery disease)     AFIB  2011 Dr Rosebud Schirmer Chest pain, unspecified     Poss atyp angina, will Rx medically now and consider cath in future if needed    Chronic diastolic heart failure (HCC)     Stable symptoms    Chronic kidney disease     Chronic kidney disease, unspecified     Chronic venous embolism and thrombosis of unspecified deep vessels of lower extremity     8/11    Congestive heart failure, unspecified     Edema     improving    Essential hypertension     Hyperlipidemia     Hypertension     Lupus ([address]     Mitral regurgitation and mitral stenosis 10/29/2015    7/15 mild     Mitral valve disorders 8/31/2015    7/15 mild MS/MR     Pancytopenia (HCC)     Paroxysmal atrial fibrillation (HCC)     Rheumatoid arthritis ([address]        Past Surgical History:   Procedure Laterality Date    ABDOMEN SURGERY PROC UNLISTED      gall bladder    BREAST SURGERY PROCEDURE UNLISTED      reduction    ENDOSCOPY, COLON, DIAGNOSTIC      FULL ESOPHAGEAL MANOMETRY  7/4/2016         HX CHOLECYSTECTOMY      HX GYN      hyst    HX OTHER SURGICAL      lymph nodes removed and eyelids raised         Family History:   Problem Relation Age of Onset    Colon Polyps Brother     Heart Disease Neg Hx      Negative history of premature CAD or CVA    Heart Attack Neg Hx     Sudden Death Neg Hx     Stroke Neg Hx        Social History     Social History    Marital status:      Spouse name: N/A    Number of children: N/A    Years of education: N/A     Occupational History    Not on file. Social History Main Topics    Smoking status: Never Smoker    Smokeless tobacco: Never Used    Alcohol use No      Comment: ocassional    Drug use: No    Sexual activity: No     Other Topics Concern    Not on file     Social History Narrative         ALLERGIES: Latex; Adhesive; Amoxicillin; and Bactrim [sulfamethoprim]    Review of Systems   Constitutional: Negative for chills and fever. HENT: Negative. Negative for congestion, sore throat and trouble swallowing. Eyes: Negative. Negative for visual disturbance. Respiratory: Positive for shortness of breath. Negative for cough and wheezing. Cardiovascular: Negative. Negative for chest pain, palpitations and leg swelling. Gastrointestinal: Positive for diarrhea and nausea. Negative for abdominal pain and vomiting. Genitourinary: Negative. Negative for difficulty urinating, dysuria, frequency and vaginal discharge. Musculoskeletal: Negative. Negative for back pain and myalgias. Skin: Negative. Negative for rash and wound. Neurological: Positive for weakness. Negative for dizziness, syncope, speech difficulty, light-headedness, numbness and headaches. Psychiatric/Behavioral: Negative. Negative for hallucinations, self-injury and suicidal ideas. All other systems reviewed and are negative.       Vitals:    09/08/17 2315 09/08/17 2330 09/08/17 2332 09/08/17 2345   BP: 108/61 113/44  109/47   Pulse: 69 70  69   Resp: 23 26  24   Temp:   99.2 °F (37.3 °C)    SpO2: 94% 96%  94%   Weight:       Height:                Physical Exam   Constitutional: She is oriented to person, place, and time. She appears well-developed and well-nourished. No distress. HENT:   Head: Normocephalic and atraumatic. Mouth/Throat: Oropharynx is clear and moist. Mucous membranes are pale and dry. Eyes: EOM are normal. Pupils are equal, round, and reactive to light. No scleral icterus. Pale conjunctiva   Neck: Trachea normal and normal range of motion. Neck supple. No JVD present. No thyromegaly present. Cardiovascular: Normal rate, regular rhythm, S1 normal and S2 normal.  Exam reveals no gallop and no friction rub. No murmur heard. Pulmonary/Chest: Effort normal and breath sounds normal. No accessory muscle usage. No respiratory distress. Abdominal: Soft. Normal appearance. She exhibits no distension. There is no tenderness. There is no rigidity, no rebound and no guarding. Genitourinary: Rectal exam shows guaiac positive stool. Genitourinary Comments: Brown stool. Rectal exam performed with female RN as chaperone    Musculoskeletal: She exhibits no tenderness. 3/5 strength in all extremities   Neurological: She is alert and oriented to person, place, and time. She has normal strength. No cranial nerve deficit or sensory deficit. Coordination normal.   Skin: Skin is warm and intact. No rash noted. There is pallor. Psychiatric: She has a normal mood and affect. Her speech is normal and behavior is normal.   Vitals reviewed. MDM  Number of Diagnoses or Management Options  Acute febrile illness:   Gastrointestinal hemorrhage, unspecified gastrointestinal hemorrhage type:   Generalized weakness:   Diagnosis management comments: Lisa Martell is a 73 y.o. Female coming in with family for generalized weakness. Temp 102, but normal lactate, BP, HR and WBC. No evidence of sepsis.  CXR shows some chronic scarring. UA neg. Patient temp improved with tylenol and vitals remain WNL. No obvious source and not septic so will hold abx. Will hold coumadin due to anemia and GI bleed. No abd TTP.         Amount and/or Complexity of Data Reviewed  Clinical lab tests: ordered and reviewed  Tests in the radiology section of CPT®: ordered and reviewed (CXR)  Tests in the medicine section of CPT®: ordered and reviewed (EKG)  Decide to obtain previous medical records or to obtain history from someone other than the patient: yes  Obtain history from someone other than the patient: yes  Review and summarize past medical records: yes  Discuss the patient with other providers: yes  Independent visualization of images, tracings, or specimens: yes (EKG, CXR)    Risk of Complications, Morbidity, and/or Mortality  Presenting problems: high  Diagnostic procedures: high  Management options: high        ED Course       Procedures      Vitals:  Patient Vitals for the past 12 hrs:   Temp Pulse Resp BP SpO2   09/08/17 2345 - 69 24 109/47 94 %   09/08/17 2332 99.2 °F (37.3 °C) - - - -   09/08/17 2330 - 70 26 113/44 96 %   09/08/17 2315 - 69 23 108/61 94 %   09/08/17 2300 - 69 21 112/51 93 %   09/08/17 2245 - 69 26 116/48 95 %   09/08/17 2230 - 69 26 114/50 98 %   09/08/17 2200 - 70 30 115/48 93 %   09/08/17 2145 - 71 29 120/52 97 %   09/08/17 2130 - 72 (!) 31 120/51 98 %   09/08/17 2122 (!) 102.6 °F (39.2 °C) 76 19 (!) 117/34 99 %   09/08/17 2115 - 75 28 121/51 92 %       Medications Ordered:  Medications   sodium chloride (NS) flush 5-10 mL (not administered)   acetaminophen (TYLENOL) tablet 1,000 mg (1,000 mg Oral Given 9/8/17 2126)   sodium chloride 0.9 % bolus infusion 1,000 mL (0 mL IntraVENous IV Completed 9/8/17 2308)       Lab Findings:  Recent Results (from the past 12 hour(s))   EKG, 12 LEAD, INITIAL    Collection Time: 09/08/17  9:08 PM   Result Value Ref Range    Ventricular Rate 82 BPM    Atrial Rate 82 BPM    P-R Interval 240 ms    QRS Duration 116 ms    Q-T Interval 428 ms    QTC Calculation (Bezet) 500 ms    Calculated P Axis 105 degrees    Calculated R Axis -5 degrees    Calculated T Axis -9 degrees    Diagnosis       Sinus rhythm with 1st degree AV block with premature atrial complexes with   aberrant conduction  Incomplete right bundle branch block  Inferior infarct (cited on or before 01-MAR-2013)  Abnormal ECG  When compared with ECG of 02-OCT-2014 14:46,  aberrant conduction is now present  ME interval has increased  Incomplete right bundle branch block is now present  Nonspecific T wave abnormality no longer evident in Lateral leads     URINALYSIS W/ RFLX MICROSCOPIC    Collection Time: 09/08/17  9:24 PM   Result Value Ref Range    Color YELLOW      Appearance CLOUDY      Specific gravity 1.025 1.005 - 1.030      pH (UA) 5.0 5.0 - 8.0      Protein >1000 (A) NEG mg/dL    Glucose NEGATIVE  NEG mg/dL    Ketone NEGATIVE  NEG mg/dL    Bilirubin NEGATIVE  NEG      Blood NEGATIVE  NEG      Urobilinogen 0.2 0.2 - 1.0 EU/dL    Nitrites NEGATIVE  NEG      Leukocyte Esterase NEGATIVE  NEG     URINE MICROSCOPIC ONLY    Collection Time: 09/08/17  9:24 PM   Result Value Ref Range    WBC 1 to 4 0 - 4 /hpf    RBC 0 to 1 0 - 5 /hpf    Epithelial cells FEW 0 - 5 /lpf    Bacteria 1+ (A) NEG /hpf    Amorphous Crystals 2+ (A) NEG    Hyaline cast 5 to 9 0 - 2 /lpf   CBC WITH AUTOMATED DIFF    Collection Time: 09/08/17  9:38 PM   Result Value Ref Range    WBC 8.6 4.6 - 13.2 K/uL    RBC 3.24 (L) 4.20 - 5.30 M/uL    HGB 7.6 (L) 12.0 - 16.0 g/dL    HCT 26.2 (L) 35.0 - 45.0 %    MCV 80.9 74.0 - 97.0 FL    MCH 23.5 (L) 24.0 - 34.0 PG    MCHC 29.0 (L) 31.0 - 37.0 g/dL    RDW 18.5 (H) 11.6 - 14.5 %    PLATELET 374 007 - 207 K/uL    MPV 9.6 9.2 - 11.8 FL    NEUTROPHILS 74 (H) 40 - 73 %    LYMPHOCYTES 18 (L) 21 - 52 %    MONOCYTES 8 3 - 10 %    EOSINOPHILS 0 0 - 5 %    BASOPHILS 0 0 - 2 %    ABS. NEUTROPHILS 6.4 1.8 - 8.0 K/UL    ABS.  LYMPHOCYTES 1.5 0.9 - 3.6 K/UL    ABS. MONOCYTES 0.7 0.05 - 1.2 K/UL    ABS. EOSINOPHILS 0.0 0.0 - 0.4 K/UL    ABS. BASOPHILS 0.0 0.0 - 0.1 K/UL    DF AUTOMATED     METABOLIC PANEL, COMPREHENSIVE    Collection Time: 09/08/17  9:38 PM   Result Value Ref Range    Sodium 139 136 - 145 mmol/L    Potassium 3.7 3.5 - 5.5 mmol/L    Chloride 108 100 - 108 mmol/L    CO2 24 21 - 32 mmol/L    Anion gap 7 3.0 - 18 mmol/L    Glucose 137 (H) 74 - 99 mg/dL    BUN 35 (H) 7.0 - 18 MG/DL    Creatinine 2.71 (H) 0.6 - 1.3 MG/DL    BUN/Creatinine ratio 13 12 - 20      GFR est AA 21 (L) >60 ml/min/1.73m2    GFR est non-AA 17 (L) >60 ml/min/1.73m2    Calcium 8.1 (L) 8.5 - 10.1 MG/DL    Bilirubin, total 0.2 0.2 - 1.0 MG/DL    ALT (SGPT) 22 13 - 56 U/L    AST (SGOT) 31 15 - 37 U/L    Alk.  phosphatase 87 45 - 117 U/L    Protein, total 6.8 6.4 - 8.2 g/dL    Albumin 2.2 (L) 3.4 - 5.0 g/dL    Globulin 4.6 (H) 2.0 - 4.0 g/dL    A-G Ratio 0.5 (L) 0.8 - 1.7     MAGNESIUM    Collection Time: 09/08/17  9:38 PM   Result Value Ref Range    Magnesium 2.3 1.6 - 2.6 mg/dL   CARDIAC PANEL,(CK, CKMB & TROPONIN)    Collection Time: 09/08/17  9:38 PM   Result Value Ref Range    CK 58 26 - 192 U/L    CK - MB <1.0 <3.6 ng/ml    CK-MB Index  0.0 - 4.0 %     CALCULATION NOT PERFORMED WHEN RESULT IS BELOW LINEAR LIMIT    Troponin-I, Qt. 0.03 0.0 - 0.045 NG/ML   PTT    Collection Time: 09/08/17  9:38 PM   Result Value Ref Range    aPTT 51.9 (H) 23.0 - 36.4 SEC   PROTHROMBIN TIME + INR    Collection Time: 09/08/17  9:38 PM   Result Value Ref Range    Prothrombin time 23.9 (H) 11.5 - 15.2 sec    INR 2.2 (H) 0.8 - 1.2     TYPE & SCREEN    Collection Time: 09/08/17  9:38 PM   Result Value Ref Range    Crossmatch Expiration 09/11/2017     ABO/Rh(D) O NEGATIVE     Antibody screen NEG    TSH 3RD GENERATION    Collection Time: 09/08/17  9:38 PM   Result Value Ref Range    TSH 1.77 0.36 - 3.74 uIU/mL   POC LACTIC ACID    Collection Time: 09/08/17  9:39 PM   Result Value Ref Range Lactic Acid (POC) 0.8 0.4 - 2.0 mmol/L       EKG Interpretation by ED physician:  Interpreted at 09/08/2017 at 21:09  Sinus rhythm rate of 82 bpm.  ms. No acute ischemic changes. X-ray, CT or radiology findings or impressions:  XR CHEST PORT   Final Result      Chest X-ray 1 V interpreted by radiologist showed the following impressions: 1. Hypoinflation without radiographic evidence of an acute cardiopulmonary process. 2.Blunting of the costophrenic angles and linear basilar opacities are without significant interval change since 2/18/2014 and are favored to represent scarring. 3. Atherosclerotic disease. Progress notes, consult notes, or additional procedure notes:  2113: Per nursing staff, rectal temp at 102. 6. Sepsis protocol is initiated. 2143: Rectal exam with female RN as chaperone showed brown stool, hemoccult positive. 11:52 PM Consult: I discussed care with Dr. Chanel Rooney (hospitalist). It was a standard discussion including patient history, chief complaint, available diagnostic results, and predicted treatment course. Dr. Evens Starr accepted admission. Diagnosis:   1. Gastrointestinal hemorrhage, unspecified gastrointestinal hemorrhage type    2. Acute febrile illness    3. Generalized weakness        Disposition: ADMIT    Scribe Attestation      Beverley Osorio acting as a scribe for and in the presence of Macrina Baig MD      September 08, 2017 at 9:07 PM       Provider Attestation:      I personally performed the services described in the documentation, reviewed the documentation, as recorded by the scribe in my presence, and it accurately and completely records my words and actions.  September 08, 2017 at 9:07 PM - Macrina Baig MD

## 2017-09-09 NOTE — ED NOTES
Pt came from home. Pt lives with her brother. Was treated at [facility] for UTI one week ago. Per medic patient has one more day of antibiotics to complete for UTI. Pts vitals are 119/60, 94% RA, . Pt is AOX4; Pt has history of COPD, LUPAS, pt is due to have two valves replaced soon. Pt is generally able to walk but was unable to ambulate due to weakness.

## 2017-09-10 NOTE — PROGRESS NOTES
[facility]      CARDIOLOGY PROGRESS NOTE  RECS:  Telemetry  Propafenone 224 mg tid/Metoprolol 50 mg bid/Rosuvastatin 40 mg qhs  Echocardiogram  Consider repeat nuclear stress test    ASSESSMENT:  Hospital Problems  Date Reviewed: 8/3/2017          Codes Class Noted POA    GI bleed ICD-10-CM: K92.2  ICD-9-CM: 578.9  9/9/2017 Unknown        Generalized weakness ICD-10-CM: R53.1  ICD-9-CM: 780.79  9/9/2017 Unknown        Acute febrile illness ICD-10-CM: R50.9  ICD-9-CM: 780.60  9/9/2017 Unknown        Sepsis ([facility] ICD-10-CM: A41.9  ICD-9-CM: 038.9, 995.91  9/9/2017 Unknown        Anemia ICD-10-CM: D64.9  ICD-9-CM: 285.9  5/20/2014 Yes        Chronic diastolic heart failure ([facility] ICD-10-CM: I50.32  ICD-9-CM: 428.32  Unknown Yes    Overview Addendum 2/3/2017 11:44 AM by Malena Cohen MD     2/17; 11/16; 8/16 [id number];   Stable symptoms;  11/16 much better chr edema             Chest pain, unspecified ICD-10-CM: R07.9  ICD-9-CM: 786.50  Unknown Yes    Overview Addendum 4/26/2013 12:16 PM by Malena Cohen MD     Likely musculoskeletal/pleurotoc; will Rx medically now and consider cath in future if needed             Chronic kidney disease, unspecified ICD-10-CM: N18.9  ICD-9-CM: 988. 9  Unknown Yes        Paroxysmal atrial fibrillation ([facility] ICD-10-CM: I48.0  ICD-9-CM: 427.31  Unknown Yes    Overview Addendum 2/3/2017 11:43 AM by Malena Cohen MD     2/17 occ palp but SR on EKG; 11/16 SR on rythmol  pt in SR since 3/13 atleast  5/14 amio d/thomas due to hyperthyroid; 4/15 nl TFT; 5/15; 10/15 SR on rhythmol  chr warfarin due to recurrent DVT; f/u INR PCP                     SUBJECTIVE:  C/o subjective palpitations. She c/o associated chest pain, dyspnea.      OBJECTIVE:    VS:   Visit Vitals    /65 (BP 1 Location: Left arm, BP Patient Position: At rest)    Pulse 79    Temp 98.5 °F (36.9 °C)    Resp 19    Ht 5' 6\" (1.676 m)    Wt 77.1 kg (170 lb)    SpO2 97%    Breastfeeding No    BMI 27.44 kg/m2       No intake or output data in the 24 hours ending 09/10/17 1404  TELE: normal sinus rhythm    General: No acute distress  HENT: Normocephalic, atraumatic. Neck :  Supple  Cardiac:  Normal S1/S2  Chest/Lungs:Decreased bases. Abdomen: Soft  Extremities:  No edema      Labs: Results:       Chemistry Recent Labs      09/10/17   0333  09/09/17   0630  09/08/17   2138   GLU  85  117*  137*   NA  142  143  139   K  3.6  3.6  3.7   CL  112*  112*  108   CO2  21  21  24   BUN  29*  37*  35*   CREA  2.07*  2.92*  2.71*   CA  7.3*  7.6*  8.1*   MG   --    --   2.3   AGAP  9  10  7   BUCR  14  13  13   AP   --   84  87   TP   --   5.8*  6.8   ALB   --   2.1*  2.2*   GLOB   --   3.7  4.6*   AGRAT   --   0.6*  0.5*      CBC w/Diff Recent Labs      09/10/17   0333  09/09/17   0935  09/09/17   0630  09/08/17   2138   WBC  6.2   --    --   8.6   RBC  3.51*   --    --   3.24*   HGB  8.5*  7.0*  7.1*  7.6*   HCT  28.8*  25.1*  24.6*  26.2*   PLT  223   --    --   274   GRANS  65   --    --   74*   LYMPH  24   --    --   18*   EOS  0   --    --   0      Cardiac Enzymes Recent Labs      09/09/17 1800 09/08/17 2138   CPK  146  58   CKND1  1.0  CALCULATION NOT PERFORMED WHEN RESULT IS BELOW LINEAR LIMIT      Coagulation Recent Labs      09/10/17   0333  09/09/17   1800  09/08/17   2138   PTP  26.5*  28.8*  23.9*   INR  2.5*  2.8*  2.2*   APTT   --    --   51.9*       Lipid Panel Lab Results   Component Value Date/Time    Cholesterol, total 94 09/10/2017 03:33 AM    HDL Cholesterol 41 09/10/2017 03:33 AM    LDL, calculated 36.4 09/10/2017 03:33 AM    VLDL, calculated 16.6 09/10/2017 03:33 AM    Triglyceride 83 09/10/2017 03:33 AM    CHOL/HDL Ratio 2.3 09/10/2017 03:33 AM      BNP No results for input(s): BNPP in the last 72 hours.    Liver Enzymes Recent Labs      09/09/17   0630   TP  5.8*   ALB  2.1*   AP  84   SGOT  24      Digoxin    Thyroid Studies Lab Results   Component Value Date/Time TSH 1.77 09/08/2017 09:38 PM              Thomas Brock MD   Pager # [phone]

## 2017-09-10 NOTE — ROUTINE PROCESS
Bedside shift change report given to [address] [facility] (oncoming nurse) by Pablo Gautam RN   (offgoing nurse). Report included the following information SBAR, Kardex, MAR and Recent Results.

## 2017-09-10 NOTE — PROGRESS NOTES
conducted an initial consultation and Spiritual Assessment for Jeremy Dunham, who is a 73 y.o.,female. Patients Primary Language is: [place]. According to the patients EMR Mandaeism Affiliation is: Pentecostalism. The reason the Patient came to the hospital is:   Patient Active Problem List    Diagnosis Date Noted    GI bleed 09/09/2017    Generalized weakness 09/09/2017    Acute febrile illness 09/09/2017    Sepsis ([address] 09/09/2017    Acute on chronic diastolic congestive heart failure ([address] 08/03/2017    Pulmonary HTN ([address] 08/03/2016    Hyperlipidemia 08/03/2016    Mitral regurgitation and mitral stenosis 10/29/2015    Aortic stenosis 08/31/2015    Weakness 10/02/2014    DVT (deep venous thrombosis) ([facility]) 06/13/2014    Anemia 05/20/2014    Other and unspecified angina pectoris 04/30/2014    Coronary atherosclerosis of native coronary artery 04/30/2014    Spinal stenosis of lumbosacral region 10/08/2013    Lupus (systemic lupus erythematosus) ([address] 08/23/2013    Sjogren's syndrome ([address] 08/23/2013    High risk medication use 08/23/2013    Pancytopenia ([address] 08/23/2013    Lumbosacral spinal stenosis 08/23/2013    Memory loss 07/15/2013    Ataxic gait 07/15/2013    Polyneuropathy ([address] 07/15/2013    Recurrent falls 07/15/2013    Chronic diastolic heart failure (HCC)     Chest pain, unspecified     Essential hypertension, benign     Edema     Chronic kidney disease, unspecified     Lupus (HCC)     Chronic venous embolism and thrombosis of deep vessels of lower extremity (HCC)     Rheumatoid arthritis (HCC)     Paroxysmal atrial fibrillation (HCC)     SOB (shortness of breath)     Chronic kidney disease     Arthritis     Hypertension     Autoimmune disease ([address]         The  provided the following Interventions:  Initiated a relationship of care and support. Explored issues of poonam, belief, spirituality and Christianity/ritual needs while hospitalized.   Listened empathically. Provided chaplaincy education. Provided information about Spiritual Care Services. Offered prayer and assurance of continued prayers on patient's behalf. Chart reviewed. The following outcomes where achieved:  Patient shared limited information about both their medical narrative and spiritual journey/beliefs.  confirmed Patient's Christian Affiliation. Patient processed feeling about current hospitalization. Patient expressed gratitude for 's visit. Assessment:  Patient does not have any Restorationist/cultural needs that will affect patients preferences in health care. There are no spiritual or Restorationist issues which require intervention at this time. Plan:  Chaplains will continue to follow and will provide pastoral care on an as needed/requested basis.  recommends bedside caregivers page  on duty if patient shows signs of acute spiritual or emotional distress.     [facility]  [phone]

## 2017-09-10 NOTE — ROUTINE PROCESS
Received patient lying in bed awake alert and orient x 4. Respiration even and unlabored. Skin warm and dry. Patient voice she is feeling more alert and rested today. patiert complain of soreness to left hip from a fall at home. No apparent distress noted. Call light in reach. Family members in room. Reinforced to call for assistance before getting out of bed and to rise slowly.

## 2017-09-10 NOTE — PROGRESS NOTES
conducted an initial consultation and Spiritual Assessment for Anna Fernando, who is a 73 y.o.,female. Patients Primary Language is: [place]. According to the patients EMR Quaker Affiliation is: Synagogue. The reason the Patient came to the hospital is:   Patient Active Problem List    Diagnosis Date Noted    GI bleed 09/09/2017    Generalized weakness 09/09/2017    Acute febrile illness 09/09/2017    Sepsis ([address] 09/09/2017    Acute on chronic diastolic congestive heart failure ([address] 08/03/2017    Pulmonary HTN ([address] 08/03/2016    Hyperlipidemia 08/03/2016    Mitral regurgitation and mitral stenosis 10/29/2015    Aortic stenosis 08/31/2015    Weakness 10/02/2014    DVT [facility]deep venous thrombosis) ([facility]) 06/13/2014    Anemia 05/20/2014    Other and unspecified angina pectoris 04/30/2014    Coronary atherosclerosis of native coronary artery 04/30/2014    Spinal stenosis of lumbosacral region 10/08/2013    Lupus (systemic lupus erythematosus) ([address] 08/23/2013    Sjogren's syndrome ([address] 08/23/2013    High risk medication use 08/23/2013    Pancytopenia ([address] 08/23/2013    Lumbosacral spinal stenosis 08/23/2013    Memory loss 07/15/2013    Ataxic gait 07/15/2013    Polyneuropathy ([address] 07/15/2013    Recurrent falls 07/15/2013    Chronic diastolic heart failure (HCC)     Chest pain, unspecified     Essential hypertension, benign     Edema     Chronic kidney disease, unspecified     Lupus (HCC)     Chronic venous embolism and thrombosis of deep vessels of lower extremity (HCC)     Rheumatoid arthritis (HCC)     Paroxysmal atrial fibrillation (HCC)     SOB (shortness of breath)     Chronic kidney disease     Arthritis     Hypertension     Autoimmune disease ([address]         The  provided the following Interventions:  Initiated a relationship of care and support. Explored issues of poonam, belief, spirituality and Spiritism/ritual needs while hospitalized.   Listened empathically. Provided chaplaincy education. Provided information about Spiritual Care Services. Offered prayer and assurance of continued prayers on patient's behalf. Chart reviewed. The following outcomes where achieved:  Patient shared limited information about both their medical narrative and spiritual journey/beliefs.  confirmed Patient's Quaker Affiliation. Patient processed feeling about current hospitalization. Patient expressed gratitude for 's visit. Assessment:  Patient does not have any Yazidism/cultural needs that will affect patients preferences in health care. There are no spiritual or Yazidism issues which require intervention at this time. Plan:  Chaplains will continue to follow and will provide pastoral care on an as needed/requested basis.  recommends bedside caregivers page  on duty if patient shows signs of acute spiritual or emotional distress.     [facility]  [phone]

## 2017-09-10 NOTE — PROGRESS NOTES
Problem: Falls - Risk of  Goal: *Absence of Falls  Document Issa Fall Risk and appropriate interventions in the flowsheet.    Outcome: Progressing Towards Goal  Fall Risk Interventions:  Mobility Interventions: Bed/chair exit alarm           Medication Interventions: Bed/chair exit alarm     Elimination Interventions: Call light in reach     History of Falls Interventions: Evaluate medications/consider consulting pharmacy

## 2017-09-11 NOTE — PROGRESS NOTES
[facility]      CARDIOLOGY PROGRESS NOTE  RECS:      1. Chest pain/ palpitations- likely angina in a patient with multiple risk factors. So far with negative troponin and EKG w/u acute ischemic changes. Patient with hx of Abnormal NUC stress test in the past. Will continue monitoring continue with bb and statin. Cardiac w/u when stable. 2. Paroxymal atrial fibrillation- currently NSR. Continue with Propafenone     3. CAD- Hx 4/14 abnormal stress test with lat ischemia; med Rx  4. Acute on chronic diastolic heart failure- mildly decompensated. Will use lasix prn for now. 5. AS and MS- moderate on recent echo   6. Hx Pulmonary hypertension  7. GI bleed- ok to hold coumadin. 8. Anemia- severe s/p transfusion 09/08/17 monitor H&H closely   9. Possible sepsis- unclear source possible UTI vs GI- w/u and management per medical team   10. SHRAVAN- creatinine slowly improving     Patient with stable angina- but has SHRAVAN and anemia. Continue medical management. Will f/u      SUMMARY: Echo 8/17  Left ventricle: Systolic function was hyperdynamic by visual assessment. Ejection fraction was estimated in the range of 70 % to 75 %. There were  no regional wall motion abnormalities. There was mild concentric  hypertrophy. There was dynamic obstruction at rest in the mid cavity  during systole and in diastole, with systolic mid-cavity obliteration. Doppler parameters were consistent with restrictive physiology, indicative  of decreased left ventricular diastolic compliance and/or increased left  atrial pressure. Right ventricle: Systolic function was reduced. Left atrium: The atrium was severely dilated. Mitral valve: There was marked annular calcification. There was  moderate-marked thickening. There was moderately reduced leaflet  separation. The findings were consistent with moderate mitral stenosis. There was mild regurgitation. Mean transmitral gradient was 5.66 mmHg. Aortic valve:  The valve was probably trileaflet. Leaflets exhibited  moderately to markedly increased thickness and mildly reduced cuspal  separation. Transaortic velocity was increased due to valvular stenosis. There was moderate stenosis. Valve mean gradient was 24.45 mmHg. Tricuspid valve: There was moderate regurgitation. Pulmonary artery  systolic pressure: 36 mmHg. Pulmonic valve: There was mild regurgitation. ASSESSMENT:  Hospital Problems  Date Reviewed: 8/3/2017          Codes Class Noted POA    GI bleed ICD-10-CM: K92.2  ICD-9-CM: 578.9  9/9/2017 Unknown        Generalized weakness ICD-10-CM: R53.1  ICD-9-CM: 780.79  9/9/2017 Unknown        Acute febrile illness ICD-10-CM: R50.9  ICD-9-CM: 780.60  9/9/2017 Unknown        Sepsis ([facility] ICD-10-CM: A41.9  ICD-9-CM: 038.9, 995.91  9/9/2017 Unknown        Anemia ICD-10-CM: D64.9  ICD-9-CM: 285.9  5/20/2014 Yes        Chronic diastolic heart failure ([facility] ICD-10-CM: I50.32  ICD-9-CM: 428.32  Unknown Yes    Overview Addendum 2/3/2017 11:44 AM by Mike Toussaint MD     2/17; 11/16; 8/16 [id number];   Stable symptoms;  11/16 much better chr edema             Chest pain, unspecified ICD-10-CM: R07.9  ICD-9-CM: 786.50  Unknown Yes    Overview Addendum 4/26/2013 12:16 PM by Mike Toussaint MD     Likely musculoskeletal/pleurotoc; will Rx medically now and consider cath in future if needed             Chronic kidney disease, unspecified ICD-10-CM: N18.9  ICD-9-CM: 461. 9  Unknown Yes        Paroxysmal atrial fibrillation ([facility] ICD-10-CM: I48.0  ICD-9-CM: 427.31  Unknown Yes    Overview Addendum 2/3/2017 11:43 AM by Mike Toussaint MD     2/17 occ palp but SR on EKG; 11/16 SR on rythmol  pt in SR since 3/13 atleast  5/14 amio d/thomas due to hyperthyroid; 4/15 nl TFT; 5/15; 10/15 SR on rhythmol  chr warfarin due to recurrent DVT; f/u INR PCP                     SUBJECTIVE:    CP this am with palpitations   C/o SOB no improvement     OBJECTIVE:    VS:   Visit Vitals    /81 (BP 1 Location: Left arm, BP Patient Position: At rest)    Pulse 86    Temp 97.9 °F (36.6 °C)    Resp 18    Ht 5' 6\" (1.676 m)    Wt 79.4 kg (175 lb)    SpO2 98%    Breastfeeding No    BMI 28.25 kg/m2         Intake/Output Summary (Last 24 hours) at 09/11/17 1239  Last data filed at 09/10/17 1842   Gross per 24 hour   Intake           1737.5 ml   Output              400 ml   Net           1337.5 ml     TELE: normal sinus rhythm    General: alert, well developed, dyspneic and pleasant  HENT: Normocephalic, atraumatic. Normal external eye. Neck :  JVD difficult to assess due to obesity  Cardiac:  regular rate and rhythm, S1, S2 normal, no S3 or S4, no click, no rub. High-pitched harsh midsystolic murmur is present with a grade of 3/6  at the apex radiating to the neck  Pulmonary/Chest:   Lungs: rales both lung bases.   Abdomen: Soft, nontender, no masses  Extremities:  No c/c/e, peripheral pulses present      Labs: Results:       Chemistry Recent Labs      09/11/17   0412  09/10/17   0333  09/09/17   0630  09/08/17   2138   GLU  87  85  117*  137*   NA  143  142  143  139   K  3.6  3.6  3.6  3.7   CL  115*  112*  112*  108   CO2  20*  21  21  24   BUN  21*  29*  37*  35*   CREA  1.83*  2.07*  2.92*  2.71*   CA  7.9*  7.3*  7.6*  8.1*   AGAP  8  9  10  7   BUCR  11*  14  13  13   AP   --    --   84  87   TP   --    --   5.8*  6.8   ALB   --    --   2.1*  2.2*   GLOB   --    --   3.7  4.6*   AGRAT   --    --   0.6*  0.5*      CBC w/Diff Recent Labs      09/10/17   0333  09/09/17   0935  09/09/17   0630  09/08/17   2138   WBC  6.2   --    --   8.6   RBC  3.51*   --    --   3.24*   HGB  8.5*  7.0*  7.1*  7.6*   HCT  28.8*  25.1*  24.6*  26.2*   PLT  223   --    --   274   GRANS  65   --    --   74*   LYMPH  24   --    --   18*   EOS  0   --    --   0      Cardiac Enzymes Recent Labs      09/09/17   1800  09/08/17   2138   CPK  146  58   CKND1  1.0  CALCULATION NOT PERFORMED WHEN RESULT IS BELOW LINEAR LIMIT Coagulation Recent Labs      09/10/17   0333  09/09/17   1800  09/08/17   2138   PTP  26.5*  28.8*  23.9*   INR  2.5*  2.8*  2.2*   APTT   --    --   51.9*       Lipid Panel Lab Results   Component Value Date/Time    Cholesterol, total 94 09/10/2017 03:33 AM    HDL Cholesterol 41 09/10/2017 03:33 AM    LDL, calculated 36.4 09/10/2017 03:33 AM    VLDL, calculated 16.6 09/10/2017 03:33 AM    Triglyceride 83 09/10/2017 03:33 AM    CHOL/HDL Ratio 2.3 09/10/2017 03:33 AM      BNP No results for input(s): BNPP in the last 72 hours. Liver Enzymes Recent Labs      09/09/17   0630   TP  5.8*   ALB  2.1*   AP  84   SGOT  24      Thyroid Studies Lab Results   Component Value Date/Time    TSH 1.77 09/08/2017 09:38 PM              Liliana Beltran Chloe:[phone] supervised. I have independently evaluated and examined the patient. All relevant labs and testing data's are reviewed. Care plan discussed and updated after review.     Kenn Lake MD

## 2017-09-11 NOTE — INTERDISCIPLINARY ROUNDS
IDR/SLIDR Summary          Patient: Zena Rivers MRN: [id number]    Age: 73 y.o. YOB: 1945 Room/Bed: [facility]/   Admit Diagnosis: GI bleed  Acute febrile illness  Generalized weakness  Sepsis ([facility]  Principal Diagnosis: <principal problem not specified>   Goals: Resolve GI Bleed/Anemia  Readmission: NO  Quality Measure: Not applicable  VTE Prophylaxis: Mechanical  Influenza Vaccine screening completed? YES  Pneumococcal Vaccine screening completed? YES  Mobility needs: Yes   Nutrition plan:No  Consults:Case Management    Financial concerns:No  Escalated to CM? NO  RRAT Score: 21   Interventions:  Testing due for pt today? NO  LOS: 2 days Expected length of stay 5 days  Discharge plan: Home with Brother   PCP: Suzy Goldmann, MD  Transportation needs: Yes    Days before discharge:two or more days before discharge   Discharge disposition: Home    Signed:      Power Howell RN  9/11/2017  2:23 AM

## 2017-09-11 NOTE — PROGRESS NOTES
NUTRITION    Nursing Referral: [facility]      RECOMMENDATIONS / PLAN:     - No nutrition intervention indicated at this time. Will re-screen as appropriate. NUTRITION INTERVENTIONS & DIAGNOSIS:     Nutrition Diagnosis:  No nutrition diagnosis at this time    ASSESSMENT:     Pt with fair/good meal intake per chart and nursing report. Tolerating diet.      Average po intake adequate to meet patients estimated nutritional needs:   [] Yes     [] No   [x] Unable to determine at this time    Diet: DIET CARDIAC Regular      Food Allergies:  None known (latex)  Current Appetite:   [] Good     [] Fair     [] Poor     [x] Other:  Unknown   Appetite/meal intake prior to admission:   [] Good     [] Fair     [] Poor     [x] Other: unknown   Feeding Limitations:  [] Swallowing difficulty    [] Chewing difficulty    [] Other:  Current Meal Intake: Patient Vitals for the past 100 hrs:   % Diet Eaten   09/11/17 1257 60 %   09/10/17 1842 75 %   09/10/17 1222 50 %   09/10/17 0905 75 %       BM:  9/11  Skin Integrity:  No pressure ulcer or wound noted  Edema:  None   Pertinent Medications: Reviewed    Recent Labs      09/11/17   0412  09/10/17   0333  09/09/17   0630  09/08/17   2138   NA  143  142  143  139   K  3.6  3.6  3.6  3.7   CL  115*  112*  112*  108   CO2  20*  21  21  24   GLU  87  85  117*  137*   BUN  21*  29*  37*  35*   CREA  1.83*  2.07*  2.92*  2.71*   CA  7.9*  7.3*  7.6*  8.1*   MG   --    --    --   2.3   ALB   --    --   2.1*  2.2*   SGOT   --    --   24  31   ALT   --    --   20  22       Intake/Output Summary (Last 24 hours) at 09/11/17 1544  Last data filed at 09/11/17 1257   Gross per 24 hour   Intake              390 ml   Output              200 ml   Net              190 ml       Anthropometrics:  Ht Readings from Last 1 Encounters:   09/08/17 5' 6\" (1.676 m)     Last 3 Recorded Weights in this Encounter    09/08/17 2122 09/11/17 1035   Weight: 77.1 kg (170 lb) 79.4 kg (175 lb)     Body mass index is 28.25 kg/(m^2). Weight History:  Noted wt gain PTA per chart hx    Weight Metrics 9/11/2017 8/3/2017 2/3/2017 11/7/2016 11/3/2016 8/15/2016 8/3/2016   Weight 175 lb 173 lb 156 lb 158 lb 158 lb 162 lb 158 lb   BMI 28.25 kg/m2 27.92 kg/m2 25.18 kg/m2 25.5 kg/m2 25.5 kg/m2 26.15 kg/m2 25.51 kg/m2        Admitting Diagnosis: GI bleed  Acute febrile illness  Generalized weakness  Sepsis (HCC)  Pertinent PMHx: CAD, chronic diastolic heart failure, CKD, HTN, HLD    Education Needs:        [x] None identified  [] Identified - Not appropriate at this time  []  Identified and addressed - refer to education log  Learning Limitations:   [x] None identified  [] Identified    Cultural, Rastafarian & ethnic food preferences:  [x] None identified    [] Identified and addressed     ESTIMATED NUTRITION NEEDS:     Calories: 3710-2408 kcal (30-33 kcal/kg) based on  [] Actual BW      [x] SBW: 66 kg   Protein: 40-53 gm (0.6-0.8 gm/kg) based on  [] Actual BW      [x] SBW   Fluid: 1 mL/kcal     MONITORING & EVALUATION:     Nutrition Goal(s):   1. Po intake of meals will meet >75% of patient estimated nutritional needs within the next 7 days.   Outcome:  [] Met/Ongoing    []  Not Met    [x] New/Initial Goal     Monitoring:   [x] Diet tolerance   [x] Meal intake   [] Supplement intake   [] GI symptoms/ability to tolerate po diet   [] Respiratory status   [] Plan of care      Previous Recommendations (for follow-up assessments only):     []   Implemented       []   Not Implemented (RD to address)     [] No Recommendation Made     Discharge Planning:  Cardiac diet   [x] Participated in care planning, discharge planning, & interdisciplinary rounds as appropriate      Mao Olmstead, [address]   Pager: [phone]

## 2017-09-11 NOTE — PROGRESS NOTES
[facility] Hospitalist Group  Progress Note    Patient: Ana Bolanos Age: 73 y.o. : 1945 MR#: [id number] SSN: [id number]  Date: 9/10/2017     Subjective:   Pt c/o sob which is new. Assessment and Plan:   73year old female who presented on  with cc of weakness and fever.    -C.diff colitis- pt c/o multiple episodes of diarrhea for several months. Has had recent abx to treat UTI. Will dc cipro, cont flagyl.  -Anemia-chronic. Heme + brown stool per ED physician report in the setting of C.diff colitis. S/p GI eval with recommendations of no endoscopy at this time. Pt of note on coumadin for DVT/afib  with therapeutic INR. Hgb higher than yesterday today. Will hold coumadin until stability of Hgb continues to be established.  -Recent UTI-s/p abx. -SHRAVAN improving cont to monitor, renally dose meds, avoid nephrotoxic drugs.  -History of Afib on coumadimn propafenone INR=2.5 today. Will hold coumadin until hgb stability continues to be established.  -Complaints of SOB likely multifactorial. CXR checked today unrevealing. ?diastolic HF, ?secondary to anemia. Doubt PE given theraputic INR. Will give dose of lasix and monitor.  -Complaints of chest pain s/p cards eval. Trop negative on bb statin, echo pending. Appreciate input. Consider repeat nuclear stress test. Will await for further decision from consultant.                        ABIMAEL marksitional Notes:      Case discussed with:  [x]Patient  []Family  [x]Nursing  []Case Management  DVT Prophylaxis:  []Lovenox  []Hep SQ  []SCDs  []Coumadin   []On Heparin gtt    Objective:   VS:   Visit Vitals    /63 (BP 1 Location: Left arm, BP Patient Position: At rest)    Pulse 81    Temp 98 °F (36.7 °C)    Resp 20    Ht 5' 6\" (1.676 m)    Wt 77.1 kg (170 lb)    SpO2 96%    Breastfeeding No    BMI 27.44 kg/m2      Tmax/24hrs: Temp (24hrs), Av.8 °F (37.1 °C), Min:98 °F (36.7 °C), Max:99.5 °F (37.5 °C)    Intake/Output Summary (Last 24 hours) at 09/10/17 2228  Last data filed at 09/10/17 1842   Gross per 24 hour   Intake           2097.5 ml   Output              900 ml   Net           1197.5 ml       General:  Alert, awake, in NAD  Cardiovascular:  Rrr, no murmurs  Pulmonary:  ctab  GI:  Soft, nt, nd  Extremities:  No edema  Additional:      Labs:    Recent Results (from the past 24 hour(s))   METABOLIC PANEL, BASIC    Collection Time: 09/10/17  3:33 AM   Result Value Ref Range    Sodium 142 136 - 145 mmol/L    Potassium 3.6 3.5 - 5.5 mmol/L    Chloride 112 (H) 100 - 108 mmol/L    CO2 21 21 - 32 mmol/L    Anion gap 9 3.0 - 18 mmol/L    Glucose 85 74 - 99 mg/dL    BUN 29 (H) 7.0 - 18 MG/DL    Creatinine 2.07 (H) 0.6 - 1.3 MG/DL    BUN/Creatinine ratio 14 12 - 20      GFR est AA 29 (L) >60 ml/min/1.73m2    GFR est non-AA 24 (L) >60 ml/min/1.73m2    Calcium 7.3 (L) 8.5 - 10.1 MG/DL   PROTHROMBIN TIME + INR    Collection Time: 09/10/17  3:33 AM   Result Value Ref Range    Prothrombin time 26.5 (H) 11.5 - 15.2 sec    INR 2.5 (H) 0.8 - 1.2     CBC WITH AUTOMATED DIFF    Collection Time: 09/10/17  3:33 AM   Result Value Ref Range    WBC 6.2 4.6 - 13.2 K/uL    RBC 3.51 (L) 4.20 - 5.30 M/uL    HGB 8.5 (L) 12.0 - 16.0 g/dL    HCT 28.8 (L) 35.0 - 45.0 %    MCV 82.1 74.0 - 97.0 FL    MCH 24.2 24.0 - 34.0 PG    MCHC 29.5 (L) 31.0 - 37.0 g/dL    RDW 18.3 (H) 11.6 - 14.5 %    PLATELET 298 223 - 360 K/uL    MPV 10.0 9.2 - 11.8 FL    NEUTROPHILS 65 40 - 73 %    LYMPHOCYTES 24 21 - 52 %    MONOCYTES 11 (H) 3 - 10 %    EOSINOPHILS 0 0 - 5 %    BASOPHILS 0 0 - 2 %    ABS. NEUTROPHILS 4.0 1.8 - 8.0 K/UL    ABS. LYMPHOCYTES 1.5 0.9 - 3.6 K/UL    ABS. MONOCYTES 0.7 0.05 - 1.2 K/UL    ABS. EOSINOPHILS 0.0 0.0 - 0.4 K/UL    ABS.  BASOPHILS 0.0 0.0 - 0.1 K/UL    DF AUTOMATED     FERRITIN    Collection Time: 09/10/17  3:33 AM   Result Value Ref Range    Ferritin 104 8 - 388 NG/ML   VITAMIN B12 & FOLATE    Collection Time: 09/10/17  3:33 AM   Result Value Ref Range Vitamin B12 1724 (H) 211 - 911 pg/mL    Folate >20.0 (H) 3.10 - 17.50 ng/mL   LIPID PANEL    Collection Time: 09/10/17  3:33 AM   Result Value Ref Range    LIPID PROFILE          Cholesterol, total 94 <200 MG/DL    Triglyceride 83 <150 MG/DL    HDL Cholesterol 41 40 - 60 MG/DL    LDL, calculated 36.4 0 - 100 MG/DL    VLDL, calculated 16.6 MG/DL    CHOL/HDL Ratio 2.3 0 - 5.0     VANCOMYCIN, RANDOM    Collection Time: 09/10/17  9:07 AM   Result Value Ref Range    Vancomycin, random 12.9 5.0 - 40.0 UG/ML   C. DIFFICILE/EPI PCR    Collection Time: 09/10/17 10:00 AM   Result Value Ref Range    Special Requests: NO SPECIAL REQUESTS      Culture result: (A)       Toxigenic C. difficile POSITIVE. Toxin producing C. difficile target sequences are detected.     Culture result:        CALLED TO AND CORRECTLY REPEATED BY:  ANUEL GRANDA 4N AT [id number] TO BKS         Signed By: Magdaleno Livingston MD     September 10, 2017 10:28 PM

## 2017-09-11 NOTE — PROGRESS NOTES
Problem: Falls - Risk of  Goal: *Absence of Falls  Document Issa Fall Risk and appropriate interventions in the flowsheet.    Outcome: Progressing Towards Goal  Fall Risk Interventions:  Mobility Interventions: Bed/chair exit alarm           Medication Interventions: Bed/chair exit alarm, Patient to call before getting OOB     Elimination Interventions: Call light in reach, Bed/chair exit alarm, Patient to call for help with toileting needs     History of Falls Interventions: Door open when patient unattended, Bed/chair exit alarm

## 2017-09-11 NOTE — ROUTINE PROCESS
Bedside and Verbal shift change report given to Tyron Hancock RN (oncoming nurse) by Edgardo Manuel RN (offgoing nurse). Report included the following information SBAR, Kardex, MAR and Recent Results. SITUATION:    Code Status: Full Code   Reason for Admission: GI bleed   Acute febrile illness   Generalized weakness   Sepsis ([facility] [address]    [facility] day: 1   Problem List:       Hospital Problems  Date Reviewed: 8/3/2017          Codes Class Noted POA    GI bleed ICD-10-CM: K92.2  ICD-9-CM: 578.9  9/9/2017 Unknown        Generalized weakness ICD-10-CM: R53.1  ICD-9-CM: 780.79  9/9/2017 Unknown        Acute febrile illness ICD-10-CM: R50.9  ICD-9-CM: 780.60  9/9/2017 Unknown        Sepsis ([facility] [address] ICD-10-CM: A41.9  ICD-9-CM: 038.9, 995.91  9/9/2017 Unknown        Anemia ICD-10-CM: D64.9  ICD-9-CM: 285.9  5/20/2014 Yes        Chronic diastolic heart failure ([facility] [address] ICD-10-CM: I50.32  ICD-9-CM: 428.32  Unknown Yes    Overview Addendum 2/3/2017 11:44 AM by Darryle Pavlov, MD     2/17; 11/16; 8/16 HNFT1;   Stable symptoms;  11/16 much better chr edema             Chest pain, unspecified ICD-10-CM: R07.9  ICD-9-CM: 786.50  Unknown Yes    Overview Addendum 4/26/2013 12:16 PM by Darryle Pavlov, MD     Likely musculoskeletal/pleurotoc; will Rx medically now and consider cath in future if needed             Chronic kidney disease, unspecified ICD-10-CM: N18.9  ICD-9-CM: 390. 9  Unknown Yes        Paroxysmal atrial fibrillation (HCC) ICD-10-CM: I48.0  ICD-9-CM: 427.31  Unknown Yes    Overview Addendum 2/3/2017 11:43 AM by Darryle Pavlov, MD     2/17 occ palp but SR on EKG; 11/16 SR on rythmol  pt in SR since 3/13 atleast  5/14 amio d/thomas due to hyperthyroid; 4/15 nl TFT; 5/15; 10/15 SR on rhythmol  chr warfarin due to recurrent DVT; f/u INR PCP                   BACKGROUND:    Past Medical History:   Past Medical History:   Diagnosis Date    [facility]     Aortic valve disorders 8/31/2015    7/15 mild AS     Arrhythmia a-fib     Arthritis     Autoimmune disease ([facility] [address]     lupus 2001  Reynauds disease  Sjogrens    CAD (coronary artery disease)     AFIB  2011 Dr Robson [place] Chest pain, unspecified     Poss atyp angina, will Rx medically now and consider cath in future if needed    Chronic diastolic heart failure (HCC)     Stable symptoms    Chronic kidney disease     Chronic kidney disease, unspecified     Chronic venous embolism and thrombosis of unspecified deep vessels of lower extremity     8/11    Congestive heart failure, unspecified     Edema     improving    Essential hypertension     Hyperlipidemia     Hypertension     Lupus ([facility] [address]     Mitral regurgitation and mitral stenosis 10/29/2015    7/15 mild     Mitral valve disorders 8/31/2015    7/15 mild MS/MR     Pancytopenia (HCC)     Paroxysmal atrial fibrillation (HCC)     Rheumatoid arthritis ([facility] [address]          Patient taking anticoagulants no     ASSESSMENT:    Changes in Assessment Throughout Shift: none     Patient has Central Line: no Reasons if yes:    Patient has Epperson Cath: no Reasons if yes:       Last Vitals:     Vitals:    09/10/17 0905 09/10/17 1222 09/10/17 1549 09/10/17 2020   BP: 155/90 145/65 124/78 128/63   Pulse: 90 79 78 81   Resp: 17 19 18 20   Temp: 99.1 °F (37.3 °C) 98.5 °F (36.9 °C) 99.5 °F (37.5 °C) 98 °F (36.7 °C)   SpO2: 100% 97% 93% 96%   Weight:       Height:            IV and DRAINS (will only show if present)   [REMOVED] Peripheral IV 09/09/17 Left Hand-Site Assessment: Clean, dry, & intact  [REMOVED] Peripheral IV 09/08/17 Left Antecubital-Site Assessment: Clean, dry, & intact  Peripheral IV 09/10/17 Right Forearm-Site Assessment: Clean, dry, & intact     WOUND (if present)   Wound Type:  none   Dressing present Dressing Present : No   Wound Concerns/Notes:  none     PAIN    Pain Assessment    Pain Intensity 1: 0 (09/10/17 1600)              Patient Stated Pain Goal: 0  o Interventions for Pain:  none  o Intervention effective:   o Time of last intervention:    o Reassessment Completed:       Last 3 Weights:  Last 3 Recorded Weights in this Encounter    09/08/17 2122   Weight: 77.1 kg (170 lb)     Weight change:      INTAKE/OUPUT    Current Shift:      Last three shifts: 09/09 0701 - 09/10 1900  In: 2097.5 [P.O.:600; I.V.:1497.5]  Out: 900 [Urine:800]     LAB RESULTS     Recent Labs      09/10/17   0333  09/09/17   0935  09/09/17   0630 09/08/17   2138   WBC  6.2   --    --   8.6   HGB  8.5*  7.0*  7.1*  7.6*   HCT  28.8*  25.1*  24.6*  26.2*   PLT  223   --    --   274        Recent Labs      09/10/17   0333  09/09/17   1800  09/09/17 0630 09/08/17 2138   NA  142   --   143  139   K  3.6   --   3.6  3.7   GLU  85   --   117*  137*   BUN  29*   --   37*  35*   CREA  2.07*   --   2.92*  2.71*   CA  7.3*   --   7.6*  8.1*   MG   --    --    --   2.3   INR  2.5*  2.8*   --   2.2*       RECOMMENDATIONS AND DISCHARGE PLANNING     1. Pending tests/procedures/ Plan of Care or Other Needs: Pending Echo;Possible repeat of Nuclear Stress Test     2. Discharge plan for patient and Needs/Barriers: TBD    3. Estimated Discharge Date: 9/13/17 Posted on Whiteboard in [facility] Room: yes      4. The patient's care plan was reviewed with the oncoming nurse. \"HEALS\" SAFETY CHECK      Fall Risk    Total Score: 4    Safety Measures: Safety Measures: Bed/Chair alarm on, Bed/Chair-Wheels locked, Bed in low position, Call light within reach, Side rails X 3    A safety check occurred in the patient's room between off going nurse and oncoming nurse listed above.     The safety check included the below items  Area Items   H  High Alert Medications - Verify all high alert medication drips (heparin, PCA, etc.)   E  Equipment - Suction is set up for ALL patients (with tiera)  - Red plugs utilized for all equipment (IV pumps, etc.)  - WOWs wiped down at end of shift.  - Room stocked with oxygen, suction, and other unit-specific supplies A  Alarms - Bed alarm is set for fall risk patients  - Ensure chair alarm is in place and activated if patient is up in a chair   L  Lines - Check IV for any infiltration  - Epperson bag is empty if patient has a Epperson   - Tubing and IV bags are labeled   S  Safety   - Room is clean, patient is clean, and equipment is clean. - Hallways are clear from equipment besides carts. - Fall bracelet on for fall risk patients  - Ensure room is clear and free of clutter  - Suction is set up for ALL patients (with yanker)  - Hallways are clear from equipment besides carts.    - Isolation precautions followed, supplies available outside room, sign posted     Hossein Callejas RN

## 2017-09-11 NOTE — CONSULTS
[facility]. Pulmonary, Critical Care, and Sleep Medicine    Initial Patient Consult    Name: Amelia Nj MRN: [id number]   : 1945 Hospital: [facility]   Date: 2017        IMPRESSION:   1. Mild to moderate chronic pulmonary fibrosis at bilateral lung bases, with demonstrated radiographic stability from 2198-9711, and stable moderate restriction on pulmonary function testing, likely due to collagen vascular disease. 2.  Mixed connective tissue disease (CTD), with Lupus erythematosis, Sjogren's and Raynaud's diseases, chronic, maintained on prednisone 10 mg daily and azathioprine 50 mg BID, no evidence of recent flare. 3.  Moderate AS and moderate MS with concentric LV hypertrophy, managed by Dr. Valentino Kitchen. 4.  Acute dyspneic episodes (and falls?) likely to have been precipitated by anemia +/- acute ischemia +/- rapid paroxysmal AFib, on top of limited reserve due to #1-3 and #5. 5.  Moderate pulmonary hypertension, which has improved from severe pulmonary hypertension (PA 57mmHg) in . Improvement likely due to treatment with calcium channel blocker, afterload reduction with hydralazine and management of CHF. Contributors include MS, AS and pulmonary vascular disease due to CTD. No prior R heart catheterization. 6.  No evidence of hypoxemia or sleep apnea at baseline. 7.  Probable mild CHF in setting of anemia (?\"high output CHF\"), with 17 lb weight gain since 2016.      Patient Active Problem List   Diagnosis Code    Chronic diastolic heart failure (HCC) I50.32    Chest pain, unspecified R07.9    Essential hypertension, benign I10    Edema R60.9    Chronic kidney disease, unspecified N18.9    Lupus ([facility] [address] M32.9    Chronic venous embolism and thrombosis of deep vessels of lower extremity (HCC) I82.509    Rheumatoid arthritis (HCC) M06.9    Paroxysmal atrial fibrillation (HCC) I48.0    SOB (shortness of breath) R06.02    Chronic kidney disease N18.9    Arthritis M19.90    Hypertension I10    Autoimmune disease ([facility] [address] M35.9    Memory loss R41.3    Ataxic gait R26.0    Polyneuropathy ([facility] [address] G62.9    Recurrent falls R29.6    Lupus (systemic lupus erythematosus) ([facility]) M32.9    Sjogren's syndrome ([facility]) M35.00    High risk medication use Z79.899    Pancytopenia ([facility]) D61.818    Lumbosacral spinal stenosis M48.07    Spinal stenosis of lumbosacral region M48.07    Other and unspecified angina pectoris I20.9    Coronary atherosclerosis of native coronary artery I25.10    Anemia D64.9    DVT (deep venous thrombosis) ([facility]) I82.409    Weakness R53.1    Aortic stenosis I35.0    Mitral regurgitation and mitral stenosis I05.2    Pulmonary HTN ([facility]) I27.2    Hyperlipidemia E78.5    Acute on chronic diastolic congestive heart failure ([facility]) I50.33    GI bleed K92.2    Generalized weakness R53.1    Acute febrile illness R50.9    Sepsis ([facility]) A41.9      RECOMMENDATIONS:     · Overall recommendation is to restore her fragile equilibrium of many chronic diseases:  · Treat CHF and anemia: Agree with transfusing to Hb 8, use lasix to decrease weight by about 10 lbs. ·  Repeat CXR after 5+ lbs diuresis  · Maintain SpO2 >92% at all times. · Continue oral steroids at maintenance doses, only add stress dose steroids if indicated (infection, surgery, shock, etc.). High dose steroids are not recommended at this time due to stable fibrosis pattern. Patient is not having symptoms of a flare of CVD.   · Aspiration precautions, repeat modified barium swallowing study (MBS) this admission, if evidence of distal esophageal stricture, consult GI for consideration of EGD/dilation  · Modify diet if indicated by MBS results  · Consider colonoscopy to r/o colon cancer (i.e., would not assume c diff is sole source of anemia although it might be)  · Treat C diff with po vanco flagyl, 2 week course minimum  · BNP, ESR  · Out patient pulmonary follow up and testing- PFT  · Assess home Oxygen needs at discharge  · OT, PT, with focus on fall prevention; cautious supervised OOB and ambulate. I don't think her pulmonary hypertension or valvular heart disease alone is the cause of her falls; more likely a combination of all the above especially anemia. · Healthy weight   · Will Follow     Subjective: This patient has been seen and evaluated at the request of Dr. Gonsalo Lloyd for dyspnea. Patient is a 73 y.o. female who was a difficult historian due to memory difficulties. The history is from the patient and the chart. She was admitted to the hospital with 6 falls over the past 1.5 weeks, with her last fall prior to that 1 year ago. She denied serious injury. She thought she tripped, they occurred in various rooms in her house, and denies dizziness, chest pain, LOC, seizure, n,v, or knee/hip/foot instability. She was found to have severe anemia, but denied any rogerio blood loss, including hematemesis, hematochezia, hematuria, or vaginal bleeding. She has had C diff colitis, with >5 BM per day, watery, and a likely source of blood loss. Her dyspnea has been intermittent, and she awake suddenly at 8:30AM with dyspnea yesterday. She stated she was in the ER 3 days last week for the same complaint. Notably, she has a recently positive nuclear stress test, and is being followed by cardiology. Her cardiac anatomy is remarkable for moderate AS, moderate MS, normal EF, and concentric LV wall thickening/restriction on recent echo. She has moderate pulmonary hypertension (PA 36mmHg), and paroxysmal atrial fibrillation. PA pressure in July 2016 was 45mmHg. And in July 2015 was 57mmHg. In this setting, ischemia, rapid AF and/or anemia could precipitate CHF on top of her valvular and restrictive heart disease. Her CXR is stably congested. The episodic nature of her dyspnea favors either ischemia or rapid AF.     She has also complained of choking on her food, and stated she lost 50 lbs in the past 6 months due to not wanting to choke. Notably, documented weights show her at 158 lbs in November 2016 and 175 lbs now. Aspiration is in the ddx. She has seen ENT and Dr. Aly Bar of GI in the past for reflux and dysphagia, records not available at this time. She has a mild cough productive of intermittent white/yellow scant sputum, wheezes daily. Does not take inhalers. She was diagnosed with Lupus in 2001, and stated she has been on prednisone 5 mg BID and azathioprine 50 mg BID continuously since then, follows with Dr. La Nena Bellamy. She denied a recent major flare of this. She stated that she has been told her lungs were involved with lupus, could see Dr. Surya Seymour of pulmonary for known pulmonary fibrosis, and complained of dyspnea on exertion since before his last visit 11/2016. She has not had a bronchoscopy or lung biopsy in the past. She has never been on home O2. Did recall a prior PFT. She denied pleurisy, has arthritis in hands, feet, and stated she gets a skin rash which was not evident today. Notably, her review of systems was positive in every system, and she cried out while the phlebotomist put on a tourniquet for blood draw. She also has Sjogren's and did not have any details of her diagnosis or symptoms. Denied having to use eyedrops or mouth dryness. No special diet.     Past Medical History:   Diagnosis Date    A-fib [facility])     Aortic valve disorders 8/31/2015    7/15 mild AS     Arrhythmia     a-fib     Arthritis     Autoimmune disease ([facility] [address]     lupus 2001  Reynauds disease  Sjogrens    CAD (coronary artery disease)     AFIB  2011 Dr Cammie Nuñez Chest pain, unspecified     Poss atyp angina, will Rx medically now and consider cath in future if needed    Chronic diastolic heart failure (HCC)     Stable symptoms    Chronic kidney disease     Chronic kidney disease, unspecified     Chronic venous embolism and thrombosis of unspecified deep vessels of lower extremity     8/11    Congestive heart failure, unspecified     Edema     improving    Essential hypertension     Hyperlipidemia     Hypertension     Lupus (HCC)     Mitral regurgitation and mitral stenosis 10/29/2015    7/15 mild     Mitral valve disorders 8/31/2015    7/15 mild MS/MR     Pancytopenia (HCC)     Paroxysmal atrial fibrillation (HCC)     Rheumatoid arthritis (HCC)      Past Surgical History:   Procedure Laterality Date    ABDOMEN SURGERY PROC UNLISTED      gall bladder    BREAST SURGERY PROCEDURE UNLISTED      reduction    ENDOSCOPY, COLON, DIAGNOSTIC      FULL ESOPHAGEAL MANOMETRY  7/4/2016         HX CHOLECYSTECTOMY      HX GYN      hyst    HX OTHER SURGICAL      lymph nodes removed and eyelids raised      Prior to Admission medications    Medication Sig Start Date End Date Taking? Authorizing Provider   metoprolol tartrate (LOPRESSOR) 100 mg IR tablet Take 1 Tab by mouth two (2) times a day. 8/24/17   Narinder Sainz MD   loratadine (CLARITIN) 10 mg tablet Take 10 mg by mouth. Historical Provider   hydrALAZINE (APRESOLINE) 10 mg tablet Take 1 Tab by mouth three (3) times daily. 2/27/17   Liliana Beltran NP   rosuvastatin (CRESTOR) 40 mg tablet Take 1 Tab by mouth nightly. 2/27/17   Liliana Beltran NP   propafenone (RYTHMOL) 225 mg tablet Take 1 Tab by mouth three (3) times daily. 2/1/17   Liliana Beltran NP   triamcinolone acetonide (KENALOG) 0.1 % ointment Apply  to affected area two (2) times a day. use thin layer   Indications: pt stopped    Historical Provider   furosemide (LASIX) 40 mg tablet Take 1 Tab by mouth three (3) times daily. Take 2 tabs(80mg) in am and 1 tab 40 mg  at 2pm. May skip PM lasix if no edema/SOB 11/7/16   Narinder Sainz MD   acetaminophen (TYLENOL) 325 mg tablet Take 650 mg by mouth every four (4) hours as needed for Pain.     Historical Provider   clotrimazole-betamethasone (LOTRISONE) topical cream Apply  to affected area two (2) times a day. Indications: pt stopped    Historical Provider   amLODIPine (NORVASC) 5 mg tablet Take 1 Tab by mouth daily. Patient taking differently: Take 10 mg by mouth daily. 10/29/15   Isela Anaya MD   Cholecalciferol, Vitamin D3, 1,000 unit cap Take 2,000 Units by mouth daily. Historical Provider   loperamide (IMODIUM) 2 mg capsule Take  by mouth as needed. Historical Provider   traMADol (ULTRAM) 50 mg tablet Take 50 mg by mouth every six (6) hours as needed for Pain. Historical Provider   gabapentin (NEURONTIN) 100 mg capsule Take 200 mg by mouth nightly. Historical Provider   sertraline (ZOLOFT) 50 mg tablet Take 100 mg by mouth daily. Historical Provider   isosorbide mononitrate ER (IMDUR) 60 mg CR tablet Take 60 mg by mouth every morning. Historical Provider   azaTHIOprine (IMURAN) 50 mg tablet Take 50 mg by mouth two (2) times a day. Indications: switched md    Historical Provider   warfarin (COUMADIN) 1 mg tablet Take 1 Tab by mouth daily. 4/30/14   Isela Anaya MD   predniSONE (DELTASONE) 5 mg tablet Take 5 mg by mouth two (2) times a day. Historical Provider   famotidine (PEPCID) 40 mg tablet Take 1 Tab by mouth two (2) times a day. Patient taking differently: Take 40 mg by mouth daily. 2 am 1 pm 8/11/13   Jeremy Redd MD     Allergies   Allergen Reactions    Latex Itching    Adhesive Other (comments)     Blisters skin    Amoxicillin Hives    Bactrim [Sulfamethoprim] Nausea Only      Social History   Substance Use Topics    Smoking status: Never Smoker    Smokeless tobacco: Never Used    Alcohol use No      Comment: ocassional    Patient is ,  x 3 years, lives with her brother, both brother and sister live locally, she moved from [facility] 5 years ago to live with her brother, who does her caregiving. No children, never smoked, no EtOH, no marijuana narcotic or other drug use. Collects (20) stuffed owls.   Family History   Problem Relation Age of Onset    Colon Polyps Brother     Heart Disease Neg Hx      Negative history of premature CAD or CVA    Heart Attack Neg Hx     Sudden Death Neg Hx     Stroke Neg Hx         Current Facility-Administered Medications   Medication Dose Route Frequency    propafenone (RYTHMOL) tablet 225 mg  225 mg Oral TID    rosuvastatin (CRESTOR) tablet 40 mg  40 mg Oral QHS    sertraline (ZOLOFT) tablet 100 mg  100 mg Oral DAILY    metroNIDAZOLE (FLAGYL) tablet 500 mg  500 mg Oral TID    metoprolol tartrate (LOPRESSOR) tablet 50 mg  50 mg Oral BID    loratadine (CLARITIN) tablet 10 mg  10 mg Oral DAILY       Review of Systems:  Pertinent items are noted in HPI. ROS   Her weight in 2016 was 158 lbs, and now is 175 lbs. ENT: swallowing problems  Neuro: no cva, seizure. Stated she had \"2 mini-strokes\" Has memory difficulty and diffuse weakness  Pulm: pneuminia 5 years ago, no asthma or COPD. No sleep apnea. GYN: breast reduction, hysterectomy after tubal pregnancy in 's, no vaginal bleeding. Skin: recently had skin cancer removed from back  Onc: denied prior cancer except as above  GI: positive for weight loss, dysphagia  Renal: CKD; patient was told she \"would need dialysis soon\" but no scheduling for a shunt,   : frequent UTIs. MSK: positive for arthritis, frequent falls, denied prior fracture or osteoporosis. Objective:   Vital Signs:    Visit Vitals    /74 (BP 1 Location: Left arm, BP Patient Position: At rest)    Pulse 79    Temp 99.5 °F (37.5 °C)    Resp 18    Ht 5' 6\" (1.676 m)    Wt 79.4 kg (175 lb)    SpO2 96%    Breastfeeding No    BMI 28.25 kg/m2       O2 Device: Room air       Temp (24hrs), Av.3 °F (36.8 °C), Min:97.6 °F (36.4 °C), Max:99.5 °F (37.5 °C)       Intake/Output:   Last shift:      701 - 1900  In: 150 [P.O.:150]  Out: -   Last 3 shifts: 1901 -  0700  In: 2097.5 [P.O.:600;  I.V.:1497.5]  Out: 900 [Urine:800]    Intake/Output Summary (Last 24 hours) at 09/11/17 1815  Last data filed at 09/11/17 1257   Gross per 24 hour   Intake              390 ml   Output                0 ml   Net              390 ml      Physical Exam:   General:  Alert, cooperative, pale, speaks slowly with difficulty word finding, no distress, appears stated age. Not dyspneic at rest.   Head:  Normocephalic, without obvious abnormality, atraumatic. Eyes:  Conjunctivae/corneas clear. Anicteric   Nose: Nares normal. Mucosa normal. No drainage or sinus tenderness. Throat: Lips, mucosa dry. NO thrush;    Neck: Supple, symmetrical, trachea midline, no adenopathy, thyroid: no enlargment/tenderness/nodules, no JVD. Back:   Symmetric, no curvature, no spine tenderness or flank pain   Lungs:   Bilateral auscultation is diminished without rales, wheezes or consolidation. Fair air movement. Adequate cough, no ronchi. Chest wall:  No tenderness or deformity. Heart:  Irregular rate and rhythm, S1, S2 normal, no murmur, click, rub or gallop. (using the C diff stethoscope)   Abdomen:   Soft, non-tender. Bowel sounds normal. No masses,  No organomegaly. No paradox   Extremities: normal, multiple scrapes and skin tears superficial of both distal LEs (falls), no erythema or purulence, no cyanosis or edema. Pulses:  normal UEs   Skin: Skin color pale, texture, turgor normal. No rashes.    Lymph nodes: Cervical, supraclavicular, and axillary nodes normal.   Neurologic: Grossly nonfocal          Data review:   Labs:  Recent Results (from the past 24 hour(s))   METABOLIC PANEL, BASIC    Collection Time: 09/11/17  4:12 AM   Result Value Ref Range    Sodium 143 136 - 145 mmol/L    Potassium 3.6 3.5 - 5.5 mmol/L    Chloride 115 (H) 100 - 108 mmol/L    CO2 20 (L) 21 - 32 mmol/L    Anion gap 8 3.0 - 18 mmol/L    Glucose 87 74 - 99 mg/dL    BUN 21 (H) 7.0 - 18 MG/DL    Creatinine 1.83 (H) 0.6 - 1.3 MG/DL    BUN/Creatinine ratio 11 (L) 12 - 20      GFR est AA 33 (L) >60 ml/min/1.73m2    GFR est non-AA 27 (L) >60 ml/min/1.73m2    Calcium 7.9 (L) 8.5 - 10.1 MG/DL   VANCOMYCIN, RANDOM    Collection Time: 09/11/17  4:12 AM   Result Value Ref Range    Vancomycin, random 20.3 5.0 - 40.0 UG/ML   PROTHROMBIN TIME + INR    Collection Time: 09/11/17  4:00 PM   Result Value Ref Range    Prothrombin time 29.8 (H) 11.5 - 15.2 sec    INR 2.9 (H) 0.8 - 1.2      Stool c diff positive    PFT Results  (Last 48 hours)    None        Echo Results  (Last 48 hours)    None        Imaging:  I have personally reviewed the patients radiographs and have reviewed the reports:  CXR Results  (Last 48 hours)               09/10/17 1447  XR CHEST PORT Final result    Impression:  Impression:       Cardiomegaly. Low lung volumes with by basilar hypoventilatory changes. Possible   small bilateral pleural effusions. No change in the two-day interval.           Narrative:  CHEST PORTABLE       CPT CODE: 69919       COMPARISON: 9/8/2017       INDICATIONS: Shortness of breath       FINDINGS: The heart is enlarged. The aorta is calcified. Lung volumes are low   with by basilar hypoventilatory changes. There is suggestion of small bilateral   effusions. There is been no appreciable change in the two-day interval. There   are fractures involving the posterior right at least fifth through eighth ribs. These appear to be old. I also appreciated Kerley B lines, cephalization of vasculature. -LB    CT Results  (Last 48 hours)    None          HRCT chest 10/31/16: compared to 2014 and 2013:  Mild hyperinflation of lungs, indicating COPD without bulla formation.    At the bases of lower lobes of both lungs, basal right middle lobe and lower  lingula there are chronic mild to moderate inhomogeneous fibrotic/atelectatic  changes. In rest of both lungs there is no obvious fibrosis. No typical finding of  interstitial fibrosis, nor bronchiectasis. No evidence of any groundglass  opacity or infiltrates in lungs.   Old healed fractures of multiple right ribs.  No evidence of pleural plaques or pleural calcifications. Only nonspecific mild  pleural thickenings are noted. PFTs:     DATE FEV/FVC FEV1 best FVC best TLC RV VC DLCO   Feb 2013 78% 1.42L (67%) 1.73L (59%) 2.67L (55%) 1.0 (53%) 1.68L (57%) 8.55 (47%)   Aug 2013 76% 1.37L (59%) 1.80L (60%) 2.99L (59%) 1.25L (60%) 1.74L (58%) 9.24 (37%)   May 2014 77% 1.39L (58%) 1.81L (58%) 3.20L (62%) 1.44L (72%) 1.76L (56%) 10.25 (45%)   Sept 2016 81% 1.47L (65%) 1.82L (62%) 2.95L (58%) 1.15L (53%) 1.80L (62%) 8.35 (34%)     These are consistent with moderate restriction with stable to improving trends, and no airway obstruction. (no COPD). (LBaggott summary of data)    6 minute walk test on room air 11/3/16 was completed without difficulty, with minimum SpO2 97% on room air. EKG 9/10:  Sinus rhythm with 1st degree AV block   Incomplete right bundle branch block   Inferior infarct (cited on or before 01-MAR-2013)   Abnormal ECG   When compared with ECG of 02-OCT-2014 14:46,   VT interval has increased   Incomplete right bundle branch block is now present   Nonspecific T wave abnormality no longer evident in Lateral leads   Confirmed by Christian Monaco MD, ----- ([id number]) on 9/10/2017 4:01:09 PM     High complexity decision making was performed during the evaluation of this patient at high risk for decompensation with multiple organ involvement     2 hours total time spent on reviewing the case/medical record/data/notes/EMR/patient examination/documentation/coordinating care with nurse/consultants, exclusive of procedures with complex decision making performed and > 50% time spent in face to face evaluation.      Gayle Beckham MD

## 2017-09-11 NOTE — CDMP QUERY
Sepsis was noted in the [ED Provider Notes, H/P, etc.]; however, it is not noted in subsequent documentation. Please clarify if this condition was:and please place on d/c summary    Treated & resolved  Ongoing/Improving  Ruled Out  Other Explanation of the Clinical Findings  Unable to Determine (no explanation for clinical findings)    The medical record reflects the following clinical findings, risk factors and treatment:     Risk Factors:  SIRS - possible early sepsis with concern for gi source vs occult bacteremia vs cardiac source with significant known valvular disease and possible infectious seeding vs alternative  Clinical Indicators:  fever,hypotension dx cdiff  Treatments:IV ABX therapy ordered     Please clarify and document your clinical opinion in the progress notes and discharge summary including the definitive and/or presumptive diagnosis, (suspected or probable), related to the above clinical findings. Please include clinical findings supporting your diagnosis.      Thank you,marj

## 2017-09-12 NOTE — PROGRESS NOTES
Problem: Falls - Risk of  Goal: *Absence of Falls  Document Issa Fall Risk and appropriate interventions in the flowsheet.    Outcome: Progressing Towards Goal  Fall Risk Interventions:  Mobility Interventions: Bed/chair exit alarm, Patient to call before getting OOB           Medication Interventions: Bed/chair exit alarm, Patient to call before getting OOB     Elimination Interventions: Call light in reach, Patient to call for help with toileting needs     History of Falls Interventions: Bed/chair exit alarm, Door open when patient unattended

## 2017-09-12 NOTE — ROUTINE PROCESS
Bedside and Verbal shift change report given to Lashaun Gallegos (oncoming nurse) by Tim Cordon RN (offgoing nurse). Report included the following information SBAR, Kardex, MAR and Recent Results. SITUATION:    Code Status: Full Code  Reason for Admission: GI bleed  Acute febrile illness  Generalized weakness   Sepsis ([facility] [address]    [facility] day: 3   Problem List:       Hospital Problems  Date Reviewed: 8/3/2017          Codes Class Noted POA    GI bleed ICD-10-CM: K92.2  ICD-9-CM: 578.9  9/9/2017 Unknown        Generalized weakness ICD-10-CM: R53.1  ICD-9-CM: 780.79  9/9/2017 Unknown        Acute febrile illness ICD-10-CM: R50.9  ICD-9-CM: 780.60  9/9/2017 Unknown        Sepsis ([facility] [address] ICD-10-CM: A41.9  ICD-9-CM: 038.9, 995.91  9/9/2017 Unknown        Anemia ICD-10-CM: D64.9  ICD-9-CM: 285.9  5/20/2014 Yes        Chronic diastolic heart failure ([facility] [address] ICD-10-CM: I50.32  ICD-9-CM: 428.32  Unknown Yes    Overview Addendum 2/3/2017 11:44 AM by Nia Sy MD     2/17; 11/16; 8/16 [id number];   Stable symptoms;  11/16 much better chr edema             Chest pain, unspecified ICD-10-CM: R07.9  ICD-9-CM: 786.50  Unknown Yes    Overview Addendum 4/26/2013 12:16 PM by Nia Sy MD     Likely musculoskeletal/pleurotoc; will Rx medically now and consider cath in future if needed             Chronic kidney disease, unspecified ICD-10-CM: N18.9  ICD-9-CM: 725. 9  Unknown Yes        Paroxysmal atrial fibrillation (HCC) ICD-10-CM: I48.0  ICD-9-CM: 427.31  Unknown Yes    Overview Addendum 2/3/2017 11:43 AM by Nia Sy MD     2/17 occ palp but SR on EKG; 11/16 SR on rythmol  pt in SR since 3/13 atleast  5/14 amio d/thomas due to hyperthyroid; 4/15 nl TFT; 5/15; 10/15 SR on rhythmol  chr warfarin due to recurrent DVT; f/u INR PCP                   BACKGROUND:    Past Medical History:   Past Medical History:   Diagnosis Date    A-fib [facility])     Aortic valve disorders 8/31/2015    7/15 mild AS     Arrhythmia a-fib     Arthritis     Autoimmune disease ([facility] [address]     lupus 2001  Reynauds disease  Sjogrens    CAD (coronary artery disease)     AFIB  2011 Dr Angela Patton Chest pain, unspecified     Poss atyp angina, will Rx medically now and consider cath in future if needed    Chronic diastolic heart failure (HCC)     Stable symptoms    Chronic kidney disease     Chronic kidney disease, unspecified     Chronic venous embolism and thrombosis of unspecified deep vessels of lower extremity     8/11    Congestive heart failure, unspecified     Edema     improving    Essential hypertension     Hyperlipidemia     Hypertension     Lupus ([facility] [address]     Mitral regurgitation and mitral stenosis 10/29/2015    7/15 mild     Mitral valve disorders 8/31/2015    7/15 mild MS/MR     Pancytopenia (HCC)     Paroxysmal atrial fibrillation (HCC)     Rheumatoid arthritis ([facility] [address]          Patient taking anticoagulants no     ASSESSMENT:    Changes in Assessment Throughout Shift: none     Patient has Central Line: no Reasons if yes:    Patient has Epperson Cath: no Reasons if yes:       Last Vitals:     Vitals:    09/11/17 1652 09/11/17 2114 09/12/17 0042 09/12/17 0226   BP: 163/74 156/77 152/79 153/68   Pulse: 79 80 82 85   Resp: 18 17 16 16   Temp: 99.5 °F (37.5 °C) (!) 100.7 °F (38.2 °C) 98.5 °F (36.9 °C) 98.1 °F (36.7 °C)   SpO2: 96% 95% 95% 99%   Weight:       Height:            IV and DRAINS (will only show if present)   [REMOVED] Peripheral IV 09/09/17 Left Hand-Site Assessment: Clean, dry, & intact  [REMOVED] Peripheral IV 09/08/17 Left Antecubital-Site Assessment: Clean, dry, & intact  Peripheral IV 09/10/17 Right Forearm-Site Assessment: Clean, dry, & intact     WOUND (if present)   Wound Type:  none   Dressing present Dressing Present : No   Wound Concerns/Notes:  none     PAIN    Pain Assessment    Pain Intensity 1: 0 (09/12/17 0400)              Patient Stated Pain Goal: 0  o Interventions for Pain:  none  o Intervention effective:   o Time of last intervention:    o Reassessment Completed:       Last 3 Weights:  Last 3 Recorded Weights in this Encounter    09/08/17 2122 09/11/17 1035   Weight: 77.1 kg (170 lb) 79.4 kg (175 lb)     Weight change:      INTAKE/OUPUT    Current Shift:      Last three shifts: 09/10 1901 - 09/12 0700  In: 150 [P.O.:150]  Out: -      LAB RESULTS     Recent Labs      09/12/17   0400  09/10/17   0333  09/09/17   0935   WBC  4.1*  6.2   --    HGB  8.6*  8.5*  7.0*   HCT  29.3*  28.8*  25.1*   PLT  238  223   --         Recent Labs      09/12/17   0400  09/11/17   1600  09/11/17   0412  09/10/17   0333   NA  145   --   143  142   K  3.5   --   3.6  3.6   GLU  83   --   87  85   BUN  20*   --   21*  29*   CREA  1.63*   --   1.83*  2.07*   CA  8.3*   --   7.9*  7.3*   INR  3.0*  2.9*   --   2.5*       RECOMMENDATIONS AND DISCHARGE PLANNING     1. Pending tests/procedures/ Plan of Care or Other Needs: Pending Echo;Possible repeat of Nuclear Stress Test     2. Discharge plan for patient and Needs/Barriers: TBD    3. Estimated Discharge Date: 9/13/17 Posted on Whiteboard in [facility] Room: yes      4. The patient's care plan was reviewed with the oncoming nurse. \"HEALS\" SAFETY CHECK      Fall Risk    Total Score: 4    Safety Measures: Safety Measures: Bed/Chair-Wheels locked, Bed in low position, Call light within reach, Fall prevention (comment)    A safety check occurred in the patient's room between off going nurse and oncoming nurse listed above.     The safety check included the below items  Area Items   H  High Alert Medications - Verify all high alert medication drips (heparin, PCA, etc.)   E  Equipment - Suction is set up for ALL patients (with yanker)  - Red plugs utilized for all equipment (IV pumps, etc.)  - WOWs wiped down at end of shift.  - Room stocked with oxygen, suction, and other unit-specific supplies   A  Alarms - Bed alarm is set for fall risk patients  - Ensure chair alarm is in place and activated if patient is up in a chair   L  Lines - Check IV for any infiltration  - Epperson bag is empty if patient has a Epperson   - Tubing and IV bags are labeled   S  Safety   - Room is clean, patient is clean, and equipment is clean. - Hallways are clear from equipment besides carts. - Fall bracelet on for fall risk patients  - Ensure room is clear and free of clutter  - Suction is set up for ALL patients (with yanker)  - Hallways are clear from equipment besides carts.    - Isolation precautions followed, supplies available outside room, sign posted     Mauri Rowell RN

## 2017-09-12 NOTE — PROGRESS NOTES
Bedside and Verbal shift change report given to Kaela RN (oncoming nurse) by Darron Ma RN (offgoing nurse). Report included the following information SBAR, Kardex, MAR and Recent Results.     SITUATION:   · Code Status: Full Code  · Reason for Admission: GI bleed  · Acute febrile illness  · Generalized weakness  · Sepsis ([facility] [address]     · Hospital day: 1  · Problem List:   [facility] Problems  Date Reviewed: 8/3/2017                         Codes Class Noted POA                GI bleed ICD-10-CM: K92.2  ICD-9-CM: 795. 9   9/9/2017 Unknown             Generalized weakness ICD-10-CM: R53.1  ICD-9-CM: 780.79   9/9/2017 Unknown             Acute febrile illness ICD-10-CM: R50.9  ICD-9-CM: 780.60   9/9/2017 Unknown             Sepsis ([facility] [address] ICD-10-CM: A41.9  ICD-9-CM: 038.9, 995.91   9/9/2017 Unknown             Anemia ICD-10-CM: D64.9  ICD-9-CM: 318. 9   5/20/2014 Yes             Chronic diastolic heart failure ([facility] [address] ICD-10-CM: I50.32  ICD-9-CM: 428.32   Unknown Yes      Overview Addendum 2/3/2017 11:44 AM by Leatha Sweeney MD        2/17; 11/16; 8/16 [id number];   Stable symptoms;  11/16 much better chr edema                Chest pain, unspecified ICD-10-CM: R07.9  ICD-9-CM: 786.50   Unknown Yes      Overview Addendum 4/26/2013 12:16 PM by Leatha Sweeney MD        Likely musculoskeletal/pleurotoc; will Rx medically now and consider cath in future if needed                Chronic kidney disease, unspecified ICD-10-CM: N18.9  ICD-9-CM: 878. 9   Unknown Yes             Paroxysmal atrial fibrillation (HCC) ICD-10-CM: I48.0  ICD-9-CM: 427.31   Unknown Yes      Overview Addendum 2/3/2017 11:43 AM by Leatha Sweeney MD        2/17 occ palp but SR on EKG; 11/16 SR on rythmol  pt in SR since 3/13 atleast  5/14 amio d/thomas due to hyperthyroid; 4/15 nl TFT; 5/15; 10/15 SR on rhythmol  chr warfarin due to recurrent DVT; f/u INR PCP                       BACKGROUND:                         Past Medical History:        Past Medical History:   Diagnosis Date    A-fib [facility])      Aortic valve disorders 8/31/2015     7/15 mild AS     Arrhythmia       a-fib     Arthritis      Autoimmune disease ([facility] [address]       lupus 2001  Reynauds disease  Sjogrens    CAD (coronary artery disease)       AFIB  2011 Dr Arlene Lennox Chest pain, unspecified       Poss atyp angina, will Rx medically now and consider cath in future if needed    Chronic diastolic heart failure (HCC)       Stable symptoms    Chronic kidney disease      Chronic kidney disease, unspecified      Chronic venous embolism and thrombosis of unspecified deep vessels of lower extremity       8/11    Congestive heart failure, unspecified      Edema       improving    Essential hypertension      Hyperlipidemia      Hypertension      Lupus (HCC)      Mitral regurgitation and mitral stenosis 10/29/2015     7/15 mild     Mitral valve disorders 8/31/2015     7/15 mild MS/MR     Pancytopenia (HCC)      Paroxysmal atrial fibrillation (HCC)      Rheumatoid arthritis ([facility] [address]                                  Patient taking anticoagulants no      ASSESSMENT:   ¨ Changes in Assessment Throughout Shift: none     ¨ Patient has Central Line: no Reasons if yes:   ¨ Patient has Epperson Cath: no Reasons if yes:       ¨ Last Vitals:             Vitals:     09/10/17 0905 09/10/17 1222 09/10/17 1549 09/10/17 2020   BP: 155/90 145/65 124/78 128/63   Pulse: 90 79 78 81   Resp: 17 19 18 20   Temp: 99.1 °F (37.3 °C) 98.5 °F (36.9 °C) 99.5 °F (37.5 °C) 98 °F (36.7 °C)   SpO2: 100% 97% 93% 96%   Weight:           Height:                 ¨ IV and DRAINS (will only show if present)                        [REMOVED] Peripheral IV 09/09/17 Left Hand-Site Assessment: Clean, dry, & intact  [REMOVED] Peripheral IV 09/08/17 Left Antecubital-Site Assessment: Clean, dry, & intact  Peripheral IV 09/10/17 Right Forearm-Site Assessment: Clean, dry, & intact     ¨ WOUND (if present)                        Wound Type:  none Dressing present Dressing Present : No                        Wound Concerns/Notes:  none     · PAIN                                              Pain Assessment                                              Pain Intensity 1: 0 (09/10/17 1600)                                                                                                                                            Patient Stated Pain Goal: 0  ¨ Interventions for Pain:  none  ¨ Intervention effective:   ¨ Time of last intervention:    ¨ Reassessment Completed:       · Last 3 Weights:      Last 3 Recorded Weights in this Encounter     09/08/17 2122   Weight: 77.1 kg (170 lb)      Weight change:      · INTAKE/OUPUT                                              Current Shift:                                                Last three shifts: 09/09 0701 - 09/10 1900  In: 2097.5 [P.O.:600; I.V.:1497.5]  Out: 900 [Urine:800]     · LAB RESULTS             Recent Labs       09/10/17   0333  09/09/17   0935  09/09/17 0630 09/08/17 2138   WBC  6.2   --    --   8.6   HGB  8.5*  7.0*  7.1*  7.6*   HCT  28.8*  25.1*  24.6*  26.2*   PLT  223   --    --   274                 Recent Labs       09/10/17   0333  09/09/17   1800  09/09/17 0630 09/08/17 2138   NA  142   --   143  139   K  3.6   --   3.6  3.7   GLU  85   --   117*  137*   BUN  29*   --   37*  35*   CREA  2.07*   --   2.92*  2.71*   CA  7.3*   --   7.6*  8.1*   MG   --    --    --   2.3   INR  2.5*  2.8*   --   2.2*         RECOMMENDATIONS AND DISCHARGE PLANNING      1. Pending tests/procedures/ Plan of Care or Other Needs: Pending Echo;Possible repeat of Nuclear Stress Test      2. Discharge plan for patient and Needs/Barriers: TBD     3. Estimated Discharge Date: 9/13/17 Posted on Whiteboard in Patients Room: yes       4.  The patient's care plan was reviewed with the oncoming nurse.       \"HEALS\" SAFETY CHECK                            Fall Risk                         Total Score: 4                         Safety Measures: Safety Measures: Bed/Chair alarm on, Bed/Chair-Wheels locked, Bed in low position, Call light within reach, Side rails X 3     A safety check occurred in the patient's room between off going nurse and oncoming nurse listed above.     The safety check included the below items  Area Items   H  High Alert Medications § Verify all high alert medication drips (heparin, PCA, etc.)   E  Equipment § Suction is set up for ALL patients (with tiera)  § Red plugs utilized for all equipment (IV pumps, etc.)  § WOWs wiped down at end of shift. § Room stocked with oxygen, suction, and other unit-specific supplies   A  Alarms § Bed alarm is set for fall risk patients  § Ensure chair alarm is in place and activated if patient is up in a chair   L  Lines § Check IV for any infiltration  § Epperson bag is empty if patient has a Epperson   § Tubing and IV bags are labeled   S  Safety § Room is clean, patient is clean, and equipment is clean. § Hallways are clear from equipment besides carts. § Fall bracelet on for fall risk patients  § Ensure room is clear and free of clutter  § Suction is set up for ALL patients (with tiera)  § Hallways are clear from equipment besides carts.    § Isolation precautions followed, supplies available outside room, sign posted

## 2017-09-12 NOTE — PROGRESS NOTES
Problem: Mobility Impaired (Adult and Pediatric)  Goal: *Acute Goals and Plan of Care (Insert Text)  Physical Therapy Goals  Initiated 9/12/2017 and to be accomplished within 7 day(s)  1. Patient will move from supine to sit and sit to supine in bed with modified independence. 2. Patient will transfer from bed to chair and chair to bed with supervision/set-up using the least restrictive device. 3. Patient will perform sit to stand with supervision/set-up. 4. Patient will ambulate with supervision/set-up for 100 feet with the least restrictive device. PHYSICAL THERAPY EVALUATION     Patient: Kehinde Sher (83 y.o. female)  Date: 9/12/2017  Primary Diagnosis: GI bleed  Acute febrile illness  Generalized weakness  Sepsis (HCC)        Precautions: fall         ASSESSMENT :  Based on the objective data described below, the patient presents with decreased strength, balance and activity tolerance resulting in decreased independence with functional mobility. Pt transitioned supine to sit with supervision and increased time. When sitting edge of bed patient complained of nausea and lightheadedness. Pt was returned to supine and symptoms began resolving. Patient will benefit from skilled intervention to address the above impairments.   Patients rehabilitation potential is considered to be Good  Factors which may influence rehabilitation potential include:   [ ]         None noted  [ ]         Mental ability/status  [ ]         Medical condition  [ ]         Home/family situation and support systems  [ ]         Safety awareness  [ ]         Pain tolerance/management  [ ]         Other:        PLAN :  Recommendations and Planned Interventions:  [ ]           Bed Mobility Training             [ ]    Neuromuscular Re-Education  [ ]           Transfer Training                   [ ]    Orthotic/Prosthetic Training  [ ]           Gait Training                          [ ]    Modalities  [ ]           Therapeutic Exercises          [ ]    Edema Management/Control  [ ]           Therapeutic Activities            [ ]    Patient and Family Training/Education  [ ]           Other (comment):     Frequency/Duration: Patient will be followed by physical therapy 1-2 times per day/4-7 days per week to address goals. Discharge Recommendations: [address] pending progress  Further Equipment Recommendations for Discharge: rolling walker       G-CODES       Mobility  Current  CJ= 20-39%   Goal  CI= 1-19%. The severity rating is based on the Level of Assistance required for Functional Mobility and ADLs. Eval Complexity: History: MEDIUM  Complexity : 1-2 comorbidities / personal factors will impact the outcome/ POC Exam:LOW Complexity : 1-2 Standardized tests and measures addressing body structure, function, activity limitation and / or participation in recreation  Presentation: MEDIUM Complexity : Evolving with changing characteristics  Clinical Decision Making:Low Complexity , Overall Complexity:LOW       SUBJECTIVE:   Patient stated I'm just kind of weak.       OBJECTIVE DATA SUMMARY:       Past Medical History:   Diagnosis Date    [facility]      Aortic valve disorders 8/31/2015     7/15 mild AS     [facility]     Arthritis      Autoimmune disease ([facility] [address]       lupus 2001  Reynauds disease  Sjogrens    CAD (coronary artery disease)       AFIB  2011 Dr Juancarlos Lynn Chest pain, unspecified       Poss atyp angina, will Rx medically now and consider cath in future if needed    Chronic diastolic heart failure (HCC)       Stable symptoms    Chronic kidney disease      Chronic kidney disease, unspecified      Chronic venous embolism and thrombosis of unspecified deep vessels of lower extremity       8/11    Congestive heart failure, unspecified      Edema       improving    Essential hypertension      Hyperlipidemia      Hypertension      Lupus ([facility] [address]      Mitral regurgitation and mitral stenosis 10/29/2015     7/15 mild     Mitral valve disorders 8/31/2015     7/15 mild MS/MR     Pancytopenia (HCC)      Paroxysmal atrial fibrillation (HCC)      Rheumatoid arthritis ([facility] [address]       Past Surgical History:   Procedure Laterality Date    ABDOMEN SURGERY PROC UNLISTED         gall bladder    BREAST SURGERY PROCEDURE UNLISTED         reduction    ENDOSCOPY, COLON, DIAGNOSTIC        FULL ESOPHAGEAL MANOMETRY   7/4/2016          HX CHOLECYSTECTOMY        HX GYN         hyst    HX OTHER SURGICAL         lymph nodes removed and eyelids raised     Prior Level of Function/Home Situation: mod I with mobility using rollator, pt reports several falls recently  Home Situation  Home Environment: Private residence  One/Two Story Residence: Other (Comment)  Living Alone: No  Support Systems: Family member(s)  Patient Expects to be Discharged to[de-identified] Other (comment)  Current DME Used/Available at Home: Walker  Critical Behavior:   calm and cooperative   Strength:    Strength: Generally decreased, functional (B LEs)   Tone & Sensation:   Tone: Normal       Range Of Motion:  AROM: Generally decreased, functional (B LEs)      Functional Mobility:  Bed Mobility:  Supine to Sit: Supervision; Additional time  Sit to Supine: Supervision; Additional time     Balance:   Sitting - Static: Good (unsupported)  Sitting - Dynamic: Fair (occasional)  Therapeutic Exercises: Ankle pumps, heel slides  Pain:  Pt reports 0/10 pain or discomfort prior to treatment. Pt reports 0/10 pain or discomfort post treatment. Activity Tolerance:   Fair-  Please refer to the flowsheet for vital signs taken during this treatment.   After treatment:   [ ]         Patient left in no apparent distress sitting up in chair  [X]         Patient left in no apparent distress in bed  [X]         Call bell left within reach  [X]         Nursing notified  [ ]         Caregiver present  [ ]         Bed alarm activated COMMUNICATION/EDUCATION:   [X]         Fall prevention education was provided and the patient/caregiver indicated understanding. [X]         Patient/family have participated as able in goal setting and plan of care. [X]         Patient/family agree to work toward stated goals and plan of care. [ ]         Patient understands intent and goals of therapy, but is neutral about his/her participation. [ ]         Patient is unable to participate in goal setting and plan of care.   Educated patient on activity pacing and transfer techniques     Thank you for this referral.  Yaw Novak, PT   Time Calculation: 14 mins

## 2017-09-12 NOTE — PROGRESS NOTES
[facility]  Progress Note    Patient: Paulo Robles Age: 73 y.o. : 1945 MR#: [id number] SSN: [id number]  Date: 2017     Subjective:   Pt c/o sob which is new. Assessment and Plan:   73year old female who presented on  with cc of weakness and fever.    -C.diff colitis- pt c/o multiple episodes of diarrhea for several months. Has had recent abx to treat UTI. Will cont flagyl.  -Anemia-chronic. Heme + brown stool per ED physician report in the setting of C.diff colitis. S/p GI eval with recommendations of no endoscopy at this time. Pt of note on coumadin for DVT/afib  with therapeutic INR. S/p PRBC transfusion 1 unit. Will hold coumadin until stability of Hgb continues to be established. Transfuse PRN. -Recent UTI-s/p abx. -SHRAVAN improving cont to monitor, renally dose meds, avoid nephrotoxic drugs. Will consult nephrology.  -History of Afib on coumadimn propafenone INR=2.5 today. Will hold coumadin until hgb stability continues to be established.  -Complaints of SOB likely multifactorial. Pt has history of mild to mod chronic pulm fibrosis thought likely to be secondary to her collagen vascular disease. She also has valvular disease  (mod AS and mod MS), mod pulm HTN, and likely exacerbated by anemia as documented above. CXR checked yesterday unrevealing. . Doubt PE given theraputic INR. Pulmonary recommendations noted, will give one time dose of lasix, transfuse PRN. -Complaints of chest pain s/p cards eval. Trop negative on bb statin. Appreciate input from cardiology. Cardiac w/u when stable. -Failure to thrive- pt presented with cc of progressive weakness. Likely multifactorial. Will consult PT/OT.                     ABIMAEL wilson Notes:      Case discussed with:  [x]Patient  [x]Family  [x]Nursing  []Case Management  DVT Prophylaxis:  []Lovenox  []Hep SQ  []SCDs  []Coumadin   []On Heparin gtt    Objective:   VS:   Visit Vitals    /77 (BP Patient Position: At rest)    Pulse 80    Temp (!) 100.7 °F (38.2 °C)    Resp 17    Ht 5' 6\" (1.676 m)    Wt 79.4 kg (175 lb)    SpO2 95%    Breastfeeding No    BMI 28.25 kg/m2      Tmax/24hrs: Temp (24hrs), Av.8 °F (37.1 °C), Min:97.6 °F (36.4 °C), Max:100.7 °F (38.2 °C)      Intake/Output Summary (Last 24 hours) at 179  Last data filed at 17 1257   Gross per 24 hour   Intake              150 ml   Output                0 ml   Net              150 ml       General:  Alert, awake, in NAD  Cardiovascular:  Rrr, no murmurs  Pulmonary:  ctab  GI:  Soft, nt, nd, +bs  Extremities:  No edema  Additional:      Labs:    Recent Results (from the past 24 hour(s))   METABOLIC PANEL, BASIC    Collection Time: 17  4:12 AM   Result Value Ref Range    Sodium 143 136 - 145 mmol/L    Potassium 3.6 3.5 - 5.5 mmol/L    Chloride 115 (H) 100 - 108 mmol/L    CO2 20 (L) 21 - 32 mmol/L    Anion gap 8 3.0 - 18 mmol/L    Glucose 87 74 - 99 mg/dL    BUN 21 (H) 7.0 - 18 MG/DL    Creatinine 1.83 (H) 0.6 - 1.3 MG/DL    BUN/Creatinine ratio 11 (L) 12 - 20      GFR est AA 33 (L) >60 ml/min/1.73m2    GFR est non-AA 27 (L) >60 ml/min/1.73m2    Calcium 7.9 (L) 8.5 - 10.1 MG/DL   VANCOMYCIN, RANDOM    Collection Time: 17  4:12 AM   Result Value Ref Range    Vancomycin, random 20.3 5.0 - 40.0 UG/ML   PROTHROMBIN TIME + INR    Collection Time: 17  4:00 PM   Result Value Ref Range    Prothrombin time 29.8 (H) 11.5 - 15.2 sec    INR 2.9 (H) 0.8 - 1.2         Signed By: Jazmin Childs MD     2017 10:28 PM

## 2017-09-12 NOTE — PROGRESS NOTES
[facility]  Pulmonary, Critical Care, and Sleep Medicine    Name: Barbara Thompson MRN: [id number]   : 1945 Hospital: [address] [place] [address]    Date: 2017        IMPRESSION:   1. Agree with cardiology that acute decompensation is likely cardiac in origin, there is no acute pulmonary pathology. Ischemia likely. 2.  Mild to moderate chronic pulmonary fibrosis at bilateral lung bases, with demonstrated radiographic stability from 1233-2542.  3.  Mixed connective tissue disease (CTD), with Lupus erythematosis, Sjogren's and Raynaud's diseases, chronic, maintained on prednisone 10 mg daily and azathioprine 50 mg BID, no evidence of recent flare. 4.  Moderate AS and moderate MS with concentric LV hypertrophy, managed by Dr. Elvia Alvarenga. 5.  Acute dyspneic episodes (and falls?) likely to have been precipitated by anemia +/- acute ischemia +/- rapid paroxysmal AFib, on top of limited reserve due to #1-3 and #5. 6.  Moderate pulmonary hypertension, pa=36mmHg, improved from . 7.  No evidence of hypoxemia or sleep apnea at baseline. 8.  Probable mild CHF in setting of anemia (?\"high output CHF\"), with 17 lb weight gain since 2016.      Patient Active Problem List   Diagnosis Code    Chronic diastolic heart failure (HCC) I50.32    Chest pain, unspecified R07.9    Essential hypertension, benign I10    Edema R60.9    Chronic kidney disease, unspecified N18.9    Lupus ([facility] M32.9    Chronic venous embolism and thrombosis of deep vessels of lower extremity ([facility]) I82.509    Rheumatoid arthritis (HCC) M06.9    Paroxysmal atrial fibrillation (HCC) I48.0    SOB (shortness of breath) R06.02    Chronic kidney disease N18.9    Arthritis M19.90    Hypertension I10    Autoimmune disease ([facility] M35.9    Memory loss R41.3    Ataxic gait R26.0    Polyneuropathy ([facility]) G62.9    Recurrent falls R29.6    Lupus (systemic lupus erythematosus) ([facility]) M32.9    Sjogren's syndrome ([facility] M35.00  High risk medication use Z79.899    Pancytopenia ([facility]) D61.818    Lumbosacral spinal stenosis M48.07    Spinal stenosis of lumbosacral region M48.07    Other and unspecified angina pectoris I20.9    Coronary atherosclerosis of native coronary artery I25.10    Anemia D64.9    DVT (deep venous thrombosis) ([facility]) I82.409    Weakness R53.1    Aortic stenosis I35.0    Mitral regurgitation and mitral stenosis I05.2    Pulmonary HTN ([facility]) I27.2    Hyperlipidemia E78.5    Acute on chronic diastolic congestive heart failure ([facility]) I50.33    GI bleed K92.2    Generalized weakness R53.1    Acute febrile illness R50.9    Sepsis ([facility]) A41.9      PLAN:   · Recommend maintaining SpO2 92-96%. · Continue PO azathioprine 50 mg BID and prednisone 10 mg daily for CVD-associated pulmonary fibrosis, no need for stress dose steroids unless patient has an acute MI. · Will sign off for now, please call if additional pulmonary input is needed. Subjective/Interval History:   Patient feels about the same, no dyspnea except when she first awoke, but not when she got out of bed for the commode.   No chest pain or pleurisy, f,c,sw,n,v.    Objective:   Vital Signs:    Visit Vitals    /75 (BP 1 Location: Left arm, BP Patient Position: At rest)    Pulse 80    Temp 99.1 °F (37.3 °C)    Resp 20    Ht 5' 6\" (1.676 m)    Wt 79.4 kg (175 lb)    SpO2 95%    Breastfeeding No    BMI 28.25 kg/m2       O2 Device: Room air       Temp (24hrs), Av.2 °F (37.3 °C), Min:98.1 °F (36.7 °C), Max:100.7 °F (38.2 °C)       Intake/Output:   Last shift:       07 - 1900  In: 460 [P.O.:460]  Out: -   Last 3 shifts: 09/10 1901 -  07  In: 150 [P.O.:150]  Out: -     Intake/Output Summary (Last 24 hours) at 17 1533  Last data filed at 17 1355   Gross per 24 hour   Intake              460 ml   Output                0 ml   Net              460 ml        Physical Exam:    General: in no apparent distress   HEENT: Normal   Neck: No abnormally enlarged lymph nodes. Chest: normal   Lungs: scant basilar rales bilaterally   Heart: Regular rate and rhythm   Abdomen: abdomen is soft without significant tenderness, masses, organomegaly or guarding   Extremity: negative   Neuro: alert   Skin: Skin color, texture, turgor normal. No rashes or lesion      DATA:  Labs:  Recent Labs      09/12/17   0400  09/10/17   0333   WBC  4.1*  6.2   HGB  8.6*  8.5*   HCT  29.3*  28.8*   PLT  238  223     Recent Labs      09/12/17   0400  09/11/17   1600  09/11/17   0412  09/10/17   0333   NA  145   --   143  142   K  3.5   --   3.6  3.6   CL  115*   --   115*  112*   CO2  22   --   20*  21   GLU  83   --   87  85   BUN  20*   --   21*  29*   CREA  1.63*   --   1.83*  2.07*   CA  8.3*   --   7.9*  7.3*   INR  3.0*  2.9*   --   2.5*     No results for input(s): PH, PCO2, PO2, HCO3, FIO2 in the last 72 hours.     PFT:                                                     Echo:    Imaging:  [x]I have personally reviewed the patients radiographs    Moderate complexity decision making was performed during the evaluation of this patient at moderate risk for decompensation with multiple organ involvement    Barbara Brown MD   Pulmonary, critical care and sleep medicine  9/12/2017 3:41 PM

## 2017-09-12 NOTE — INTERDISCIPLINARY ROUNDS
IDR/SLIDR Summary          Patient: Kamilla Shirley MRN: [id number]    Age: 73 y.o. YOB: 1945 Room/Bed: [facility]/   Admit Diagnosis: GI bleed  Acute febrile illness  Generalized weakness  Sepsis ([address]  Principal Diagnosis: <principal problem not specified>   Goals: Resolve GI bleed and control C Diff  Readmission: NO  Quality Measure: Not applicable  VTE Prophylaxis: Mechanical  Influenza Vaccine screening completed? YES  Pneumococcal Vaccine screening completed? YES  Mobility needs: Yes   Nutrition plan:Yes  Consults:Case Management    Financial concerns:No  Escalated to CM? YES  RRAT Score: 21   Interventions:Home Health  Testing due for pt today?  YES  LOS: 3 days Expected length of stay 3 days  Discharge plan: yes   PCP: Lisa Burnham MD  Transportation needs: No    Days before discharge:two or more days before discharge   Discharge disposition: Home    Signed:     Mohan Lucas RN  9/12/2017  4:12 AM

## 2017-09-12 NOTE — PROGRESS NOTES
[facility]      CARDIOLOGY PROGRESS NOTE  RECS:      1. Chest pain/ palpitations- likely angina in a patient with multiple risk factors. So far with negative troponin and EKG w/u acute ischemic changes. Patient with hx of Abnormal NUC stress test in the past. Will continue monitoring. Increase bb and continue statin. LHC will be risky wirh CKD  2. Paroxymal atrial fibrillation- currently NSR. Continue with Propafenone     3. CAD- Hx 4/14 abnormal stress test with lat ischemia; med Rx  4. Acute on chronic diastolic heart failure- appears compensated. use IV lasix today and f/u closley   5. AS and MS- moderate on recent echo   6. GI bleed- ok to hold coumadin. 7. Anemia- severe s/p transfusion 09/08/17 monitor H&H closely   8. Possible sepsis- unclear source possible UTI vs GI- w/u and management per medical team   9. SHRAVAN- creatinine slowly improving   10. Nausea and diarrhea- patient is on Flagyl. w/u and medications per medical team        SUMMARY: Echo 8/17  Left ventricle: Systolic function was hyperdynamic by visual assessment. Ejection fraction was estimated in the range of 70 % to 75 %. There were  no regional wall motion abnormalities. There was mild concentric  hypertrophy. There was dynamic obstruction at rest in the mid cavity  during systole and in diastole, with systolic mid-cavity obliteration. Doppler parameters were consistent with restrictive physiology, indicative  of decreased left ventricular diastolic compliance and/or increased left  atrial pressure. Right ventricle: Systolic function was reduced. Left atrium: The atrium was severely dilated. Mitral valve: There was marked annular calcification. There was  moderate-marked thickening. There was moderately reduced leaflet  separation. The findings were consistent with moderate mitral stenosis. There was mild regurgitation. Mean transmitral gradient was 5.66 mmHg. Aortic valve: The valve was probably trileaflet. Leaflets exhibited  moderately to markedly increased thickness and mildly reduced cuspal  separation. Transaortic velocity was increased due to valvular stenosis. There was moderate stenosis. Valve mean gradient was 24.45 mmHg. Tricuspid valve: There was moderate regurgitation. Pulmonary artery  systolic pressure: 36 mmHg. Pulmonic valve: There was mild regurgitation. ASSESSMENT:  Hospital Problems  Date Reviewed: 8/3/2017          Codes Class Noted POA    GI bleed ICD-10-CM: K92.2  ICD-9-CM: 578.9  9/9/2017 Unknown        Generalized weakness ICD-10-CM: R53.1  ICD-9-CM: 780.79  9/9/2017 Unknown        Acute febrile illness ICD-10-CM: R50.9  ICD-9-CM: 780.60  9/9/2017 Unknown        Sepsis ([facility] ICD-10-CM: A41.9  ICD-9-CM: 038.9, 995.91  9/9/2017 Unknown        Anemia ICD-10-CM: D64.9  ICD-9-CM: 285.9  5/20/2014 Yes        Chronic diastolic heart failure ([facility] ICD-10-CM: I50.32  ICD-9-CM: 428.32  Unknown Yes    Overview Addendum 2/3/2017 11:44 AM by Rudy Ramirez MD     2/17; 11/16; 8/16 [id number];   Stable symptoms;  11/16 much better chr edema             Chest pain, unspecified ICD-10-CM: R07.9  ICD-9-CM: 786.50  Unknown Yes    Overview Addendum 4/26/2013 12:16 PM by Rudy Ramirez MD     Likely musculoskeletal/pleurotoc; will Rx medically now and consider cath in future if needed             Chronic kidney disease, unspecified ICD-10-CM: N18.9  ICD-9-CM: 895. 9  Unknown Yes        Paroxysmal atrial fibrillation ([facility] ICD-10-CM: I48.0  ICD-9-CM: 427.31  Unknown Yes    Overview Addendum 2/3/2017 11:43 AM by Rudy Ramirez MD     2/17 occ palp but SR on EKG; 11/16 SR on rythmol  pt in SR since 3/13 atleast  5/14 amio d/thomas due to hyperthyroid; 4/15 nl TFT; 5/15; 10/15 SR on rhythmol  chr warfarin due to recurrent DVT; f/u INR PCP                     SUBJECTIVE:    CP this am with palpitations   C/o Nausea   C/o SOB no improvement     OBJECTIVE:    VS:   Visit Vitals    /74 (BP 1 Location: Left arm, BP Patient Position: At rest)    Pulse 84    Temp 98.9 °F (37.2 °C)    Resp 18    Ht 5' 6\" (1.676 m)    Wt 79.4 kg (175 lb)    SpO2 96%    Breastfeeding No    BMI 28.25 kg/m2         Intake/Output Summary (Last 24 hours) at 09/12/17 1143  Last data filed at 09/12/17 0954   Gross per 24 hour   Intake              370 ml   Output                0 ml   Net              370 ml     TELE: normal sinus rhythm    General: alert, well developed, dyspneic and pleasant  HENT: Normocephalic, atraumatic. Normal external eye. Neck :  JVD difficult to assess due to obesity, likely high  Cardiac:  regular rate and rhythm, S1, S2 normal, no S3 or S4, no click, no rub. High-pitched harsh midsystolic murmur is present with a grade of 3/6  at the apex radiating to the neck  Lungs: diminished bilaterally.   Abdomen: Soft, nontender, no masses  Extremities:  No c/c/e, peripheral pulses present      Labs: Results:       Chemistry Recent Labs      09/12/17   0400  09/11/17   0412  09/10/17   0333   GLU  83  87  85   NA  145  143  142   K  3.5  3.6  3.6   CL  115*  115*  112*   CO2  22  20*  21   BUN  20*  21*  29*   CREA  1.63*  1.83*  2.07*   CA  8.3*  7.9*  7.3*   AGAP  8  8  9   BUCR  12  11*  14      CBC w/Diff Recent Labs      09/12/17   0400  09/10/17   0333   WBC  4.1*  6.2   RBC  3.57*  3.51*   HGB  8.6*  8.5*   HCT  29.3*  28.8*   PLT  238  223   GRANS  50  65   LYMPH  37  24   EOS  2  0      Cardiac Enzymes Recent Labs      09/09/17   1800   CPK  146   CKND1  1.0      Coagulation Recent Labs      09/12/17   0400  09/11/17   1600   PTP  30.7*  29.8*   INR  3.0*  2.9*       Lipid Panel Lab Results   Component Value Date/Time    Cholesterol, total 94 09/10/2017 03:33 AM    HDL Cholesterol 41 09/10/2017 03:33 AM    LDL, calculated 36.4 09/10/2017 03:33 AM    VLDL, calculated 16.6 09/10/2017 03:33 AM    Triglyceride 83 09/10/2017 03:33 AM    CHOL/HDL Ratio 2.3 09/10/2017 03:33 AM      BNP No results for input(s): BNPP in the last 72 hours. Liver Enzymes No results for input(s): TP, ALB, TBIL, AP, SGOT, GPT in the last 72 hours. No lab exists for component: DBIL   Thyroid Studies Lab Results   Component Value Date/Time    TSH 1.77 09/08/2017 09:38 PM              [address]   Patient seen independently  Discussed the details with NP and patient.  Please see orders & recommendations  Rudy Ramirez MD

## 2017-09-12 NOTE — ROUTINE PROCESS
NO STAUS CHANGE;PRN MED PER ORDER;AWAKE SITTING UP IN BED EATING LUNCH IN [email or web address] FLORINA W/IN REACH

## 2017-09-12 NOTE — PROGRESS NOTES
Care Management Interventions  Last Visit to PCP: 09/08/17  Physical Therapy Consult: Yes  Occupational Therapy Consult: Yes  Current Support Network: Relative's Home, Family Lives Nearby  Confirm Follow Up Transport: Family  Plan discussed with Pt/Family/Caregiver: Yes     Pt is a 73year old female admitted for GI bleed, acute febrile illness. Pt is alert and oriented in room. Pt states that she resides in the home with her brother Melissa Velasco and her plan is to return home at discharge. Pt indicates being independent with ADLs and reports that she uses a cane and walker to assist with ambulation. Pt's sister Elvie Bazan is her POC. Pt mentions that she is active with [facility] and she wishes to continue using agency at discharge.

## 2017-09-13 NOTE — ROUTINE PROCESS
Bedside and Verbal shift change report given to Mitchell Peters, RN (oncoming nurse) by Beryle Ishikawa, RN (offgoing nurse). Report included the following information SBAR, Kardex, MAR and Recent Results. SITUATION:    Code Status: Full Code  Reason for Admission: GI bleed  Acute febrile illness  Generalized weakness   Sepsis ([facility] [address]    [facility] day: 4   Problem List:       Hospital Problems  Date Reviewed: 8/3/2017          Codes Class Noted POA    GI bleed ICD-10-CM: K92.2  ICD-9-CM: 578.9  9/9/2017 Unknown        Generalized weakness ICD-10-CM: R53.1  ICD-9-CM: 780.79  9/9/2017 Unknown        Acute febrile illness ICD-10-CM: R50.9  ICD-9-CM: 780.60  9/9/2017 Unknown        Sepsis ([facility] [address] ICD-10-CM: A41.9  ICD-9-CM: 038.9, 995.91  9/9/2017 Unknown        Anemia ICD-10-CM: D64.9  ICD-9-CM: 285.9  5/20/2014 Yes        Chronic diastolic heart failure ([facility] [address] ICD-10-CM: I50.32  ICD-9-CM: 428.32  Unknown Yes    Overview Addendum 2/3/2017 11:44 AM by Ignacio Alejandro MD     2/17; 11/16; 8/16 VUXP2;   Stable symptoms;  11/16 much better chr edema             Chest pain, unspecified ICD-10-CM: R07.9  ICD-9-CM: 786.50  Unknown Yes    Overview Addendum 4/26/2013 12:16 PM by Ignacio Alejandro MD     Likely musculoskeletal/pleurotoc; will Rx medically now and consider cath in future if needed             Chronic kidney disease, unspecified ICD-10-CM: N18.9  ICD-9-CM: 813. 9  Unknown Yes        Paroxysmal atrial fibrillation (HCC) ICD-10-CM: I48.0  ICD-9-CM: 427.31  Unknown Yes    Overview Addendum 2/3/2017 11:43 AM by Ignacio Alejandro MD     2/17 occ palp but SR on EKG; 11/16 SR on rythmol  pt in SR since 3/13 atleast  5/14 amio d/thomas due to hyperthyroid; 4/15 nl TFT; 5/15; 10/15 SR on rhythmol  chr warfarin due to recurrent DVT; f/u INR PCP                   BACKGROUND:    Past Medical History:   Past Medical History:   Diagnosis Date    [facility]     Aortic valve disorders 8/31/2015    7/15 mild AS     Arrhythmia a-fib     Arthritis     Autoimmune disease ([facility] [address]     lupus 2001  Reynauds disease  Sjogrens    CAD (coronary artery disease)     AFIB  2011 Dr Jerson Bautista Chest pain, unspecified     Poss atyp angina, will Rx medically now and consider cath in future if needed    Chronic diastolic heart failure (HCC)     Stable symptoms    Chronic kidney disease     Chronic kidney disease, unspecified     Chronic venous embolism and thrombosis of unspecified deep vessels of lower extremity     8/11    Congestive heart failure, unspecified     Edema     improving    Essential hypertension     Hyperlipidemia     Hypertension     Lupus ([facility] [address]     Mitral regurgitation and mitral stenosis 10/29/2015    7/15 mild     Mitral valve disorders 8/31/2015    7/15 mild MS/MR     Pancytopenia (HCC)     Paroxysmal atrial fibrillation (HCC)     Rheumatoid arthritis ([facility] [address]          Patient taking anticoagulants no     ASSESSMENT:    Changes in Assessment Throughout Shift: none     Patient has Central Line: no Reasons if yes:    Patient has Epperson Cath: no Reasons if yes:       Last Vitals:     Vitals:    09/12/17 1511 09/12/17 2118 09/13/17 0000 09/13/17 0400   BP: 151/75 158/71 155/69 152/72   Pulse: 80 77 81 81   Resp: 20 20 20 20   Temp: 99.1 °F (37.3 °C) 99.1 °F (37.3 °C) 99.2 °F (37.3 °C) 99.3 °F (37.4 °C)   SpO2: 95% 95% 95% 95%   Weight:  79.6 kg (175 lb 6.4 oz)     Height:            IV and DRAINS (will only show if present)   [REMOVED] Peripheral IV 09/09/17 Left Hand-Site Assessment: Clean, dry, & intact  [REMOVED] Peripheral IV 09/08/17 Left Antecubital-Site Assessment: Clean, dry, & intact  Peripheral IV 09/10/17 Right Forearm-Site Assessment: Clean, dry, & intact     WOUND (if present)   Wound Type:  none   Dressing present Dressing Present : No   Wound Concerns/Notes:  none     PAIN    Pain Assessment    Pain Intensity 1: 0 (09/12/17 2000)              Patient Stated Pain Goal: 0  o Interventions for Pain:  NO STATED PAIN  o Intervention effective: YES  o Time of last intervention:  9/13/17  o Reassessment Completed: YES      Last 3 Weights:  Last 3 Recorded Weights in this Encounter    09/08/17 2122 09/11/17 1035 09/12/17 2118   Weight: 77.1 kg (170 lb) 79.4 kg (175 lb) 79.6 kg (175 lb 6.4 oz)     Weight change: 0.181 kg (6.4 oz)     INTAKE/OUPUT    Current Shift:      Last three shifts: 09/11 0701 - 09/12 1900  In: 610 [P.O.:610]  Out: -      LAB RESULTS     Recent Labs      09/13/17   0328  09/12/17   0400   WBC  5.1  4.1*   HGB  8.8*  8.6*   HCT  30.0*  29.3*   PLT  248  238        Recent Labs      09/13/17   0328  09/12/17   0400  09/11/17   1600  09/11/17   0412   NA  144  145   --   143   K  3.4*  3.5   --   3.6   GLU  92  83   --   87   BUN  19*  20*   --   21*   CREA  1.77*  1.63*   --   1.83*   CA  8.3*  8.3*   --   7.9*   INR  3.4*  3.0*  2.9*   --        RECOMMENDATIONS AND DISCHARGE PLANNING     1. Pending tests/procedures/ Plan of Care or Other Needs: Pending Echo;Possible repeat of Nuclear Stress Test     2. Discharge plan for patient and Needs/Barriers: TBD    3. Estimated Discharge Date: 9/15/17 Posted on Whiteboard in Hospitals in [place]: yes      4. The patient's care plan was reviewed with the oncoming nurse. \"HEALS\" SAFETY CHECK      Fall Risk    Total Score: 4    Safety Measures: Safety Measures: Bed/Chair-Wheels locked, Bed in low position, Call light within reach, Fall prevention (comment)    A safety check occurred in the patient's room between off going nurse and oncoming nurse listed above.     The safety check included the below items  Area Items   H  High Alert Medications - Verify all high alert medication drips (heparin, PCA, etc.)   E  Equipment - Suction is set up for ALL patients (with yanker)  - Red plugs utilized for all equipment (IV pumps, etc.)  - WOWs wiped down at end of shift.  - Room stocked with oxygen, suction, and other unit-specific supplies   A  Alarms - Bed alarm is set for fall risk patients  - Ensure chair alarm is in place and activated if patient is up in a chair   L  Lines - Check IV for any infiltration  - Epperson bag is empty if patient has a Epperson   - Tubing and IV bags are labeled   S  Safety   - Room is clean, patient is clean, and equipment is clean. - Hallways are clear from equipment besides carts. - Fall bracelet on for fall risk patients  - Ensure room is clear and free of clutter  - Suction is set up for ALL patients (with yanker)  - Hallways are clear from equipment besides carts.    - Isolation precautions followed, supplies available outside room, sign posted     Paty Xaveir RN

## 2017-09-13 NOTE — PROGRESS NOTES
[facility]      CARDIOLOGY PROGRESS NOTE  RECS:      1. Chest pain/ palpitations-no recurrence today. Her SOB is improving. Continue diuresis with lasix 40 mg ivp. Tolerating increased dose of  Bb.  continue statin. LHC will be risky wirh CKD. No need of stress test if CP improve with CHF Rx.  2. Paroxymal atrial fibrillation- currently NSR. Continue with Propafenone     3. Acute on chronic diastolic heart failure- decompensated. Continue diuresis with lasix   4. AS and MS- moderate on recent echo   5. GI bleed- ok to hold coumadin. 6. Anemia- severe s/p transfusion 09/08/17   7. SHRAVAN- monitor creatinine now on lasix ivp  8. Nausea and diarrhea- improving symptoms   9. Hypokalemia- given 20 +K meq once. Add aldactone        SUMMARY: Echo 8/17  Left ventricle: Systolic function was hyperdynamic by visual assessment. Ejection fraction was estimated in the range of 70 % to 75 %. There were  no regional wall motion abnormalities. There was mild concentric  hypertrophy. There was dynamic obstruction at rest in the mid cavity  during systole and in diastole, with systolic mid-cavity obliteration. Doppler parameters were consistent with restrictive physiology, indicative  of decreased left ventricular diastolic compliance and/or increased left  atrial pressure. Right ventricle: Systolic function was reduced. Left atrium: The atrium was severely dilated. Mitral valve: There was marked annular calcification. There was  moderate-marked thickening. There was moderately reduced leaflet  separation. The findings were consistent with moderate mitral stenosis. There was mild regurgitation. Mean transmitral gradient was 5.66 mmHg. Aortic valve: The valve was probably trileaflet. Leaflets exhibited  moderately to markedly increased thickness and mildly reduced cuspal  separation. Transaortic velocity was increased due to valvular stenosis. There was moderate stenosis.  Valve mean gradient was 24.45 mmHg.  Tricuspid valve: There was moderate regurgitation. Pulmonary artery  systolic pressure: 36 mmHg. Pulmonic valve: There was mild regurgitation. ASSESSMENT:  Hospital Problems  Date Reviewed: 8/3/2017          Codes Class Noted POA    GI bleed ICD-10-CM: K92.2  ICD-9-CM: 578.9  9/9/2017 Unknown        Generalized weakness ICD-10-CM: R53.1  ICD-9-CM: 780.79  9/9/2017 Unknown        Acute febrile illness ICD-10-CM: R50.9  ICD-9-CM: 780.60  9/9/2017 Unknown        Sepsis ([facility] ICD-10-CM: A41.9  ICD-9-CM: 038.9, 995.91  9/9/2017 Unknown        Anemia ICD-10-CM: D64.9  ICD-9-CM: 285.9  5/20/2014 Yes        Chronic diastolic heart failure ([facility] ICD-10-CM: I50.32  ICD-9-CM: 428.32  Unknown Yes    Overview Addendum 2/3/2017 11:44 AM by Isela Anaya MD     2/17; 11/16; 8/16 ABMI1;   Stable symptoms;  11/16 much better chr edema             Chest pain, unspecified ICD-10-CM: R07.9  ICD-9-CM: 786.50  Unknown Yes    Overview Addendum 4/26/2013 12:16 PM by Isela Anaya MD     Likely musculoskeletal/pleurotoc; will Rx medically now and consider cath in future if needed             Chronic kidney disease, unspecified ICD-10-CM: N18.9  ICD-9-CM: 796. 9  Unknown Yes        Paroxysmal atrial fibrillation ([facility] ICD-10-CM: I48.0  ICD-9-CM: 427.31  Unknown Yes    Overview Addendum 2/3/2017 11:43 AM by Isela Anaya MD     2/17 occ palp but SR on EKG; 11/16 SR on rythmol  pt in SR since 3/13 atleast  5/14 amio d/thomas due to hyperthyroid; 4/15 nl TFT; 5/15; 10/15 SR on rhythmol  chr warfarin due to recurrent DVT; f/u INR PCP                     SUBJECTIVE:    CP this am with palpitations   C/o Nausea   C/o SOB no improvement     OBJECTIVE:    VS:   Visit Vitals    /75 (BP 1 Location: Left arm, BP Patient Position: At rest)    Pulse 76    Temp 99.2 °F (37.3 °C)    Resp 19    Ht 5' 6\" (1.676 m)    Wt 79.6 kg (175 lb 6.4 oz)    SpO2 96%    Breastfeeding No    BMI 28.31 kg/m2         Intake/Output Summary (Last 24 hours) at 09/13/17 1018  Last data filed at 09/12/17 1355   Gross per 24 hour   Intake              240 ml   Output                0 ml   Net              240 ml     TELE: normal sinus rhythm    General: alert, well developed, dyspneic and pleasant  HENT: Normocephalic, atraumatic. Normal external eye. Neck :  JVD difficult to assess due to obesity, likely high  Cardiac:  regular rate and rhythm, S1, S2 normal, no S3 or S4, no click, no rub. High-pitched harsh midsystolic murmur is present with a grade of 3/6  at the apex radiating to the neck  Lungs: diminished bilaterally. Abdomen: Soft, nontender, no masses  Extremities:  No c/c/e, peripheral pulses present      Labs: Results:       Chemistry Recent Labs      09/13/17 0328 09/12/17   0400  09/11/17   0412   GLU  92  83  87   NA  144  145  143   K  3.4*  3.5  3.6   CL  112*  115*  115*   CO2  24  22  20*   BUN  19*  20*  21*   CREA  1.77*  1.63*  1.83*   CA  8.3*  8.3*  7.9*   AGAP  8  8  8   BUCR  11*  12  11*      CBC w/Diff Recent Labs      09/13/17 0328 09/12/17   0400   WBC  5.1  4.1*   RBC  3.66*  3.57*   HGB  8.8*  8.6*   HCT  30.0*  29.3*   PLT  248  238   GRANS  55  50   LYMPH  34  37   EOS  1  2      Cardiac Enzymes No results for input(s): CPK, CKND1, ED in the last 72 hours. No lab exists for component: CKRMB, TROIP   Coagulation Recent Labs      09/13/17 0328 09/12/17   0400   PTP  33.5*  30.7*   INR  3.4*  3.0*       Lipid Panel Lab Results   Component Value Date/Time    Cholesterol, total 94 09/10/2017 03:33 AM    HDL Cholesterol 41 09/10/2017 03:33 AM    LDL, calculated 36.4 09/10/2017 03:33 AM    VLDL, calculated 16.6 09/10/2017 03:33 AM    Triglyceride 83 09/10/2017 03:33 AM    CHOL/HDL Ratio 2.3 09/10/2017 03:33 AM      BNP No results for input(s): BNPP in the last 72 hours. Liver Enzymes No results for input(s): TP, ALB, TBIL, AP, SGOT, GPT in the last 72 hours.     No lab exists for component: DBIL   Thyroid Studies Lab Results   Component Value Date/Time    TSH 1.77 09/08/2017 09:38 PM              Zane 57   Patient seen independently  Discussed the details with NP and patient.  Please see orders & recommendations  Drake Grimm MD

## 2017-09-13 NOTE — PROGRESS NOTES
Problem: Falls - Risk of  Goal: *Absence of Falls  Document Issa Fall Risk and appropriate interventions in the flowsheet.    Outcome: Progressing Towards Goal  Fall Risk Interventions:  Mobility Interventions: Bed/chair exit alarm, OT consult for ADLs           Medication Interventions: Bed/chair exit alarm, Patient to call before getting OOB     Elimination Interventions: Call light in reach, Patient to call for help with toileting needs     History of Falls Interventions: Bed/chair exit alarm, Door open when patient unattended

## 2017-09-13 NOTE — PROGRESS NOTES
Problem: Self Care Deficits Care Plan (Adult)  Goal: *Acute Goals and Plan of Care (Insert Text)  Occupational Therapy Goals  Initiated 9/13/2017 within 7 day(s). 1. Patient will perform grooming tasks at EOB with modified independence and fair+ dynamic sitting balance. 2. Patient will perform lower body dressing with modified independence utilizing AE, prn.  3. Patient will perform functional task in standing for 5 minutes with supervision for balance to increase activity tolerance for ADLs. 4. Patient will perform toilet transfers with supervision/set-up. 5. Patient will perform all aspects of toileting with supervision/set-up. 6. Patient will participate in upper extremity therapeutic exercise/activities with supervision/set-up for 8 minutes to increase BUE strength for ADLs. 7. Patient will utilize energy conservation techniques during functional activities with minimal verbal cues. Outcome: Progressing Towards Goal  OCCUPATIONAL THERAPY EVALUATION     Patient: Collette Profit (74 y.o. female)  Date: 9/13/2017  Primary Diagnosis: GI bleed  Acute febrile illness  Generalized weakness  Sepsis (HCC)        Precautions: Contact, Fall      ASSESSMENT :  Based on the objective data described below, the patient presents with impairments with regard to bed mobility, activity tolerance and independence in ADLs. Pt supine on arrival, initially agreeable to therapy. Per pt report, mod I in ADLs and functional mobility PTA. Lives on 1st floor; has walk-in shower with grab bars & shower chair. Upon preparing to maneuver to EOB, pt reported having bowel movement. Mod I/additional time for rolling, max/total A for pericare due to very loose stool. Pt able to perform bridge position x3 with min A in prep for LB dressing at bed level and ADLs at EOB. Following pericare and bed mobility, pt reporting fatigue & refusing to maneuver to EOB despite max encouragement.  Pt educated on importance of mobility; also educated on role of OT and POC. Recommend continued therapy during acute care stay. Pt left supine with HOB elevated and needs within reach. Patient will benefit from skilled intervention to address the above impairments. Patients rehabilitation potential is considered to be Good  Factors which may influence rehabilitation potential include:   [ ]             None noted  [ ]             Mental ability/status  [ ]             Medical condition  [ ]             Home/family situation and support systems  [ ]             Safety awareness  [ ]             Pain tolerance/management  [ ]             Other:           PLAN :  Recommendations and Planned Interventions:  [X]               Self Care Training                  [X]        Therapeutic Activities  [X]               Functional Mobility Training    [ ]        Cognitive Retraining  [X]               Therapeutic Exercises           [X]        Endurance Activities  [X]               Balance Training                   [ ]        Neuromuscular Re-Education  [ ]               Visual/Perceptual Training     [X]   Home Safety Training  [X]               Patient Education                 [X]        Family Training/Education  [ ]               Other (comment):     Frequency/Duration: Patient will be followed by occupational therapy 3-5 times a week to address goals.   Discharge Recommendations: Home Health vs. SNF pending OOB activity with therapy  Further Equipment Recommendations for Discharge: bedside commode       SUBJECTIVE:   Patient stated I'm over it\" (referring to transferring in/out of bed)      OBJECTIVE DATA SUMMARY:       Past Medical History:   Diagnosis Date    A-fib [facility])      Aortic valve disorders 8/31/2015     7/15 mild AS     Arrhythmia       a-fib     Arthritis      Autoimmune disease ([address]       lupus 2001  Reynauds disease  Sjogrens    CAD (coronary artery disease)       AFIB  2011 Dr Johanna Aguilar Chest pain, unspecified       Poss atyp angina, will Rx medically now and consider cath in future if needed    Chronic diastolic heart failure (HCC)       Stable symptoms    Chronic kidney disease      Chronic kidney disease, unspecified      Chronic venous embolism and thrombosis of unspecified deep vessels of lower extremity       8/11    Congestive heart failure, unspecified      Edema       improving    Essential hypertension      Hyperlipidemia      Hypertension      Lupus (HCC)      Mitral regurgitation and mitral stenosis 10/29/2015     7/15 mild     Mitral valve disorders 8/31/2015     7/15 mild MS/MR     Pancytopenia (HCC)      Paroxysmal atrial fibrillation (HCC)      Rheumatoid arthritis (HCC)       Past Surgical History:   Procedure Laterality Date    ABDOMEN SURGERY PROC UNLISTED         gall bladder    BREAST SURGERY PROCEDURE UNLISTED         reduction    ENDOSCOPY, COLON, DIAGNOSTIC        FULL ESOPHAGEAL MANOMETRY   7/4/2016          HX CHOLECYSTECTOMY        HX GYN         hyst    HX OTHER SURGICAL         lymph nodes removed and eyelids raised     Barriers to Learning/Limitations: None  Compensate with: visual, verbal, tactile, kinesthetic cues/model     GCODES:  Self Care  Current  CJ= 20-39%   Goal  CI= 1-19%. The severity rating is based on the Other Functional Assessment, MMT, ROM     Eval Complexity: History: MEDIUM Complexity : Expanded review of history including physical, cognitive and psychosocial  history ; Examination: MEDIUM Complexity : 3-5 performance deficits relating to physical, cognitive , or psychosocial skils that result in activity limitations and / or participation restrictions; Decision Making:MEDIUM Complexity : Patient may present with comorbidities that affect occupational performnce.  Miniml to moderate modification of tasks or assistance (eg, physical or verbal ) with assesment(s) is necessary to enable patient to complete evaluation      Prior Level of Function/Home Situation: Per pt report, pt was mod I with basic self care tasks and functional mobility PTA. Home Situation  Home Environment: Private residence  # Steps to Enter: 0  One/Two Story Residence: Two story, live on 1st floor  Living Alone: No  Support Systems: Family member(s)  Patient Expects to be Discharged to[de-identified] Private residence  Current DME Used/Available at Home: [address], [address], straight, Walker, rolling, Shower chair, Grab bars  Tub or Shower Type: Shower (has grab bars and shower chair)  [ ]  Right hand dominant           [ ]  Left hand dominant  Cognitive/Behavioral Status:  Neurologic State: Alert  Orientation Level: Oriented X4  Cognition: Appropriate decision making; Appropriate for age attention/concentration; Appropriate safety awareness; Follows commands  Safety/Judgement: Awareness of environment; Fall prevention      Skin: Intact (BUEs)  Edema: None noted (BUes)     Vision/Perceptual:    Acuity: Within Defined Limits       Coordination:  Coordination: Within functional limits (BUEs)  Fine Motor Skills-Upper: Right Intact; Left Intact    Gross Motor Skills-Upper: Right Intact; Left Intact      Balance:  Sitting:  (not assessed; pt refused despite max encouragement)  Standing:  (not assessed; pt refused)      Strength:  Strength: Generally decreased, functional (BUEs: 4/5)     Tone & Sensation:  Tone: Normal     Range of Motion:  AROM: Generally decreased, functional (BUEs: approx 3/4 shoulder flex; full elbow flex)     Functional Mobility and Transfers for ADLs:  Bed Mobility:  Rolling: Modified independent; Additional time  Supine to Sit:  (not assessed; pt refused)     Transfers:  Sit to Stand:  (not assessed; pt refused despite max encouargement)     ADL Assessment:  Feeding: Setup;Modified independent  Oral Facial Hygiene/Grooming: Setup;Modified Independent  Bathing: Minimum assistance  Upper Body Dressing: Setup;Supervision  Lower Body Dressing: Minimum assistance  Toileting:  Moderate assistance;Maximum assistance     Cognitive Retraining  Safety/Judgement: Awareness of environment; Fall prevention     Therapeutic Activity:  Pt engaged in bed mobility with Mod I/additional time for rolling with min vc's for BUE positioning on bedrails. During max/total A for pericare, pt performed bridging x3 attempts with min A in prep for LB dressing. Fair activity tolerance however pt refusing to maneuver to EOB. Pain:  Pre treatment pain level: 0/10  Post treatment pain level: 0/10  Pain Scale 1: Numeric (0 - 10)  Pain Intensity 1: 0     Activity Tolerance:  Fair  Please refer to the flowsheet for vital signs taken during this treatment. After treatment:   [ ] Patient left in no apparent distress sitting up in chair  [X] Patient left in no apparent distress in bed  [X] Call bell left within reach  [X] Nursing notified  [ ] Caregiver present  [ ] Bed alarm activated      COMMUNICATION/EDUCATION: Pt educated on role of OT, POC, and home safety. She verbalized understanding.   [X] Home safety education was provided and the patient/caregiver indicated understanding. [X] Patient/family have participated as able in goal setting and plan of care. [X] Patient/family agree to work toward stated goals and plan of care. [ ] Patient understands intent and goals of therapy, but is neutral about his/her participation. [ ] Patient is unable to participate in goal setting and plan of care.      Thank you for this referral.     Paradise Garcia MS OTR/L  Time Calculation: 24 mins

## 2017-09-13 NOTE — INTERDISCIPLINARY ROUNDS
IDR/SLIDR Summary          Patient: Espinoza Brizuela MRN: [id number]    Age: 73 y.o. YOB: 1945 Room/Bed: [facility]/   Admit Diagnosis: GI bleed  Acute febrile illness  Generalized weakness  Sepsis ([facility] [address]  Principal Diagnosis: <principal problem not specified>   Goals: Resolve GI bleed and control C Diff  Readmission: NO  Quality Measure: Not applicable  VTE Prophylaxis: Mechanical  Influenza Vaccine screening completed? YES  Pneumococcal Vaccine screening completed? YES  Mobility needs: Yes   Nutrition plan:Yes  Consults:Case Management    Financial concerns:No  Escalated to CM? YES  RRAT Score: 21   Interventions:Home Health  Testing due for pt today?  YES  LOS: 4 days Expected length of stay 3 days  Discharge plan: yes   PCP: Alena Ding MD  Transportation needs: No    Days before discharge:two or more days before discharge   Discharge disposition: Home    Signed:     José Cyr RN  9/13/2017  4:12 AM

## 2017-09-13 NOTE — PROGRESS NOTES
[facility] Hospitalist Group  Progress Note    Patient: Kamilla Shirley Age: 73 y.o. : 1945 MR#: [id number] SSN: [id number]  Date: 2017     Subjective:   Pt reports she continues to have diarrhea, chest pain and sob, to about the same degree as she has been lately    Assessment and Plan:   73year old female who presented on  with cc of weakness and fever.    -C.diff colitis- pt c/o multiple episodes of diarrhea for several months. Has had recent abx to treat UTI. Will cont flagyl. Pt with continued diarrhea.  -Anemia-chronic. Heme + brown stool per ED physician report in the setting of C.diff colitis. S/p GI eval with recommendations of no endoscopy at this time. Pt of note on coumadin for DVT/afib  with therapeutic INR. S/p PRBC transfusion 1 unit. Will hold coumadin until stability of Hgb continues to be established. Transfuse PRN. -Recent UTI-s/p abx. -SHRAVAN improving cont to monitor, renally dose meds, avoid nephrotoxic drugs. Will consult nephrology if no improvement with these measures. .  -History of Afib on coumadimn propafenone INR=3.0  today. Will hold coumadin until hgb stability continues to be established and when INR stops trending up.  -Complaints of SOB likely multifactorial. Pt has history of mild to mod chronic pulm fibrosis thought likely to be secondary to her collagen vascular disease. She also has valvular disease  (mod AS and mod MS), mod pulm HTN, and likely exacerbated by anemia as documented above. CXR checked recently unrevealing. . Doubt PE given theraputic INR. Pulmonary recommendations noted, was given one time dose of lasix, without much improvement. transfuse PRN. -Complaints of chest pain s/p cards eval. Trop negative on bb statin. Appreciate input from cardiology. Cardiac w/u when stable. -Failure to thrive- pt presented with cc of progressive weakness. Likely multifactorial. PT/OT consulted.                     ABIMAEL wilson Notes: Case discussed with:  [x]Patient  [x]Family  [x]Nursing  []Case Management  DVT Prophylaxis:  []Lovenox  []Hep SQ  []SCDs  []Coumadin   []On Heparin gtt    Objective:   VS:   Visit Vitals    /71 (BP 1 Location: Left arm, BP Patient Position: At rest)    Pulse 77    Temp 99.1 °F (37.3 °C)    Resp 20    Ht 5' 6\" (1.676 m)    Wt 79.6 kg (175 lb 6.4 oz)    SpO2 95%    Breastfeeding No    BMI 28.31 kg/m2      Tmax/24hrs: Temp (24hrs), Av.9 °F (37.2 °C), Min:98.1 °F (36.7 °C), Max:99.8 °F (37.7 °C)      Intake/Output Summary (Last 24 hours) at 17 2212  Last data filed at 17 1355   Gross per 24 hour   Intake              460 ml   Output                0 ml   Net              460 ml       General:  Alert, awake, in NAD  Cardiovascular:  Rrr, no murmurs  Pulmonary:  ctab  GI:  Soft, nt, nd, +bs  Extremities:  No edema  Additional:      Labs:    Recent Results (from the past 24 hour(s))   METABOLIC PANEL, BASIC    Collection Time: 17  4:00 AM   Result Value Ref Range    Sodium 145 136 - 145 mmol/L    Potassium 3.5 3.5 - 5.5 mmol/L    Chloride 115 (H) 100 - 108 mmol/L    CO2 22 21 - 32 mmol/L    Anion gap 8 3.0 - 18 mmol/L    Glucose 83 74 - 99 mg/dL    BUN 20 (H) 7.0 - 18 MG/DL    Creatinine 1.63 (H) 0.6 - 1.3 MG/DL    BUN/Creatinine ratio 12 12 - 20      GFR est AA 38 (L) >60 ml/min/1.73m2    GFR est non-AA 31 (L) >60 ml/min/1.73m2    Calcium 8.3 (L) 8.5 - 10.1 MG/DL   CBC WITH AUTOMATED DIFF    Collection Time: 17  4:00 AM   Result Value Ref Range    WBC 4.1 (L) 4.6 - 13.2 K/uL    RBC 3.57 (L) 4.20 - 5.30 M/uL    HGB 8.6 (L) 12.0 - 16.0 g/dL    HCT 29.3 (L) 35.0 - 45.0 %    MCV 82.1 74.0 - 97.0 FL    MCH 24.1 24.0 - 34.0 PG    MCHC 29.4 (L) 31.0 - 37.0 g/dL    RDW 18.6 (H) 11.6 - 14.5 %    PLATELET 085 806 - 907 K/uL    MPV 9.5 9.2 - 11.8 FL    NEUTROPHILS 50 40 - 73 %    LYMPHOCYTES 37 21 - 52 %    MONOCYTES 10 3 - 10 %    EOSINOPHILS 2 0 - 5 %    BASOPHILS 1 0 - 2 %    ABS. NEUTROPHILS 2.1 1.8 - 8.0 K/UL    ABS. LYMPHOCYTES 1.5 0.9 - 3.6 K/UL    ABS. MONOCYTES 0.4 0.05 - 1.2 K/UL    ABS. EOSINOPHILS 0.1 0.0 - 0.4 K/UL    ABS.  BASOPHILS 0.0 0.0 - 0.1 K/UL    DF AUTOMATED     PROTHROMBIN TIME + INR    Collection Time: 09/12/17  4:00 AM   Result Value Ref Range    Prothrombin time 30.7 (H) 11.5 - 15.2 sec    INR 3.0 (H) 0.8 - 1.2         Signed By: Jazmin Childs MD     September 12, 2017 10:28 PM

## 2017-09-13 NOTE — PROGRESS NOTES
Attempted x2 to see patient for PT treatment today. First attempt patient was receiving nursing care and on second attempt patient declined. Will continue to follow.    Chuy Byrd, PT

## 2017-09-14 NOTE — PROGRESS NOTES
Problem: Falls - Risk of  Goal: *Absence of Falls  Document Issa Fall Risk and appropriate interventions in the flowsheet.    Outcome: Progressing Towards Goal  Fall Risk Interventions:  Mobility Interventions: Bed/chair exit alarm, Patient to call before getting OOB           Medication Interventions: Bed/chair exit alarm, Patient to call before getting OOB     Elimination Interventions: Call light in reach, Bed/chair exit alarm, Patient to call for help with toileting needs     History of Falls Interventions: Bed/chair exit alarm, Door open when patient unattended

## 2017-09-14 NOTE — PROGRESS NOTES
[facility] Hospitalist Group  Progress Note    Patient: Anna Fernando Age: 73 y.o. : 1945 MR#: [id number] SSN: [id number]  Date: 2017     Subjective:   Pt reports improved sx of sob, diarrhea, still declines SNF placement. Assessment and Plan:   73year old female who presented on  with cc of weakness and fever.    -C.diff colitis- pt c/o multiple episodes of diarrhea for several months. Has had recent abx to treat UTI. Will cont PO vanc as pt reports improved symptoms.  -Anemia-chronic. Heme + brown stool per ED physician report in the setting of C.diff colitis. S/p GI eval with recommendations of no endoscopy at this time. Pt of note on coumadin for DVT/afib  with supratheraputic INR. S/p PRBC transfusion 1 unit. Will hold coumadin until stability of Hgb continues to be established. Transfuse PRN. -Recent UTI-s/p abx. -SHRAVAN improving cont to monitor, renally dose meds, avoid nephrotoxic drugs. Will consult nephrology if no improvement with these measures. .  -History of Afib on coumadimn propafenone supratheraputic INR continues despite holding coumadin, but no overt bleeding. Will cont to hold coumadin until hgb stability continues to be established and when INR stops trending up.  -Complaints of SOB likely multifactorial. Pt has history of mild to mod chronic pulm fibrosis thought likely to be secondary to her collagen vascular disease. She also has valvular disease  (mod AS and mod MS), mod pulm HTN, and likely exacerbated by anemia as documented above. CXR checked recently unrevealing. . Doubt PE given theraputic INR. Pulmonary recommendations noted. Has marked improvement with diuresis. To be started on po lasix tomorrow.  -Complaints of chest pain s/p cards eval. Trop negative on bb statin. Appreciate input from cardiology. Cardiac w/u if recurrence of chest pain, worsening HF.  -Failure to thrive- pt presented with cc of progressive weakness.  Likely multifactorial. PT/OT consulted. -Coagulopathy secondary to coumadin. Continuing to hold coumadin. No overt bleeding. Will cont to monitor. Disposition: pt continues to refuse SNF placement. Likely home with home health tomorrow.             ABIMAEL wilson Notes:      Case discussed with:  [x]Patient  [x]Family  []Nursing  []Case Management  DVT Prophylaxis:  []Lovenox  []Hep SQ  []SCDs  []Coumadin   []On Heparin gtt    Objective:   VS:   Visit Vitals    /83 (BP 1 Location: Left arm, BP Patient Position: At rest)    Pulse 79    Temp 99.4 °F (37.4 °C)    Resp 18    Ht 5' 6\" (1.676 m)    Wt 79.2 kg (174 lb 8 oz)    SpO2 96%    Breastfeeding No    BMI 28.17 kg/m2      Tmax/24hrs: Temp (24hrs), Av °F (37.2 °C), Min:98.2 °F (36.8 °C), Max:99.5 °F (37.5 °C)      Intake/Output Summary (Last 24 hours) at 17 1741  Last data filed at 17 0219   Gross per 24 hour   Intake              240 ml   Output                0 ml   Net              240 ml       General:  Alert, awake, in NAD  Cardiovascular:  Rrr, no murmurs  Pulmonary:  ctab  GI:  Soft, nt, nd, +bs  Extremities:  No edema  Additional:      Labs:    Recent Results (from the past 24 hour(s))   PROTHROMBIN TIME + INR    Collection Time: 17  4:00 AM   Result Value Ref Range    Prothrombin time 35.8 (H) 11.5 - 15.2 sec    INR 3.7 (H) 0.8 - 1.2     METABOLIC PANEL, BASIC    Collection Time: 17  4:00 AM   Result Value Ref Range    Sodium 143 136 - 145 mmol/L    Potassium 3.6 3.5 - 5.5 mmol/L    Chloride 113 (H) 100 - 108 mmol/L    CO2 23 21 - 32 mmol/L    Anion gap 7 3.0 - 18 mmol/L    Glucose 76 74 - 99 mg/dL    BUN 20 (H) 7.0 - 18 MG/DL    Creatinine 1.55 (H) 0.6 - 1.3 MG/DL    BUN/Creatinine ratio 13 12 - 20      GFR est AA 40 (L) >60 ml/min/1.73m2    GFR est non-AA 33 (L) >60 ml/min/1.73m2    Calcium 8.0 (L) 8.5 - 10.1 MG/DL   CBC WITH AUTOMATED DIFF    Collection Time: 17  4:00 AM   Result Value Ref Range    WBC 5.4 4.6 - 13.2 K/uL    RBC 3.65 (L) 4.20 - 5.30 M/uL    HGB 8.6 (L) 12.0 - 16.0 g/dL    HCT 29.9 (L) 35.0 - 45.0 %    MCV 81.9 74.0 - 97.0 FL    MCH 23.6 (L) 24.0 - 34.0 PG    MCHC 28.8 (L) 31.0 - 37.0 g/dL    RDW 19.0 (H) 11.6 - 14.5 %    PLATELET 446 463 - 780 K/uL    MPV 10.3 9.2 - 11.8 FL    NEUTROPHILS 59 40 - 73 %    LYMPHOCYTES 30 21 - 52 %    MONOCYTES 10 3 - 10 %    EOSINOPHILS 1 0 - 5 %    BASOPHILS 0 0 - 2 %    ABS. NEUTROPHILS 3.2 1.8 - 8.0 K/UL    ABS. LYMPHOCYTES 1.6 0.9 - 3.6 K/UL    ABS. MONOCYTES 0.5 0.05 - 1.2 K/UL    ABS. EOSINOPHILS 0.1 0.0 - 0.4 K/UL    ABS.  BASOPHILS 0.0 0.0 - 0.1 K/UL    DF AUTOMATED         Signed By: Ella Aase, MD     September 14, 2017 10:28 PM

## 2017-09-14 NOTE — PROGRESS NOTES
[facility]      CARDIOLOGY PROGRESS NOTE  RECS:      1. Chest pain/ palpitations-no recurrence today. Her SOB is improving. Continue diuresis with lasix 40 mg ivp. Will change to po tomorrow. 2. Paroxymal atrial fibrillation- currently NSR. Continue with Propafenone     3. Acute on chronic diastolic heart failure- SOB improving Continue diuresis with lasix   4. AS and MS- moderate on recent echo   5. GI bleed- ok to hold coumadin. 6. Anemia- severe s/p transfusion 09/08/17   7. SHRAVAN- monitor creatinine now on lasix ivp  8. Nausea and diarrhea- improving symptoms   9. Hypokalemia- resolved continue  Add aldactone    Given CKD, will continue medical management for now. Invasive w/u if HF worsens or recurs. SUMMARY: Echo 8/17  Left ventricle: Systolic function was hyperdynamic by visual assessment. Ejection fraction was estimated in the range of 70 % to 75 %. There were  no regional wall motion abnormalities. There was mild concentric  hypertrophy. There was dynamic obstruction at rest in the mid cavity  during systole and in diastole, with systolic mid-cavity obliteration. Doppler parameters were consistent with restrictive physiology, indicative  of decreased left ventricular diastolic compliance and/or increased left  atrial pressure. Right ventricle: Systolic function was reduced. Left atrium: The atrium was severely dilated. Mitral valve: There was marked annular calcification. There was  moderate-marked thickening. There was moderately reduced leaflet  separation. The findings were consistent with moderate mitral stenosis. There was mild regurgitation. Mean transmitral gradient was 5.66 mmHg. Aortic valve: The valve was probably trileaflet. Leaflets exhibited  moderately to markedly increased thickness and mildly reduced cuspal  separation. Transaortic velocity was increased due to valvular stenosis. There was moderate stenosis.  Valve mean gradient was 24.45 mmHg.  Tricuspid valve: There was moderate regurgitation. Pulmonary artery  systolic pressure: 36 mmHg. Pulmonic valve: There was mild regurgitation. ASSESSMENT:  Hospital Problems  Date Reviewed: 8/3/2017          Codes Class Noted POA    GI bleed ICD-10-CM: K92.2  ICD-9-CM: 578.9  9/9/2017 Unknown        Generalized weakness ICD-10-CM: R53.1  ICD-9-CM: 780.79  9/9/2017 Unknown        Acute febrile illness ICD-10-CM: R50.9  ICD-9-CM: 780.60  9/9/2017 Unknown        Sepsis ([facility] ICD-10-CM: A41.9  ICD-9-CM: 038.9, 995.91  9/9/2017 Unknown        Anemia ICD-10-CM: D64.9  ICD-9-CM: 285.9  5/20/2014 Yes        Chronic diastolic heart failure ([facility] ICD-10-CM: I50.32  ICD-9-CM: 428.32  Unknown Yes    Overview Addendum 2/3/2017 11:44 AM by Lee Whyte MD     2/17; 11/16; 8/16 [id number];   Stable symptoms;  11/16 much better chr edema             Chest pain, unspecified ICD-10-CM: R07.9  ICD-9-CM: 786.50  Unknown Yes    Overview Addendum 4/26/2013 12:16 PM by Lee Whyte MD     Likely musculoskeletal/pleurotoc; will Rx medically now and consider cath in future if needed             Chronic kidney disease, unspecified ICD-10-CM: N18.9  ICD-9-CM: 513. 9  Unknown Yes        Paroxysmal atrial fibrillation ([facility] ICD-10-CM: I48.0  ICD-9-CM: 427.31  Unknown Yes    Overview Addendum 2/3/2017 11:43 AM by Lee Whyte MD     2/17 occ palp but SR on EKG; 11/16 SR on rythmol  pt in SR since 3/13 atleast  5/14 amio d/thomas due to hyperthyroid; 4/15 nl TFT; 5/15; 10/15 SR on rhythmol  chr warfarin due to recurrent DVT; f/u INR PCP                     SUBJECTIVE:      Improved  Nausea   Improving SOB     OBJECTIVE:    VS:   Visit Vitals    /74 (BP 1 Location: Left arm, BP Patient Position: At rest)    Pulse 81    Temp 98.6 °F (37 °C)    Resp 19    Ht 5' 6\" (1.676 m)    Wt 79.2 kg (174 lb 8 oz)    SpO2 97%    Breastfeeding No    BMI 28.17 kg/m2         Intake/Output Summary (Last 24 hours) at 09/14/17 595 [facility] filed at 09/14/17 0219   Gross per 24 hour   Intake              240 ml   Output                0 ml   Net              240 ml     TELE: normal sinus rhythm    General: alert, well developed, dyspneic and pleasant  HENT: Normocephalic, atraumatic. Normal external eye. Neck :  JVD difficult to assess due to obesity, likely high  Cardiac:  regular rate and rhythm, S1, S2 normal, no S3 or S4, no click, no rub. High-pitched harsh midsystolic murmur is present with a grade of 3/6  at the apex radiating to the neck  Lungs: diminished bilaterally. Mild rales that are chronic   Abdomen: Soft, nontender, no masses  Extremities:  No c/c/e, peripheral pulses present      Labs: Results:       Chemistry Recent Labs      09/14/17 0400 09/13/17 0328 09/12/17 0400   GLU  76  92  83   NA  143  144  145   K  3.6  3.4*  3.5   CL  113*  112*  115*   CO2  23  24  22   BUN  20*  19*  20*   CREA  1.55*  1.77*  1.63*   CA  8.0*  8.3*  8.3*   AGAP  7  8  8   BUCR  13  11*  12      CBC w/Diff Recent Labs      09/14/17 0400 09/13/17 0328 09/12/17 0400   WBC  5.4  5.1  4.1*   RBC  3.65*  3.66*  3.57*   HGB  8.6*  8.8*  8.6*   HCT  29.9*  30.0*  29.3*   PLT  278  248  238   GRANS  59  55  50   LYMPH  30  34  37   EOS  1  1  2      Cardiac Enzymes No results for input(s): CPK, CKND1, ED in the last 72 hours. No lab exists for component: CKRMB, TROIP   Coagulation Recent Labs      09/14/17 0400 09/13/17 0328   PTP  35.8*  33.5*   INR  3.7*  3.4*       Lipid Panel Lab Results   Component Value Date/Time    Cholesterol, total 94 09/10/2017 03:33 AM    HDL Cholesterol 41 09/10/2017 03:33 AM    LDL, calculated 36.4 09/10/2017 03:33 AM    VLDL, calculated 16.6 09/10/2017 03:33 AM    Triglyceride 83 09/10/2017 03:33 AM    CHOL/HDL Ratio 2.3 09/10/2017 03:33 AM      BNP No results for input(s): BNPP in the last 72 hours.    Liver Enzymes No results for input(s): TP, ALB, TBIL, AP, SGOT, GPT in the last 72 hours.    No lab exists for component: DBIL   Thyroid Studies Lab Results   Component Value Date/Time    TSH 1.77 09/08/2017 09:38 PM              Liliana Beltran Chloe:[phone] supervised. I have independently evaluated and examined the patient. All relevant labs and testing data's are reviewed. Care plan discussed and updated after review.     Brittanie Morse MD

## 2017-09-14 NOTE — PROGRESS NOTES
[facility] Hospitalist Group  Progress Note    Patient: Amelia Im Age: 73 y.o. : 1945 MR#: [id number] SSN: [id number]  Date: 2017     Subjective:   Pt reports SOB is much improved. However, diarrhea still same. Assessment and Plan:   73year old female who presented on  with cc of weakness and fever.    -C.diff colitis- pt c/o multiple episodes of diarrhea for several months. Has had recent abx to treat UTI. No improvement in diarrhea despite po flagyl, will switch to oral vanc.    -Anemia-chronic. Heme + brown stool per ED physician report in the setting of C.diff colitis. S/p GI eval with recommendations of no endoscopy at this time. Pt of note on coumadin for DVT/afib  with therapeutic INR. S/p PRBC transfusion 1 unit. Will hold coumadin until stability of Hgb continues to be established. Transfuse PRN. -Recent UTI-s/p abx. -SHRAVAN improving cont to monitor, renally dose meds, avoid nephrotoxic drugs. Case discussed with nephrologist, current creat similar to baseline values. Will cont to monitor.  -History of Afib on coumadin propafenone INR=3.4  today. Will cont to hold coumadin until INR no longer supratheraputic and hgb stability continues to be established.  -Complaints of SOB likely multifactorial. Pt has history of mild to mod chronic pulm fibrosis thought likely to be secondary to her collagen vascular disease. She also has valvular disease  (mod AS and mod MS), mod pulm HTN, and likely exacerbated by anemia as documented above. CXR checked recently unrevealing. . Doubt PE given theraputic INR. Pulmonary recommendations noted, significant improvement today after additional dosing of lasix. transfuse PRN. -Complaints of chest pain s/p cards eval. Trop negative on bb statin. Appreciate input from cardiology. No further cardiac w/u if cp resolves with diuresis. -Failure to thrive- pt presented with cc of progressive weakness.  Likely multifactorial. PT/OT consulted. Dispo: pt declines SNF placement if that is presented as an option. She wants to go home.             ABIMAEL wilson Notes:      Case discussed with:  [x]Patient  [x]Family  [x]Nursing  []Case Management  DVT Prophylaxis:  []Lovenox  []Hep SQ  [x]SCDs  []Coumadin   []On Heparin gtt    Objective:   VS:   Visit Vitals    /76 (BP 1 Location: Left arm, BP Patient Position: At rest)    Pulse 76    Temp 98.2 °F (36.8 °C)    Resp 18    Ht 5' 6\" (1.676 m)    Wt 79.6 kg (175 lb 6.4 oz)    SpO2 97%    Breastfeeding No    BMI 28.31 kg/m2      Tmax/24hrs: Temp (24hrs), Av °F (37.2 °C), Min:98.2 °F (36.8 °C), Max:99.3 °F (37.4 °C)      Intake/Output Summary (Last 24 hours) at 17 2234  Last data filed at 17 2109   Gross per 24 hour   Intake              120 ml   Output                0 ml   Net              120 ml       General:  Alert, awake, in NAD  Cardiovascular:  Rrr, no murmurs  Pulmonary:  ctab  GI:  Soft, nt, nd, +bs  Extremities:  No edema  Additional:      Labs:    Recent Results (from the past 24 hour(s))   PROTHROMBIN TIME + INR    Collection Time: 17  3:28 AM   Result Value Ref Range    Prothrombin time 33.5 (H) 11.5 - 15.2 sec    INR 3.4 (H) 0.8 - 1.2     METABOLIC PANEL, BASIC    Collection Time: 17  3:28 AM   Result Value Ref Range    Sodium 144 136 - 145 mmol/L    Potassium 3.4 (L) 3.5 - 5.5 mmol/L    Chloride 112 (H) 100 - 108 mmol/L    CO2 24 21 - 32 mmol/L    Anion gap 8 3.0 - 18 mmol/L    Glucose 92 74 - 99 mg/dL    BUN 19 (H) 7.0 - 18 MG/DL    Creatinine 1.77 (H) 0.6 - 1.3 MG/DL    BUN/Creatinine ratio 11 (L) 12 - 20      GFR est AA 34 (L) >60 ml/min/1.73m2    GFR est non-AA 28 (L) >60 ml/min/1.73m2    Calcium 8.3 (L) 8.5 - 10.1 MG/DL   NT-PRO BNP    Collection Time: 17  3:28 AM   Result Value Ref Range    NT pro-BNP 07120 (H) 0 - 900 PG/ML   CBC WITH AUTOMATED DIFF    Collection Time: 17  3:28 AM   Result Value Ref Range    WBC 5.1 4.6 - 13.2 K/uL    RBC 3.66 (L) 4.20 - 5.30 M/uL    HGB 8.8 (L) 12.0 - 16.0 g/dL    HCT 30.0 (L) 35.0 - 45.0 %    MCV 82.0 74.0 - 97.0 FL    MCH 24.0 24.0 - 34.0 PG    MCHC 29.3 (L) 31.0 - 37.0 g/dL    RDW 18.8 (H) 11.6 - 14.5 %    PLATELET 717 379 - 185 K/uL    MPV 9.3 9.2 - 11.8 FL    NEUTROPHILS 55 40 - 73 %    LYMPHOCYTES 34 21 - 52 %    MONOCYTES 10 3 - 10 %    EOSINOPHILS 1 0 - 5 %    BASOPHILS 0 0 - 2 %    ABS. NEUTROPHILS 2.8 1.8 - 8.0 K/UL    ABS. LYMPHOCYTES 1.7 0.9 - 3.6 K/UL    ABS. MONOCYTES 0.5 0.05 - 1.2 K/UL    ABS. EOSINOPHILS 0.1 0.0 - 0.4 K/UL    ABS.  BASOPHILS 0.0 0.0 - 0.1 K/UL    DF AUTOMATED         Signed By: Jenni Marrero MD     September 13, 2017 10:28 PM

## 2017-09-14 NOTE — PROGRESS NOTES
Bedside and Verbal shift change report given to Rafia RN (oncoming nurse) by Inna Parsons RN (offgoing nurse). Report included the following information SBAR, Kardex, MAR and Recent Results. SITUATION:   · Code Status: Full Code  · Reason for Admission: GI bleed  · Acute febrile illness  · Generalized weakness  · Sepsis ([facility] [address]     · Hospital day: 5  · Problem List:   [facility] Problems  Date Reviewed: 8/3/2017                         Codes Class Noted POA                GI bleed ICD-10-CM: K92.2  ICD-9-CM: 726. 9   9/9/2017 Unknown             Generalized weakness ICD-10-CM: R53.1  ICD-9-CM: 780.79   9/9/2017 Unknown             Acute febrile illness ICD-10-CM: R50.9  ICD-9-CM: 780.60   9/9/2017 Unknown             Sepsis ([facility] [address] ICD-10-CM: A41.9  ICD-9-CM: 038.9, 995.91   9/9/2017 Unknown             Anemia ICD-10-CM: D64.9  ICD-9-CM: 720. 9   5/20/2014 Yes             Chronic diastolic heart failure ([facility] [address] ICD-10-CM: I50.32  ICD-9-CM: 428.32   Unknown Yes      Overview Addendum 2/3/2017 11:44 AM by Drake Grimm MD        2/17; 11/16; 8/16 HRFU5;   Stable symptoms;  11/16 much better chr edema                Chest pain, unspecified ICD-10-CM: R07.9  ICD-9-CM: 786.50   Unknown Yes      Overview Addendum 4/26/2013 12:16 PM by Drake Grimm MD        Likely musculoskeletal/pleurotoc; will Rx medically now and consider cath in future if needed                Chronic kidney disease, unspecified ICD-10-CM: N18.9  ICD-9-CM: 521. 9   Unknown Yes             Paroxysmal atrial fibrillation (HCC) ICD-10-CM: I48.0  ICD-9-CM: 427.31   Unknown Yes      Overview Addendum 2/3/2017 11:43 AM by Drake Grimm MD        2/17 occ palp but SR on EKG; 11/16 SR on rythmol  pt in SR since 3/13 atleast  5/14 amio d/thomas due to hyperthyroid; 4/15 nl TFT; 5/15; 10/15 SR on rhythmol  chr warfarin due to recurrent DVT; f/u INR PCP                       BACKGROUND:                         Past Medical History:        Past Medical History:   Diagnosis Date    A-fib [facility])      Aortic valve disorders 8/31/2015     7/15 mild AS     Arrhythmia       a-fib     Arthritis      Autoimmune disease ([facility] [address]       lupus 2001  Reynauds disease  Sjogrens    CAD (coronary artery disease)       AFIB  2011 Dr Arlene Lennox Chest pain, unspecified       Poss atyp angina, will Rx medically now and consider cath in future if needed    Chronic diastolic heart failure (HCC)       Stable symptoms    Chronic kidney disease      Chronic kidney disease, unspecified      Chronic venous embolism and thrombosis of unspecified deep vessels of lower extremity       8/11    Congestive heart failure, unspecified      Edema       improving    Essential hypertension      Hyperlipidemia      Hypertension      Lupus (HCC)      Mitral regurgitation and mitral stenosis 10/29/2015     7/15 mild     Mitral valve disorders 8/31/2015     7/15 mild MS/MR     Pancytopenia (HCC)      Paroxysmal atrial fibrillation (HCC)      Rheumatoid arthritis ([facility] [address]                                  Patient taking anticoagulants no      ASSESSMENT:   ¨ Changes in Assessment Throughout Shift: none     ¨ Patient has Central Line: no Reasons if yes:   ¨ Patient has Epperson Cath: no Reasons if yes:       ¨ Last Vitals:             Vitals:     09/13/17 2000 09/14/17 0000 09/14/17 0400 09/14/17 0512   BP: 146/76 144/72 136/70     Pulse: 76 76 76     Resp: 18 18 18     Temp: 98.2 °F (36.8 °C) 99.5 °F (37.5 °C) 99.4 °F (37.4 °C)     SpO2: 97% 96% 97%     Weight:       79.2 kg (174 lb 8 oz)   Height:                 ¨ IV and DRAINS (will only show if present)                        [REMOVED] Peripheral IV 09/09/17 Left Hand-Site Assessment: Clean, dry, & intact  [REMOVED] Peripheral IV 09/08/17 Left Antecubital-Site Assessment: Clean, dry, & intact  [REMOVED] Peripheral IV 09/10/17 Right Forearm-Site Assessment: Clean, dry, & intact  Peripheral IV 09/14/17-Site Assessment: Clean, dry, & intact     ¨ WOUND (if present)                        Wound Type:  none                        Dressing present Dressing Present : No                        Wound Concerns/Notes:  none     · PAIN                                              Pain Assessment                                              Pain Intensity 1: 0 (09/14/17 0400)                                                                                                                                            Patient Stated Pain Goal: 0  ¨ Interventions for Pain:  NO STATED PAIN  ¨ Intervention effective: YES  ¨ Time of last intervention:  Denied pain. ¨ Reassessment Completed: YES      · Last 3 Weights:        Last 3 Recorded Weights in this Encounter     09/11/17 1035 09/12/17 2118 09/14/17 0512   Weight: 79.4 kg (175 lb) 79.6 kg (175 lb 6.4 oz) 79.2 kg (174 lb 8 oz)      Weight change: -0.408 kg (-14.4 oz)     · INTAKE/OUPUT                                              Current Shift:                                                Last three shifts: 09/12 1901 - 09/14 0700  In: 240 [P.O.:240]  Out: -      · LAB RESULTS            Recent Labs       09/14/17   0400  09/13/17   0328  09/12/17   0400   WBC  5.4  5.1  4.1*   HGB  8.6*  8.8*  8.6*   HCT  29.9*  30.0*  29.3*   PLT  278  248  238                Recent Labs       09/14/17   0400  09/13/17   0328  09/12/17   0400   NA  143  144  145   K  3.6  3.4*  3.5   GLU  76  92  83   BUN  20*  19*  20*   CREA  1.55*  1.77*  1.63*   CA  8.0*  8.3*  8.3*   INR  3.7*  3.4*  3.0*         RECOMMENDATIONS AND DISCHARGE PLANNING      1. Pending tests/procedures/ Plan of Care or Other Needs: Pending Echo;Possible repeat of Nuclear Stress Test      2. Discharge plan for patient and Needs/Barriers: TBD     3. Estimated Discharge Date: 9/15/17 Posted on Whiteboard in Patients Room: yes       4.  The patient's care plan was reviewed with the oncoming nurse.       \"HEALS\" SAFETY CHECK                            Fall Risk                         Total Score: 4                         Safety Measures: Safety Measures: Bed/Chair alarm on, Bed/Chair-Wheels locked, Bed in low position, Call light within reach, Fall prevention (comment), Gripper socks, Side rails X 3     A safety check occurred in the patient's room between off going nurse and oncoming nurse listed above.     The safety check included the below items  Area Items   H  High Alert Medications § Verify all high alert medication drips (heparin, PCA, etc.)   E  Equipment § Suction is set up for ALL patients (with tiera)  § Red plugs utilized for all equipment (IV pumps, etc.)  § WOWs wiped down at end of shift. § Room stocked with oxygen, suction, and other unit-specific supplies   A  Alarms § Bed alarm is set for fall risk patients  § Ensure chair alarm is in place and activated if patient is up in a chair   L  Lines § Check IV for any infiltration  § Epperson bag is empty if patient has a Epperson   § Tubing and IV bags are labeled   S  Safety § Room is clean, patient is clean, and equipment is clean. § Hallways are clear from equipment besides carts. § Fall bracelet on for fall risk patients  § Ensure room is clear and free of clutter  § Suction is set up for ALL patients (with tiera)  § Hallways are clear from equipment besides carts.    § Isolation precautions followed, supplies available outside room, sign posted

## 2017-09-14 NOTE — PROGRESS NOTES
Problem: Mobility Impaired (Adult and Pediatric)  Goal: *Acute Goals and Plan of Care (Insert Text)  Physical Therapy Goals  Initiated 9/12/2017 and to be accomplished within 7 day(s)  1. Patient will move from supine to sit and sit to supine in bed with modified independence. 2. Patient will transfer from bed to chair and chair to bed with supervision/set-up using the least restrictive device. 3. Patient will perform sit to stand with supervision/set-up. 4. Patient will ambulate with supervision/set-up for 100 feet with the least restrictive device. Outcome: Progressing Towards Goal  PHYSICAL THERAPY TREATMENT     Patient: Paulo Robles (20 y.o. female)  Date: 9/14/2017  Diagnosis: GI bleed  Acute febrile illness  Generalized weakness  Sepsis ([facility] [address] <principal problem not specified>       Precautions: Contact, Fall  Chart, physical therapy assessment, plan of care and goals were reviewed. ASSESSMENT:  Pt rolled in bed several times with supervision for pericare to be performed after having a bowel movement. Pt transferred supine to sit with min A. Pt sat edge of bed for approximately 15 minutes before agreeing to attempt standing. Pt stood with min/mod A from the edge of the bed to the walker. Pt ambulate 3 feet with min A and a walker and then stated that she was too weak to go on. Pt returned to sitting edge of bed and then performed stand pivot transfer to the recliner with min/mod A. Progression toward goals:  [ ]      Improving appropriately and progressing toward goals  [X]      Improving slowly and progressing toward goals  [ ]      Not making progress toward goals and plan of care will be adjusted       PLAN:  Patient continues to benefit from skilled intervention to address the above impairments. Continue treatment per established plan of care.   Discharge Recommendations:  Chinedu Gregory  Further Equipment Recommendations for Discharge:  N/A       G-CODES:      Mobility  Current  CK= 40-59%. The severity rating is based on the Level of Assistance required for Functional Mobility and ADLs. SUBJECTIVE:   Patient stated I don't want to do this.       OBJECTIVE DATA SUMMARY:   Critical Behavior:  Neurologic State: Alert  Orientation Level: Oriented X4  Cognition: Appropriate decision making, Appropriate safety awareness, Follows commands  Safety/Judgement: Awareness of environment, Fall prevention  Functional Mobility Training:  Bed Mobility:  Supine to Sit: Minimum assistance  Transfers:  Sit to Stand: Minimum assistance; Moderate assistance  Stand to Sit: Minimum assistance; Moderate assistance  Bed to Chair: Minimum assistance; Moderate assistance     Balance:  Sitting - Static: Fair (occasional)  Sitting - Dynamic:  (fair-)  Standing: With support  Standing - Static:  (fair-)  Standing - Dynamic :  (fair-/poor+)  Ambulation/Gait Training:  Distance (ft): 3 Feet (ft)  Assistive Device: Walker, rolling  Ambulation - Level of Assistance: Minimal assistance  Gait Abnormalities: Decreased step clearance  Therapeutic Exercises: Ankle pumps  Pain:  Pt reports 2/10 pain or discomfort prior to treatment. Pt reports 2/10 pain or discomfort post treatment. Activity Tolerance:   poor  Please refer to the flowsheet for vital signs taken during this treatment.   After treatment:   [X] Patient left in no apparent distress sitting up in chair  [ ] Patient left in no apparent distress in bed  [X] Call bell left within reach  [X] Nursing notified  [X] Caregiver present  [ ] Bed alarm activated    Educated patient on the importance of increasing activity with assistance throughout the day to increase strength and activity tolerance     Opal Canavan, PT   Time Calculation: 40 mins

## 2017-09-14 NOTE — ROUTINE PROCESS
Bedside and Verbal shift change report given to Maybell Meigs, RN (oncoming nurse) by Maite Peter RN (offgoing nurse). Report included the following information SBAR, Kardex, MAR and Recent Results. SITUATION:    Code Status: Full Code  Reason for Admission: GI bleed  Acute febrile illness  Generalized weakness   Sepsis ([facility] [address]    [facility] day: 5   Problem List:       Hospital Problems  Date Reviewed: 8/3/2017          Codes Class Noted POA    GI bleed ICD-10-CM: K92.2  ICD-9-CM: 578.9  9/9/2017 Unknown        Generalized weakness ICD-10-CM: R53.1  ICD-9-CM: 780.79  9/9/2017 Unknown        Acute febrile illness ICD-10-CM: R50.9  ICD-9-CM: 780.60  9/9/2017 Unknown        Sepsis ([facility] [address] ICD-10-CM: A41.9  ICD-9-CM: 038.9, 995.91  9/9/2017 Unknown        Anemia ICD-10-CM: D64.9  ICD-9-CM: 285.9  5/20/2014 Yes        Chronic diastolic heart failure ([facility] [address] ICD-10-CM: I50.32  ICD-9-CM: 428.32  Unknown Yes    Overview Addendum 2/3/2017 11:44 AM by Amanda Carreno MD     2/17; 11/16; 8/16 HCXM2;   Stable symptoms;  11/16 much better chr edema             Chest pain, unspecified ICD-10-CM: R07.9  ICD-9-CM: 786.50  Unknown Yes    Overview Addendum 4/26/2013 12:16 PM by Amanda Carreno MD     Likely musculoskeletal/pleurotoc; will Rx medically now and consider cath in future if needed             Chronic kidney disease, unspecified ICD-10-CM: N18.9  ICD-9-CM: 736. 9  Unknown Yes        Paroxysmal atrial fibrillation (HCC) ICD-10-CM: I48.0  ICD-9-CM: 427.31  Unknown Yes    Overview Addendum 2/3/2017 11:43 AM by Amanda Carreno MD     2/17 occ palp but SR on EKG; 11/16 SR on rythmol  pt in SR since 3/13 atleast  5/14 amio d/thomas due to hyperthyroid; 4/15 nl TFT; 5/15; 10/15 SR on rhythmol  chr warfarin due to recurrent DVT; f/u INR PCP                   BACKGROUND:    Past Medical History:   Past Medical History:   Diagnosis Date    [facility])     Aortic valve disorders 8/31/2015    7/15 mild AS     Arrhythmia     a-fib  Arthritis     Autoimmune disease ([facility] [address]     lupus 2001  Reynauds disease  Sjogrens    CAD (coronary artery disease)     AFIB  2011 Dr Deshawn Colmenares Chest pain, unspecified     Poss atyp angina, will Rx medically now and consider cath in future if needed    Chronic diastolic heart failure (HCC)     Stable symptoms    Chronic kidney disease     Chronic kidney disease, unspecified     Chronic venous embolism and thrombosis of unspecified deep vessels of lower extremity     8/11    Congestive heart failure, unspecified     Edema     improving    Essential hypertension     Hyperlipidemia     Hypertension     Lupus ([facility] [address]     Mitral regurgitation and mitral stenosis 10/29/2015    7/15 mild     Mitral valve disorders 8/31/2015    7/15 mild MS/MR     Pancytopenia (HCC)     Paroxysmal atrial fibrillation (HCC)     Rheumatoid arthritis ([facility] [address]          Patient taking anticoagulants no     ASSESSMENT:    Changes in Assessment Throughout Shift: none     Patient has Central Line: no Reasons if yes:    Patient has Epperson Cath: no Reasons if yes:       Last Vitals:     Vitals:    09/13/17 2000 09/14/17 0000 09/14/17 0400 09/14/17 0512   BP: 146/76 144/72 136/70    Pulse: 76 76 76    Resp: 18 18 18    Temp: 98.2 °F (36.8 °C) 99.5 °F (37.5 °C) 99.4 °F (37.4 °C)    SpO2: 97% 96% 97%    Weight:    79.2 kg (174 lb 8 oz)   Height:            IV and DRAINS (will only show if present)   [REMOVED] Peripheral IV 09/09/17 Left Hand-Site Assessment: Clean, dry, & intact  [REMOVED] Peripheral IV 09/08/17 Left Antecubital-Site Assessment: Clean, dry, & intact  [REMOVED] Peripheral IV 09/10/17 Right Forearm-Site Assessment: Clean, dry, & intact  Peripheral IV 09/14/17-Site Assessment: Clean, dry, & intact     WOUND (if present)   Wound Type:  none   Dressing present Dressing Present : No   Wound Concerns/Notes:  none     PAIN    Pain Assessment    Pain Intensity 1: 0 (09/14/17 0400)              Patient Stated Pain Goal: 0  o Interventions for Pain:  NO STATED PAIN  o Intervention effective: YES  o Time of last intervention:  Denied pain. o Reassessment Completed: YES      Last 3 Weights:  Last 3 Recorded Weights in this Encounter    09/11/17 1035 09/12/17 2118 09/14/17 0512   Weight: 79.4 kg (175 lb) 79.6 kg (175 lb 6.4 oz) 79.2 kg (174 lb 8 oz)     Weight change: -0.408 kg (-14.4 oz)     INTAKE/OUPUT    Current Shift:      Last three shifts: 09/12 1901 - 09/14 0700  In: 240 [P.O.:240]  Out: -      LAB RESULTS     Recent Labs      09/14/17   0400  09/13/17 0328 09/12/17   0400   WBC  5.4  5.1  4.1*   HGB  8.6*  8.8*  8.6*   HCT  29.9*  30.0*  29.3*   PLT  278  248  238        Recent Labs      09/14/17   0400  09/13/17 0328 09/12/17   0400   NA  143  144  145   K  3.6  3.4*  3.5   GLU  76  92  83   BUN  20*  19*  20*   CREA  1.55*  1.77*  1.63*   CA  8.0*  8.3*  8.3*   INR  3.7*  3.4*  3.0*       RECOMMENDATIONS AND DISCHARGE PLANNING     1. Pending tests/procedures/ Plan of Care or Other Needs: Pending Echo;Possible repeat of Nuclear Stress Test     2. Discharge plan for patient and Needs/Barriers: TBD    3. Estimated Discharge Date: 9/15/17 Posted on Whiteboard in [facility]: yes      4. The patient's care plan was reviewed with the oncoming nurse. \"HEALS\" SAFETY CHECK      Fall Risk    Total Score: 4    Safety Measures: Safety Measures: Bed/Chair alarm on, Bed/Chair-Wheels locked, Bed in low position, Call light within reach, Fall prevention (comment), Gripper socks, Side rails X 3    A safety check occurred in the patient's room between off going nurse and oncoming nurse listed above.     The safety check included the below items  Area Items   H  High Alert Medications - Verify all high alert medication drips (heparin, PCA, etc.)   E  Equipment - Suction is set up for ALL patients (with michealker)  - Red plugs utilized for all equipment (IV pumps, etc.)  - WOWs wiped down at end of shift.  - Room stocked with oxygen, suction, and other unit-specific supplies   A  Alarms - Bed alarm is set for fall risk patients  - Ensure chair alarm is in place and activated if patient is up in a chair   L  Lines - Check IV for any infiltration  - Epperson bag is empty if patient has a Epperson   - Tubing and IV bags are labeled   S  Safety   - Room is clean, patient is clean, and equipment is clean. - Hallways are clear from equipment besides carts. - Fall bracelet on for fall risk patients  - Ensure room is clear and free of clutter  - Suction is set up for ALL patients (with tiera)  - Hallways are clear from equipment besides carts.    - Isolation precautions followed, supplies available outside room, sign posted     Sanam Medina RN

## 2017-09-14 NOTE — PROGRESS NOTES
Problem: Falls - Risk of  Goal: *Absence of Falls  Document Issa Fall Risk and appropriate interventions in the flowsheet.    Outcome: Progressing Towards Goal  Fall Risk Interventions:  Mobility Interventions: Bed/chair exit alarm           Medication Interventions: Bed/chair exit alarm     Elimination Interventions: Call light in reach     History of Falls Interventions: Bed/chair exit alarm

## 2017-09-15 NOTE — PROGRESS NOTES
Bedside and Verbal shift change report given to   Rafia (oncoming nurse) by Franklin Perez,[address]). Report included the following information SBAR, Kardex, MAR and Recent Results.      SITUATION: ·   Code Status: Full Code  · Reason for Admission: GI bleed  · Acute febrile illness  · Generalized weakness  · Sepsis ([facility]      · Hospital day: 5  · Problem List:   [place] Date Reviewed: 8/3/2017                                     Codes Class Noted POA                          GI bleed ICD-10-CM: K92.2  ICD-9-CM: 388. 9    9/9/2017 Unknown                  Generalized weakness ICD-10-CM: R53.1  ICD-9-CM: 780.79    9/9/2017 Unknown                  Acute febrile illness ICD-10-CM: R50.9  ICD-9-CM: 780.60    9/9/2017 Unknown                  Sepsis ([facility] [address] ICD-10-CM: A41.9  ICD-9-CM: 038.9, 995.91    9/9/2017 Unknown                  Anemia ICD-10-CM: D64.9  ICD-9-CM: 110. 9    5/20/2014 Yes                  Chronic diastolic heart failure (HCC) ICD-10-CM: I50.32  ICD-9-CM: 428.32    Unknown Yes        Overview Addendum 2/3/2017 11:44 AM by Cathy Kennedy MD           2/17; 11/16; 8/16 OLWD4;   Stable symptoms;  11/16 much better chr edema                      Chest pain, unspecified ICD-10-CM: R07.9  ICD-9-CM: 786.50    Unknown Yes        Overview Addendum 4/26/2013 12:16 PM by Cathy Kennedy MD           Likely musculoskeletal/pleurotoc; will Rx medically now and consider cath in future if needed                      Chronic kidney disease, unspecified ICD-10-CM: N18.9  ICD-9-CM: 224. 9    Unknown Yes                  Paroxysmal atrial fibrillation (HCC) ICD-10-CM: I48.0  ICD-9-CM: 427.31    Unknown Yes        Overview Addendum 2/3/2017 11:43 AM by Cathy Kennedy MD           2/17 occ palp but SR on EKG; 11/16 SR on rythmol  pt in SR since 3/13 atleast  5/14 amio d/thomas due to hyperthyroid; 4/15 nl TFT; 5/15; 10/15 SR on rhythmol  chr warfarin due to recurrent DVT; f/u INR PCP                              BACKGROUND:                         Past Medical History:           Past Medical History:   Diagnosis Date    A-fib ([address]       Aortic valve disorders 8/31/2015      7/15 mild AS     Arrhythmia         a-fib     Arthritis       Autoimmune disease ([address]         lupus 2001  Reynauds disease  Sjogrens    CAD (coronary artery disease)         AFIB  2011 Dr Sophie Scott Chest pain, unspecified         Poss atyp angina, will Rx medically now and consider cath in future if needed    Chronic diastolic heart failure ([address]         Stable symptoms    Chronic kidney disease       Chronic kidney disease, unspecified       Chronic venous embolism and thrombosis of unspecified deep vessels of lower extremity         8/11    Congestive heart failure, unspecified       Edema         improving    Essential hypertension       Hyperlipidemia       Hypertension       Lupus ([address]       Mitral regurgitation and mitral stenosis 10/29/2015      7/15 mild     Mitral valve disorders 8/31/2015      7/15 mild MS/MR     Pancytopenia (HCC)       Paroxysmal atrial fibrillation (HCC)       Rheumatoid arthritis ([address]                                    Patient taking anticoagulants no       ASSESSMENT: ¨   Changes in Assessment Throughout Shift: none      ¨ Patient has Central Line: no Reasons if yes:   ¨ Patient has Epperson Cath: no Reasons if yes:        ¨ Last Vitals:                  Vitals:      09/13/17 2000 09/14/17 0000 09/14/17 0400 09/14/17 0512   BP: 146/76 144/72 136/70      Pulse: 76 76 76      Resp: 18 18 18      Temp: 98.2 °F (36.8 °C) 99.5 °F (37.5 °C) 99.4 °F (37.4 °C)      SpO2: 97% 96% 97%      Weight:          79.2 kg (174 lb 8 oz)   Height:                       ¨ IV and DRAINS (will only show if present)                        [REMOVED] Peripheral IV 09/09/17 Left Hand-Site Assessment: Clean, dry, & intact  [REMOVED] Peripheral IV 09/08/17 Left Antecubital-Site Assessment: Clean, dry, & intact  [REMOVED] Peripheral IV 09/10/17 Right Forearm-Site Assessment: Clean, dry, & intact  Peripheral IV 09/14/17-Site Assessment: Clean, dry, & intact      ¨ WOUND (if present)                        Wound Type:  none                        Dressing present Dressing Present : No                        Wound Concerns/Notes:  none      · PAIN                                              Pain Assessment                                              Pain Intensity 1: 0 (09/14/17 0400)                                                    Patient Stated Pain Goal: 0  ¨ Interventions for Pain:  NO STATED PAIN  ¨ Intervention effective: YES  ¨ Time of last intervention:  Denied pain. ¨ Reassessment Completed: YES       · Last 3 Weights:            Last 3 Recorded Weights in this Encounter      09/11/17 1035 09/12/17 2118 09/14/17 0512   Weight: 79.4 kg (175 lb) 79.6 kg (175 lb 6.4 oz) 79.2 kg (174 lb 8 oz)       Weight change: -0.408 kg (-14.4 oz)      · INTAKE/OUPUT                                              Current Shift:                                                Last three shifts: 09/12 1901 - 09/14 0700  In: 240 [P.O.:240]  Out: -       · LAB RESULTS                Recent Labs       09/14/17   0400  09/13/17   0328  09/12/17   0400   WBC  5.4  5.1  4.1*   HGB  8.6*  8.8*  8.6*   HCT  29.9*  30.0*  29.3*   PLT  278  248  238                     Recent Labs       09/14/17   0400  09/13/17   0328  09/12/17   0400   NA  143  144  145   K  3.6  3.4*  3.5   GLU  76  92  83   BUN  20*  19*  20*   CREA  1.55*  1.77*  1.63*   CA  8.0*  8.3*  8.3*   INR  3.7*  3.4*  3.0*           RECOMMENDATIONS AND DISCHARGE PLANNING       1. Pending tests/procedures/ Plan of Care or Other Needs: Pending Echo;Possible repeat of Nuclear Stress Test       2. Discharge plan for patient and Needs/Barriers: TBD      3. Estimated Discharge Date: 9/15/17 Posted on Whiteboard in [facility]: yes        4.  The patient's care plan was reviewed with the oncoming nurse.       \"HEALS\" SAFETY CHECK                             Fall Risk                         Total Score: 4                         Safety Measures: Safety Measures: Bed/Chair alarm on, Bed/Chair-Wheels locked, Bed in low position, Call light within reach, Fall prevention (comment), Gripper socks, Side rails X 3      A safety check occurred in the patient's room between off going nurse and oncoming nurse listed above.      The safety check included the below items  Area Items   H  High Alert Medications § Verify all high alert medication drips (heparin, PCA, etc.)   E  Equipment § Suction is set up for ALL patients (with tiera)  § Red plugs utilized for all equipment (IV pumps, etc.)  § WOWs wiped down at end of shift. § Room stocked with oxygen, suction, and other unit-specific supplies   A  Alarms § Bed alarm is set for fall risk patients  § Ensure chair alarm is in place and activated if patient is up in a chair   L  Lines § Check IV for any infiltration  § Epperson bag is empty if patient has a Epperson   § Tubing and IV bags are labeled   S  Safety § Room is clean, patient is clean, and equipment is clean. § Hallways are clear from equipment besides carts. § Fall bracelet on for fall risk patients  § Ensure room is clear and free of clutter  § Suction is set up for ALL patients (with tiera)  § Hallways are clear from equipment besides carts.    § Isolation precautions followed, supplies available outside room, sign posted

## 2017-09-15 NOTE — DISCHARGE SUMMARY
[facility]  Discharge Summary       Patient: Ana Bolanos Age: 73 y.o. : 1945 MR#: [id number] SSN: [id number]  PCP on record: Marcio Dorantes MD  Admit date: 2017  Discharge date: 9/15/2017    Consults:  Dr. Matthew Che-Cardiology  Dr. Nuzhat Mazariegos  -   Procedures: Note  -     Significant Diagnostic Studies: none  -    Discharge Diagnoses:    C.diff colitis-improving   Acute on Chronic diastolic heart failure-improved  SHRAVAN-improved  Hypokalemia-resolved  Anemia-improved and stable. Coumadin coagulopathy-INR continues to trend up. GI bleed in the setting of C.diff colitis     Failure to thrive.                                    Patient Active Problem List   Diagnosis Code    Chronic diastolic heart failure ([facility]) I50.32    Chest pain, unspecified R07.9    Essential hypertension, benign I10    Edema R60.9    Chronic kidney disease, unspecified N18.9    Lupus ([facility] [address] [address] M32.9    Chronic venous embolism and thrombosis of deep vessels of lower extremity ([facility]) I82.509    Rheumatoid arthritis ([facility]) M06.9    Paroxysmal atrial fibrillation ([facility]) I48.0    SOB (shortness of breath) R06.02    Chronic kidney disease N18.9    Arthritis M19.90    Hypertension I10    Autoimmune disease ([facility] [address] M35.9    Memory loss R41.3    Ataxic gait R26.0    Polyneuropathy ([facility]) G62.9    Recurrent falls R29.6    Lupus (systemic lupus erythematosus) ([facility]) M32.9    Sjogren's syndrome ([facility]) M35.00    High risk medication use Z79.899    Pancytopenia ([facility]) D61.818    Lumbosacral spinal stenosis M48.07    Spinal stenosis of lumbosacral region M48.07    Other and unspecified angina pectoris I20.9    Coronary atherosclerosis of native coronary artery I25.10    Anemia D64.9    DVT (deep venous thrombosis) ([facility]) I82.409    Weakness R53.1    Aortic stenosis I35.0    Mitral regurgitation and mitral stenosis I05.2    Pulmonary HTN ([facility]) I27.2    Hyperlipidemia E78.5    Acute on chronic diastolic congestive heart failure (HCC) I50.33    GI bleed K92.2    Generalized weakness R53.1    Acute febrile illness R50.9    Sepsis [facility]) A41.9       Hospital Course by Problem     73year old female who presented on 09/09 with cc of weakness and fever.     -C. diff colitis- pt c/o multiple episodes of diarrhea for several months. Has had recent abx to treat UTI. Will cont PO vanc as pt reports improved symptoms. Will need a total of 10 days vancomycin oral. Has received dosing for three days.  -Anemia-chronic. Heme + brown stool per ED physician report in the setting of C.diff colitis. S/p GI eval with recommendations of no endoscopy at this time. Pt of note on coumadin for DVT/afib  with supratheraputic INR. S/p PRBC transfusion 1 unit. Recommend holding coumadin until stability of Hgb continues to be established. Transfuse PRN. -Recent UTI-s/p abx. -SHRAVAN improving cont to monitor, renally dose meds, avoid nephrotoxic drugs. Recommend nephrology consult if no improvement with these measures. .  -History of Afib on coumadimn propafenone supratheraputic INR continues despite holding coumadin, but no overt bleeding. Recommend continuing to hold coumadin until hgb stability continues to be established and until INR stops trending up and is below 3.  -Complaints of SOB likely multifactorial. Pt has history of mild to mod chronic pulm fibrosis thought likely to be secondary to her collagen vascular disease. She also has valvular disease  (mod AS and mod MS), mod pulm HTN, and likely exacerbated by anemia as documented above. CXR checked recently unrevealing. . Doubt PE given theraputic INR. Pulmonary recommendations noted. Has marked improvement with diuresis. swithced from IV to oral lasix today, recommend lasix daily while closely following her creatinine.  -Acute on chronic diastolic HF, and Complaints of chest pain s/p cardiology eval. Trop negative on bb statin. Appreciate input from cardiology. Cardiac w/u if recurrence of chest pain, worsening HF. Cont diuresis with lasix.  -Failure to thrive- pt presented with cc of progressive weakness. Likely multifactorial. PT/OT consulted. -Coagulopathy secondary to coumadin. Continuing to hold coumadin. No overt bleeding. Will cont to monitor. Today's examination of the patient revealed:     Subjective:   Pt reports decreased frequency of diarrhea, denies chest pain, sob improved.   Objective:   VS:   Visit Vitals    /77 (BP 1 Location: Left arm, BP Patient Position: At rest)    Pulse 77    Temp 99.2 °F (37.3 °C)    Resp 18    Ht 5' 6\" (1.676 m)    Wt 78.5 kg (173 lb)    SpO2 97%    Breastfeeding No    BMI 27.92 kg/m2      Tmax/24hrs: Temp (24hrs), Av.6 °F (37 °C), Min:97.6 °F (36.4 °C), Max:99.7 °F (37.6 °C)     Input/Output:   Intake/Output Summary (Last 24 hours) at 09/15/17 1746  Last data filed at 09/15/17 1553   Gross per 24 hour   Intake              100 ml   Output              350 ml   Net             -250 ml       General:  Alert, awake, in nad  Cardiovascular:  Rrr, no murmurs  Pulmonary:  ctab  GI:  Soft, nt,nd  Extremities:  No edema  Additional:      Labs:    Recent Results (from the past 24 hour(s))   PROTHROMBIN TIME + INR    Collection Time: 09/15/17  3:04 AM   Result Value Ref Range    Prothrombin time 40.5 (H) 11.5 - 15.2 sec    INR 4.3 (H) 0.8 - 1.2     METABOLIC PANEL, BASIC    Collection Time: 09/15/17  3:04 AM   Result Value Ref Range    Sodium 144 136 - 145 mmol/L    Potassium 3.6 3.5 - 5.5 mmol/L    Chloride 114 (H) 100 - 108 mmol/L    CO2 21 21 - 32 mmol/L    Anion gap 9 3.0 - 18 mmol/L    Glucose 68 (L) 74 - 99 mg/dL    BUN 20 (H) 7.0 - 18 MG/DL    Creatinine 1.58 (H) 0.6 - 1.3 MG/DL    BUN/Creatinine ratio 13 12 - 20      GFR est AA 39 (L) >60 ml/min/1.73m2    GFR est non-AA 32 (L) >60 ml/min/1.73m2    Calcium 8.2 (L) 8.5 - 10.1 MG/DL     Additional Data Reviewed: Condition:   Disposition:    []Home   []Home with Home Health   [x]SNF/NH   []Rehab   []Home with family   []Alternate Facility:____________________      Discharge Medications:     Current Discharge Medication List      CONTINUE these medications which have NOT CHANGED    Details   metoprolol tartrate (LOPRESSOR) 100 mg IR tablet Take 1 Tab by mouth two (2) times a day. Qty: 60 Tab, Refills: 1      loratadine (CLARITIN) 10 mg tablet Take 10 mg by mouth. rosuvastatin (CRESTOR) 40 mg tablet Take 1 Tab by mouth nightly. Qty: 90 Tab, Refills: 2    Associated Diagnoses: Mixed hyperlipidemia      propafenone (RYTHMOL) 225 mg tablet Take 1 Tab by mouth three (3) times daily. Qty: 270 Tab, Refills: 3      triamcinolone acetonide (KENALOG) 0.1 % ointment Apply  to affected area two (2) times a day. use thin layer   Indications: pt stopped      furosemide (LASIX) 40 mg tablet Take 1 Tab by mouth three (3) times daily. Take 2 tabs(80mg) in am and 1 tab 40 mg  at 2pm. May skip PM lasix if no edema/SOB  Qty: 90 Tab, Refills: 2    Associated Diagnoses: Chronic diastolic heart failure (HCC)      acetaminophen (TYLENOL) 325 mg tablet Take 650 mg by mouth every four (4) hours as needed for Pain. clotrimazole-betamethasone (LOTRISONE) topical cream Apply  to affected area two (2) times a day. Indications: pt stopped      amLODIPine (NORVASC) 5 mg tablet Take 1 Tab by mouth daily. Qty: 30 Tab, Refills: 3      Cholecalciferol, Vitamin D3, 1,000 unit cap Take 2,000 Units by mouth daily. loperamide (IMODIUM) 2 mg capsule Take  by mouth as needed. traMADol (ULTRAM) 50 mg tablet Take 50 mg by mouth every six (6) hours as needed for Pain.      gabapentin (NEURONTIN) 100 mg capsule Take 200 mg by mouth nightly. sertraline (ZOLOFT) 50 mg tablet Take 100 mg by mouth daily. isosorbide mononitrate ER (IMDUR) 60 mg CR tablet Take 60 mg by mouth every morning.                                famotidine (PEPCID) 40 mg tablet Take 1 Tab by mouth two (2) times a day. Qty: 60 Tab, Refills: 0           Hold:   norvasc 5 mg daily  Hydralazine 10 mg tid   azathioprine 50 mg bid  Coumadin  Prednisone      Follow-up Appointments:   1.  Your PCP: Rajani Martinez MD, within 7-10days              >30 minutes spent coordinating this discharge (review instructions/follow-up, prescriptions, preparing report for sign off)    Signed:  Long Taveras MD  9/15/2017  5:46 PM

## 2017-09-15 NOTE — PROGRESS NOTES
Pt reluctantly agreeable to SNF. Stated she wants to stay in [place]. Permission provided for ACP, SNP, and PHR. SW provided with verbal FOC c/o isolation for C. Diff. SW contacted Jesusa Goodell of [facility] who stated she does have an isolation room available. Patient placed in [facility] for review.

## 2017-09-15 NOTE — PROGRESS NOTES
Problem: Self Care Deficits Care Plan (Adult)  Goal: *Acute Goals and Plan of Care (Insert Text)  Occupational Therapy Goals  Initiated 9/13/2017 within 7 day(s). 1. Patient will perform grooming tasks at EOB with modified independence and fair+ dynamic sitting balance. 2. Patient will perform lower body dressing with modified independence utilizing AE, prn.  3. Patient will perform functional task in standing for 5 minutes with supervision for balance to increase activity tolerance for ADLs. 4. Patient will perform toilet transfers with supervision/set-up. 5. Patient will perform all aspects of toileting with supervision/set-up. 6. Patient will participate in upper extremity therapeutic exercise/activities with supervision/set-up for 8 minutes to increase BUE strength for ADLs. 7. Patient will utilize energy conservation techniques during functional activities with minimal verbal cues. Outcome: Progressing Towards Goal  OCCUPATIONAL THERAPY TREATMENT     Patient: Megan Quiñones (89 y.o. female)  Date: 9/15/2017  Diagnosis: GI bleed  Acute febrile illness  Generalized weakness  Sepsis ([address] <principal problem not specified>       Precautions: Contact, Fall  Chart, occupational therapy assessment, plan of care, and goals were reviewed. ASSESSMENT:  Pt required encouragement to participate in OT this afternoon. Pt is seen with PT to increase safety of the pt and staff during functional mobility and ADLs. Pt required Min A to maneuver to EOB in preparation for ADLs. Pt was able to simulate ADL grooming task w/Mod Ind seated EOB. Pt was educated on [facility] techniques for maneuvering in bed and for LB ADLs. Pt was able to doff/don socks w/Supervised A seated EOB following education. Pt participated in functional txfr to the chair for lunch, simulating txfr to [facility], requiring CGA and min VCs for hands placement for safe descend.  Pt was educated on UE TherEx including scapular strengthening TherEx in order to improve strength and overall activity tolerance for carryover w/ADLs. Pt left comfortable in the chair. Call bell left within reach. Progression toward goals:  [ ]          Improving appropriately and progressing toward goals  [X]          Improving slowly and progressing toward goals  [ ]          Not making progress toward goals and plan of care will be adjusted       PLAN:  Patient continues to benefit from skilled intervention to address the above impairments. Continue treatment per established plan of care. Discharge Recommendations:  Chinedu Gregory  Further Equipment Recommendations for Discharge:  bedside commode and shower chair      G-CODES:      Self Care  Current  CJ= 20-39%. The severity rating is based on the Level of Assistance required for Functional Mobility and ADLs. SUBJECTIVE:   Patient stated I am just feeling weak.       OBJECTIVE DATA SUMMARY:   Cognitive/Behavioral Status:  Neurologic State: Alert  Orientation Level: Oriented X4  Cognition: Follows commands  Safety/Judgement: Awareness of environment, Fall prevention     Functional Mobility and Transfers for ADLs:              Bed Mobility:     Supine to Sit: Minimum assistance              Transfers:  Sit to Stand: Contact guard assistance;Minimum assistance     Bed to Chair: Contact guard assistance (w/FWW)              Toilet Transfer : Contact guard assistance (simulated w/FWW)               Balance:  Sitting: Impaired; With support  Sitting - Static: Good (unsupported)  Sitting - Dynamic: Fair (occasional)  Standing: Impaired; With support  Standing - Static: Fair  Standing - Dynamic : Fair     ADL Intervention:   Grooming  Grooming Assistance: Modified independent (simulated seated EOB)  Washing Face: Modified independent  Brushing/Combing Hair: Modified independent  Lower Body Dressing Assistance  Socks: Supervision/set-up (seated EOB)  Leg Crossed Method Used: Yes  Position Performed: Seated edge of bed  Therapeutic Exercises:   Pt was educated on UE TherEx including scapular strengthening TherEx in order to improve strength and overall activity tolerance for carryover w/ADLs. Pain:  Pt reports 0/10 pain or discomfort prior to treatment. Pt reports 0/10 pain or discomfort post treatment. Activity Tolerance:    Fair, pt requires encouragement     Please refer to the flowsheet for vital signs taken during this treatment.   After treatment:   [X]  Patient left in no apparent distress sitting up in chair  [ ]  Patient left in no apparent distress in bed  [X]  Call bell left within reach  [X]  Nursing notified  [ ]  Caregiver present  [ ]  Bed alarm activated     Shelianore Alt, ABRAHAM/L  Time Calculation: 24 mins

## 2017-09-15 NOTE — PROGRESS NOTES
Patient has been accepted to [facility] and Nonnie Conch has been obtained. Patient would like to transfer tomorrow. MD aware. Malathi Patino stated they can receive the patient tomorrow. Weekend CM aware.

## 2017-09-15 NOTE — PROGRESS NOTES
Bedside and Verbal shift change report given to  Franklin Bae (oncoming nurse) by Sobia Thomson RN (offgoing nurse). Report included the following information SBAR, Kardex, MAR and Recent Results. SITUATION:   · Code Status: Full Code  · Reason for Admission: GI bleed  · Acute febrile illness  · Generalized weakness  · Sepsis ([facility] [address]     · Hospital day: 5  · Problem List:   [facility] Problems  Date Reviewed: 8/3/2017                         Codes Class Noted POA                GI bleed ICD-10-CM: K92.2  ICD-9-CM: 657. 9   9/9/2017 Unknown             Generalized weakness ICD-10-CM: R53.1  ICD-9-CM: 780.79   9/9/2017 Unknown             Acute febrile illness ICD-10-CM: R50.9  ICD-9-CM: 780.60   9/9/2017 Unknown             Sepsis ([facility] [address] ICD-10-CM: A41.9  ICD-9-CM: 038.9, 995.91   9/9/2017 Unknown             Anemia ICD-10-CM: D64.9  ICD-9-CM: 307. 9   5/20/2014 Yes             Chronic diastolic heart failure ([facility] [address] ICD-10-CM: I50.32  ICD-9-CM: 428.32   Unknown Yes      Overview Addendum 2/3/2017 11:44 AM by Janak Jimenez MD        2/17; 11/16; 8/16 QGFO9;   Stable symptoms;  11/16 much better chr edema                Chest pain, unspecified ICD-10-CM: R07.9  ICD-9-CM: 786.50   Unknown Yes      Overview Addendum 4/26/2013 12:16 PM by Janak Jimenez MD        Likely musculoskeletal/pleurotoc; will Rx medically now and consider cath in future if needed                Chronic kidney disease, unspecified ICD-10-CM: N18.9  ICD-9-CM: 208. 9   Unknown Yes             Paroxysmal atrial fibrillation (HCC) ICD-10-CM: I48.0  ICD-9-CM: 427.31   Unknown Yes      Overview Addendum 2/3/2017 11:43 AM by Janak Jimenez MD        2/17 occ palp but SR on EKG; 11/16 SR on rythmol  pt in SR since 3/13 atleast  5/14 amio d/thomas due to hyperthyroid; 4/15 nl TFT; 5/15; 10/15 SR on rhythmol  chr warfarin due to recurrent DVT; f/u INR PCP                       BACKGROUND:                         Past Medical History:        Past Medical History:   Diagnosis Date    A-fib [facility])      Aortic valve disorders 8/31/2015     7/15 mild AS     Arrhythmia       a-fib     Arthritis      Autoimmune disease ([facility] [address]       lupus 2001  Reynauds disease  Sjogrens    CAD (coronary artery disease)       AFIB  2011 Dr Mikaela Hinojosa Chest pain, unspecified       Poss atyp angina, will Rx medically now and consider cath in future if needed    Chronic diastolic heart failure (HCC)       Stable symptoms    Chronic kidney disease      Chronic kidney disease, unspecified      Chronic venous embolism and thrombosis of unspecified deep vessels of lower extremity       8/11    Congestive heart failure, unspecified      Edema       improving    Essential hypertension      Hyperlipidemia      Hypertension      Lupus (HCC)      Mitral regurgitation and mitral stenosis 10/29/2015     7/15 mild     Mitral valve disorders 8/31/2015     7/15 mild MS/MR     Pancytopenia (HCC)      Paroxysmal atrial fibrillation (HCC)      Rheumatoid arthritis ([facility] [address]                                  Patient taking anticoagulants no      ASSESSMENT:   ¨ Changes in Assessment Throughout Shift: none     ¨ Patient has Central Line: no Reasons if yes:   ¨ Patient has Epperson Cath: no Reasons if yes:       ¨ Last Vitals:             Vitals:     09/13/17 2000 09/14/17 0000 09/14/17 0400 09/14/17 0512   BP: 146/76 144/72 136/70     Pulse: 76 76 76     Resp: 18 18 18     Temp: 98.2 °F (36.8 °C) 99.5 °F (37.5 °C) 99.4 °F (37.4 °C)     SpO2: 97% 96% 97%     Weight:       79.2 kg (174 lb 8 oz)   Height:                 ¨ IV and DRAINS (will only show if present)                        [REMOVED] Peripheral IV 09/09/17 Left Hand-Site Assessment: Clean, dry, & intact  [REMOVED] Peripheral IV 09/08/17 Left Antecubital-Site Assessment: Clean, dry, & intact  [REMOVED] Peripheral IV 09/10/17 Right Forearm-Site Assessment: Clean, dry, & intact  Peripheral IV 09/14/17-Site Assessment: Clean, dry, & intact     ¨ WOUND (if present)                        Wound Type:  none                        Dressing present Dressing Present : No                        Wound Concerns/Notes:  none     · PAIN                                              Pain Assessment                                              Pain Intensity 1: 0 (09/14/17 0400)                                                                                                                                            Patient Stated Pain Goal: 0  ¨ Interventions for Pain:  NO STATED PAIN  ¨ Intervention effective: YES  ¨ Time of last intervention:  Denied pain. ¨ Reassessment Completed: YES      · Last 3 Weights:        Last 3 Recorded Weights in this Encounter     09/11/17 1035 09/12/17 2118 09/14/17 0512   Weight: 79.4 kg (175 lb) 79.6 kg (175 lb 6.4 oz) 79.2 kg (174 lb 8 oz)      Weight change: -0.408 kg (-14.4 oz)     · INTAKE/OUPUT                                              Current Shift:                                                Last three shifts: 09/12 1901 - 09/14 0700  In: 240 [P.O.:240]  Out: -      · LAB RESULTS            Recent Labs       09/14/17   0400  09/13/17   0328  09/12/17   0400   WBC  5.4  5.1  4.1*   HGB  8.6*  8.8*  8.6*   HCT  29.9*  30.0*  29.3*   PLT  278  248  238                Recent Labs       09/14/17   0400  09/13/17   0328  09/12/17   0400   NA  143  144  145   K  3.6  3.4*  3.5   GLU  76  92  83   BUN  20*  19*  20*   CREA  1.55*  1.77*  1.63*   CA  8.0*  8.3*  8.3*   INR  3.7*  3.4*  3.0*         RECOMMENDATIONS AND DISCHARGE PLANNING      1. Pending tests/procedures/ Plan of Care or Other Needs: Pending Echo;Possible repeat of Nuclear Stress Test      2. Discharge plan for patient and Needs/Barriers: TBD     3. Estimated Discharge Date: 9/15/17 Posted on Whiteboard in Patients Room: yes       4.  The patient's care plan was reviewed with the oncoming nurse.       \"HEALS\" SAFETY CHECK                            Fall Risk                         Total Score: 4                         Safety Measures: Safety Measures: Bed/Chair alarm on, Bed/Chair-Wheels locked, Bed in low position, Call light within reach, Fall prevention (comment), Gripper socks, Side rails X 3     A safety check occurred in the patient's room between off going nurse and oncoming nurse listed above.     The safety check included the below items  Area Items   H  High Alert Medications § Verify all high alert medication drips (heparin, PCA, etc.)   E  Equipment § Suction is set up for ALL patients (with tiera)  § Red plugs utilized for all equipment (IV pumps, etc.)  § WOWs wiped down at end of shift. § Room stocked with oxygen, suction, and other unit-specific supplies   A  Alarms § Bed alarm is set for fall risk patients  § Ensure chair alarm is in place and activated if patient is up in a chair   L  Lines § Check IV for any infiltration  § Epperson bag is empty if patient has a Epperson   § Tubing and IV bags are labeled   S  Safety § Room is clean, patient is clean, and equipment is clean. § Hallways are clear from equipment besides carts. § Fall bracelet on for fall risk patients  § Ensure room is clear and free of clutter  § Suction is set up for ALL patients (with tiera)  § Hallways are clear from equipment besides carts.    § Isolation precautions followed, supplies available outside room, sign posted

## 2017-09-15 NOTE — ADT AUTH CERT NOTES
CLINICAL FOR 9/14/17 by Nina Moncada RN        Review Entered Review Status       9/15/2017 In Primary       Details         /72, TEMP 99.4, HR 76, RR 18, O2 97 ON RA  PT, OT, NUT SUPP, STRICT I/O, CARD MONITORING, AMB W/ ASSIST  LOPRESSOR 100 MG BID PO, RYTHMOL 225 MG TID PO, CRESTOR 40 MG QD PO, ALDACTONE 25 MG QD PO, VANC 125 MG Q6H PO, LASIX 40 MG BID IV  CARDIOLOGY PROGRESS NOTE  RECS:        1. Chest pain/ palpitations-no recurrence today. Her SOB is improving. Continue diuresis with lasix 40 mg ivp. Will change to po tomorrow.    2. Paroxymal atrial fibrillation- currently NSR. Continue with Propafenone     3. Acute on chronic diastolic heart failure- SOB improving Continue diuresis with lasix   4. AS and MS- moderate on recent echo   5. GI bleed- ok to hold coumadin. 6. Anemia- severe s/p transfusion 09/08/17   7. SHRAVAN- monitor creatinine now on lasix ivp  8. Nausea and diarrhea- improving symptoms   9. Hypokalemia- resolved continue  Add aldactone     Given CKD, will continue medical management for now. Invasive w/u if HF worsens or recurs.     IM NOTE  Pt reports improved sx of sob, diarrhea, still declines SNF placement.  -C.diff colitis- pt c/o multiple episodes of diarrhea for several months. Has had recent abx to treat UTI. Will cont PO vanc as pt reports improved symptoms.  -Anemia-chronic. Heme + brown stool per ED physician report in the setting of C.diff colitis. S/p GI eval with recommendations of no endoscopy at this time. Pt of note on coumadin for DVT/afib  with supratheraputic INR. S/p PRBC transfusion 1 unit. Will hold coumadin until stability of Hgb continues to be established. Transfuse PRN. -Recent UTI-s/p abx. -SHRAVAN improving cont to monitor, renally dose meds, avoid nephrotoxic drugs. Will consult nephrology if no improvement with these measures. .  -History of Afib on coumadimn propafenone supratheraputic INR continues despite holding coumadin, but no overt bleeding. Will cont to hold coumadin until hgb stability continues to be established and when INR stops trending up.  -Complaints of SOB likely multifactorial. Pt has history of mild to mod chronic pulm fibrosis thought likely to be secondary to her collagen vascular disease. She also has valvular disease  (mod AS and mod MS), mod pulm HTN, and likely exacerbated by anemia as documented above. CXR checked recently unrevealing. . Doubt PE given theraputic INR. Pulmonary recommendations noted. Has marked improvement with diuresis. To be started on po lasix tomorrow.  -Complaints of chest pain s/p cards eval. Trop negative on bb statin.  Appreciate input from cardiology. Cardiac w/u if recurrence of chest pain, worsening HF.  -Failure to thrive- pt presented with cc of progressive weakness. Likely multifactorial. PT/OT consulted. -Coagulopathy secondary to coumadin. Continuing to hold coumadin. No overt bleeding. Will cont to monitor.   Likely home with home health tomorrow.                        CLINICAL FOR 9/13/17 by Shonda Fernandez RN        Review Entered Review Status       9/15/2017 In Primary       Details          /74, TEMP 99.2, HR 66, RR 18, O2 97 ON RA     RBC 3.66, HGB 8.8, HCT 30, INR 3.4, PT 33.5, K 3.4, , BUN 19, CREAT 1.77, BNP 14,469     PT, OT, STRICT I/O, CARD MONITORING, AMB W/ ASSIST  RYTHNMOL 225 MG TID PO, CRESTOR 40 MG QD PO, ALDACTONE 25 MG QD PO, VANC 125 MG Q6H PO, LOPRESSOR 100 MG BID PO, KDUR 20 MEQ PO X 1, LASIX 40 MG BID IV, FLAGYL 500 MG TID PO  CARD NOTE  CARDIOLOGY PROGRESS NOTE  RECS:        1. Chest pain/ palpitations-no recurrence today. Her SOB is improving. Continue diuresis with lasix 40 mg ivp. Tolerating increased dose of  Bb.  continue statin. LHC will be risky wirh CKD. No need of stress test if CP improve with CHF Rx.  2. Paroxymal atrial fibrillation- currently NSR. Continue with Propafenone     3. Acute on chronic diastolic heart failure- decompensated.  Continue diuresis with lasix   4. AS and MS- moderate on recent echo   5. GI bleed- ok to hold coumadin. 6. Anemia- severe s/p transfusion 09/08/17   7. SHRAVAN- monitor creatinine now on lasix ivp  8. Nausea and diarrhea- improving symptoms   9. Hypokalemia- given 20 +K meq once. Add aldactone     NEPH NOTE  Spoke with Dr Nikko Bentley, hold off on consult, pt to follow with Dr Lange Quiet as outpt     IM NOTE  -C.diff colitis- pt c/o multiple episodes of diarrhea for several months. Has had recent abx to treat UTI. No improvement in diarrhea despite po flagyl, will switch to oral vanc.    -Anemia-chronic. Heme + brown stool per ED physician report in the setting of C.diff colitis. S/p GI eval with recommendations of no endoscopy at this time. Pt of note on coumadin for DVT/afib  with therapeutic INR. S/p PRBC transfusion 1 unit. Will hold coumadin until stability of Hgb continues to be established. Transfuse PRN. -Recent UTI-s/p abx. -SHRAVAN improving cont to monitor, renally dose meds, avoid nephrotoxic drugs. Case discussed with nephrologist, current creat similar to baseline values. Will cont to monitor.  -History of Afib on coumadin propafenone INR=3.4  today. Will cont to hold coumadin until INR no longer supratheraputic and hgb stability continues to be established.  -Complaints of SOB likely multifactorial. Pt has history of mild to mod chronic pulm fibrosis thought likely to be secondary to her collagen vascular disease. She also has valvular disease  (mod AS and mod MS), mod pulm HTN, and likely exacerbated by anemia as documented above. CXR checked recently unrevealing. . Doubt PE given theraputic INR. Pulmonary recommendations noted, significant improvement today after additional dosing of lasix. transfuse PRN. -Complaints of chest pain s/p cards eval. Trop negative on bb statin.  Appreciate input from cardiology. No further cardiac w/u if cp resolves with diuresis.   -Failure to thrive- pt presented with cc of progressive weakness. Likely multifactorial. PT/OT consulted.                        Sepsis and Other Febrile Illness, without Focal Infection - Care Day 4 (9/12/2017) by Aure Shay RN        Review Entered Review Status       9/13/2017 Completed       Details              Care Day: 4 Care Date: 9/12/2017 Level of Care: Telemetry       Guideline Day 3        Level Of Care       (X) Floor              Clinical Status       (X) * Mental status normal or at baseline              Activity       (X) Activity as tolerated       9/13/2017 1:18 PM EDT by Randall Muniz         WITH ASSIST                     Routes       (X) * Oral hydration       (X) Diet as tolerated       (X) Parenteral or oral medication       9/13/2017 1:18 PM EDT by Randall Muniz         LASIX 40 MG BID IV, RYTHMOL 225 MG TID PO                     Interventions       (X) WBC       9/13/2017 1:18 PM EDT by Randall Muniz         4.1                     Medications       (X) Antibiotics       9/13/2017 1:18 PM EDT by Randall Muniz         FLAGYL 500 MG TID PO                                          * Milestone              Additional Notes       CARDIOLOGY PROGRESS NOTE       RECS:                       1. Chest pain/ palpitations- likely angina in a patient with multiple risk factors. So far with negative troponin and EKG w/u acute ischemic changes. Patient with hx of Abnormal NUC stress test in the past. Will continue monitoring. Increase bb and continue statin. LHC will be risky wirh CKD       2. Paroxymal atrial fibrillation- currently NSR. Continue with Propafenone          3. CAD- Hx 4/14 abnormal stress test with lat ischemia; med Rx       4. Acute on chronic diastolic heart failure- appears compensated.  use IV lasix today and f/u closley        5. AS and MS- moderate on recent echo        6. GI bleed- ok to hold coumadin.        7. Anemia- severe s/p transfusion 09/08/17 monitor H&H closely        8.  Possible sepsis- unclear source possible UTI vs GI- w/u and management per medical team        9. SHRAVAN- creatinine slowly improving        10. Nausea and diarrhea- patient is on Flagyl. w/u and medications per medical team                PULM NOTE       CARDIOLOGY PROGRESS NOTE       RECS:                       11. Chest pain/ palpitations- likely angina in a patient with multiple risk factors. So far with negative troponin and EKG w/u acute ischemic changes. Patient with hx of Abnormal NUC stress test in the past. Will continue monitoring. Increase bb and continue statin. LHC will be risky wirh CKD       12. Paroxymal atrial fibrillation- currently NSR. Continue with Propafenone          13. CAD- Hx 4/14 abnormal stress test with lat ischemia; med Rx       14. Acute on chronic diastolic heart failure- appears compensated.  use IV lasix today and f/u closley        15. AS and MS- moderate on recent echo        16. GI bleed- ok to hold coumadin.        17. Anemia- severe s/p transfusion 09/08/17 monitor H&H closely        18. Possible sepsis- unclear source possible UTI vs GI- w/u and management per medical team        19. SHRAVAN- creatinine slowly improving        20. Nausea and diarrhea- patient is on Flagyl. w/u and medications per medical team                IM NOTE       Pt reports she continues to have diarrhea, chest pain and sob, to about the same degree as she has been lately               Assessment and Plan:        72 year old female who presented on 09/09 with cc of weakness and fever.               -C. diff colitis- pt c/o multiple episodes of diarrhea for several months. Has had recent abx to treat UTI. Will cont flagyl. Pt with continued diarrhea.       -Anemia-chronic. Heme + brown stool per ED physician report in the setting of C.diff colitis. S/p GI eval with recommendations of no endoscopy at this time. Pt of note on coumadin for DVT/afib  with therapeutic INR. S/p PRBC transfusion 1 unit.  Will hold coumadin until stability of Hgb continues to be established. Transfuse PRN.       -Recent UTI-s/p abx.       -SHRAVAN improving cont to monitor, renally dose meds, avoid nephrotoxic drugs. Will consult nephrology if no improvement with these measures. .       -History of Afib on coumadimn propafenone INR=3.0  today. Will hold coumadin until hgb stability continues to be established and when INR stops trending up.       -Complaints of SOB likely multifactorial. Pt has history of mild to mod chronic pulm fibrosis thought likely to be secondary to her collagen vascular disease. She also has valvular disease  (mod AS and mod MS), mod pulm HTN, and likely exacerbated by anemia as documented above. CXR checked recently unrevealing. . Doubt PE given theraputic INR. Pulmonary recommendations noted, was given one time dose of lasix, without much improvement. transfuse PRN.       -Complaints of chest pain s/p cards eval. Trop negative on bb statin.  Appreciate input from cardiology. Cardiac w/u when stable.       -Failure to thrive- pt presented with cc of progressive weakness. Likely multifactorial. PT/OT consulted.               /75, TEMP 99.1, HR 80, RR 20, O2 95 ON RA              WBC 4.1, RBC 3.57, HGB 9.6, HCT 29.3, INR 3.0, PT 30.7, , BUN 20, CREAT 1.63, CA 8.3              PT, OT, AMB W/ ASSIST, CONTACT ISOLATION, STRICT I/O, NEURO ASSESS           CLINICAL FOR 9/11/17 by Roly Lopez RN        Review Entered Review Status       9/13/2017 In Primary       Details         /77, TEMP 100, HR 80, RR 17, O2 95 ON RA  INR 2.9, PT 29.8, CO2 20, BUN 21, CREAT 1.83, CA 7.9  9/10: C DIFF +     CXR: Cardiomegaly. Low lung volumes with by basilar hypoventilatory changes. Possible  small bilateral pleural effusions.  No change in the two-day interval.     CARD MONITORING, CARDIAC DIET, AMB W/ ASSIST, CONTACT ISOLATION, STRICT I/O  FLAGYL 500 MG TID PO, RYTHMOL 225 MG TID PO, VANC 1250 MG IV X 1   PULM CONSULT  · Overall recommendation is to restore her fragile equilibrium of many chronic diseases:  · Treat CHF and anemia: Agree with transfusing to Hb 8, use lasix to decrease weight by about 10 lbs. ·  Repeat CXR after 5+ lbs diuresis  · Maintain SpO2 >92% at all times. · Continue oral steroids at maintenance doses, only add stress dose steroids if indicated (infection, surgery, shock, etc.).  High dose steroids are not recommended at this time due to stable fibrosis pattern.  Patient is not having symptoms of a flare of CVD. · Aspiration precautions, repeat modified barium swallowing study (MBS) this admission, if evidence of distal esophageal stricture, consult GI for consideration of EGD/dilation  · Modify diet if indicated by MBS results  · Consider colonoscopy to r/o colon cancer (i.e., would not assume c diff is sole source of anemia although it might be)  · Treat C diff with po vanco, flagyl, 2 week course minimum  · BNP, ESR  · Out patient pulmonary follow up and testing- PFT  · Assess home Oxygen needs at discharge  · OT, PT, with focus on fall prevention; cautious supervised OOB and ambulate.  I don't think her pulmonary hypertension or valvular heart disease alone is the cause of her falls; more likely a combination of all the above especially anemia. · Healthy weight   Will Follow  CARDIOLOGY PROGRESS NOTE  RECS:        1. Chest pain/ palpitations- likely angina in a patient with multiple risk factors. So far with negative troponin and EKG w/u acute ischemic changes. Patient with hx of Abnormal NUC stress test in the past. Will continue monitoring continue with bb and statin.    Cardiac w/u when stable. 2. Paroxymal atrial fibrillation- currently NSR. Continue with Propafenone     3. CAD- Hx 4/14 abnormal stress test with lat ischemia; med Rx  4. Acute on chronic diastolic heart failure- mildly decompensated. Will use lasix prn for now.    5. AS and MS- moderate on recent echo   6. Hx Pulmonary hypertension  7.  GI bleed- ok to hold coumadin. 8. Anemia- severe s/p transfusion 09/08/17 monitor H&H closely   9. Possible sepsis- unclear source possible UTI vs GI- w/u and management per medical team   10. SHRAVAN- creatinine slowly improving      Patient with stable angina- but has SHRAVAN and anemia. Continue medical management. Will f/u     IM NOTE  Pt c/o sob which is new.        Assessment and Plan:   72 year old female who presented on 09/09 with cc of weakness and fever.     -C. diff colitis- pt c/o multiple episodes of diarrhea for several months. Has had recent abx to treat UTI. Will cont flagyl.  -Anemia-chronic. Heme + brown stool per ED physician report in the setting of C.diff colitis. S/p GI eval with recommendations of no endoscopy at this time. Pt of note on coumadin for DVT/afib  with therapeutic INR. S/p PRBC transfusion 1 unit. Will hold coumadin until stability of Hgb continues to be established. Transfuse PRN. -Recent UTI-s/p abx. -SHRAVAN improving cont to monitor, renally dose meds, avoid nephrotoxic drugs. Will consult nephrology.  -History of Afib on coumadimn propafenone INR=2.5 today. Will hold coumadin until hgb stability continues to be established.  -Complaints of SOB likely multifactorial. Pt has history of mild to mod chronic pulm fibrosis thought likely to be secondary to her collagen vascular disease. She also has valvular disease  (mod AS and mod MS), mod pulm HTN, and likely exacerbated by anemia as documented above. CXR checked yesterday unrevealing. . Doubt PE given theraputic INR. Pulmonary recommendations noted, will give one time dose of lasix, transfuse PRN. -Complaints of chest pain s/p cards eval. Trop negative on bb statin.  Appreciate input from cardiology. Cardiac w/u when stable. -Failure to thrive- pt presented with cc of progressive weakness. Likely multifactorial. Will consult PT/OT.

## 2017-09-15 NOTE — PROGRESS NOTES
[facility] Hospitalist Group  Progress Note    Patient: Alondra Gotti Age: 73 y.o. : 1945 MR#: [id number] SSN: [id number]  Date: 9/15/2017     Subjective:   Pt states that she is doing better. She denies any nausea, vomiting, chest pain, palpitations, SOB, and no diarrhea episode this morning. Assessment/Plan:   -C.diff colitis- pt c/o multiple episodes of diarrhea for several months. Has had recent abx to treat UTI. Will cont PO vanc as pt reports improved symptoms.   -Anemia-chronic. S/p GI eval with recommendations of no endoscopy at this time. Pt of note on coumadin for DVT/afib  with supratheraputic INR. S/p PRBC transfusion 1 unit. Will hold coumadin until stability of Hgb continues to be established. Transfuse PRN. -Recent UTI-s/p abx.  -Acute on chronic diastolic heart failure- improved  Continue diuresis with lasix po  -SHRAVAN improving cont to monitor, renally dose meds, avoid nephrotoxic drugs. Will consult nephrology if no improvement with these measures. .  -History of Afib on coumadin propafenone supratheraputic INR continues despite holding coumadin, but no overt bleeding. Will cont to hold coumadin until hgb stability continues to be established and when INR stops trending up. Cardiology following  -Complaints of SOB likely multifactorial. Pt has history of mild to mod chronic pulm fibrosis thought likely to be secondary to her collagen vascular disease. She also has valvular disease  (mod AS and mod MS), mod pulm HTN, and likely exacerbated by anemia as documented above. CXR checked recently unrevealing. . Doubt PE given theraputic INR. Pulmonary recommendations noted. Has marked improvement with diuresis. To be started on po lasix today.  -Complaints of chest pain - no complaints of chest pain/palpitations today. cards following.   -Failure to thrive- pt presented with cc of progressive weakness. Likely multifactorial. PT/OT consulted.   -Coagulopathy secondary to coumadin. Continuing to hold coumadin. No overt bleeding. Will cont to monitor. Additional Notes:  Initial plan for possible discharge to home today, however family states they will not be able to take care of patient, pt initially declined SNF but now is open to the idea. Care management states likely Monday  SNF placement.      Case discussed with:  [x]Patient  [x]Family  []Nursing  [x]Case Management  DVT Prophylaxis:  []Lovenox  []Hep SQ  []SCDs  []Coumadin   []On Heparin gtt    Objective:   VS:   Visit Vitals    /73 (BP 1 Location: Left arm, BP Patient Position: At rest)    Pulse 79    Temp 99.7 °F (37.6 °C)    Resp 18    Ht 5' 6\" (1.676 m)    Wt 78.5 kg (173 lb)    SpO2 99%    Breastfeeding No    BMI 27.92 kg/m2      Tmax/24hrs: Temp (24hrs), Av.6 °F (37 °C), Min:97.6 °F (36.4 °C), Max:99.7 °F (37.6 °C)    Intake/Output Summary (Last 24 hours) at 09/15/17 1412  Last data filed at 09/15/17 0353   Gross per 24 hour   Intake              100 ml   Output                0 ml   Net              100 ml       General:  Alert, awake, in NAD  Cardiovascular:  Rrr, no murmurs  Pulmonary:  ctab  GI:  Soft, nt, nd, +bs  Extremities:  No edema    Labs:    Recent Results (from the past 24 hour(s))   PROTHROMBIN TIME + INR    Collection Time: 09/15/17  3:04 AM   Result Value Ref Range    Prothrombin time 40.5 (H) 11.5 - 15.2 sec    INR 4.3 (H) 0.8 - 1.2     METABOLIC PANEL, BASIC    Collection Time: 09/15/17  3:04 AM   Result Value Ref Range    Sodium 144 136 - 145 mmol/L    Potassium 3.6 3.5 - 5.5 mmol/L    Chloride 114 (H) 100 - 108 mmol/L    CO2 21 21 - 32 mmol/L    Anion gap 9 3.0 - 18 mmol/L    Glucose 68 (L) 74 - 99 mg/dL    BUN 20 (H) 7.0 - 18 MG/DL    Creatinine 1.58 (H) 0.6 - 1.3 MG/DL    BUN/Creatinine ratio 13 12 - 20      GFR est AA 39 (L) >60 ml/min/1.73m2    GFR est non-AA 32 (L) >60 ml/min/1.73m2    Calcium 8.2 (L) 8.5 - 10.1 MG/DL       Signed By: Andrea Kendall PA-C     September 15, 2017 2:12 PM

## 2017-09-15 NOTE — PROGRESS NOTES
NUTRITION    Nursing Referral: [facility]      RECOMMENDATIONS / PLAN:     - Add nutrition supplement: Ensure Enlive TID  - Continue RD inpatient monitoring and evaluation      NUTRITION INTERVENTIONS & DIAGNOSIS:     [x] Meals/snacks: modified diet  [x] Medical food supplementation: add    Nutrition Diagnosis:  Inadequate oral intake related to decreased appetite as evidenced by poor/variable meal intake    ASSESSMENT:     9/15: Pt reported poor appetite and meal intake. Denied having any food preferences. Discussed adding nutrition supplement; pt agreed with plan    9/11: Pt with fair/good meal intake per chart and nursing report. Tolerating diet.      Average po intake adequate to meet patients estimated nutritional needs:   [] Yes     [x] No   [] Unable to determine at this time    Diet: DIET CARDIAC Regular      Food Allergies:  None known (latex)  Current Appetite:   [] Good     [] Fair     [x] Poor     [] Other:     Appetite/meal intake prior to admission:   [] Good     [] Fair     [] Poor     [x] Other: unknown   Feeding Limitations:  [] Swallowing difficulty    [] Chewing difficulty    [] Other:  Current Meal Intake:   Patient Vitals for the past 100 hrs:   % Diet Eaten   09/12/17 1355 25 %   09/12/17 0954 0 %   09/11/17 1257 60 %       BM:  9/14 - loose  Skin Integrity:  No pressure ulcer or wound noted  Edema:  None   Pertinent Medications: Reviewed    Recent Labs      09/15/17   0304  09/14/17   0400  09/13/17   0328   NA  144  143  144   K  3.6  3.6  3.4*   CL  114*  113*  112*   CO2  21  23  24   GLU  68*  76  92   BUN  20*  20*  19*   CREA  1.58*  1.55*  1.77*   CA  8.2*  8.0*  8.3*       Intake/Output Summary (Last 24 hours) at 09/15/17 1127  Last data filed at 09/15/17 0353   Gross per 24 hour   Intake              100 ml   Output                0 ml   Net              100 ml       Anthropometrics:  Ht Readings from Last 1 Encounters:   09/08/17 5' 6\" (1.676 m)     Last 3 Recorded Weights in this Encounter 09/12/17 2118 09/14/17 0512 09/15/17 0736   Weight: 79.6 kg (175 lb 6.4 oz) 79.2 kg (174 lb 8 oz) 78.5 kg (173 lb)     Body mass index is 27.92 kg/(m^2). Weight History:  Noted wt gain PTA per chart hx    Weight Metrics 9/15/2017 8/3/2017 2/3/2017 11/7/2016 11/3/2016 8/15/2016 8/3/2016   Weight 173 lb 173 lb 156 lb 158 lb 158 lb 162 lb 158 lb   BMI 27.92 kg/m2 27.92 kg/m2 25.18 kg/m2 25.5 kg/m2 25.5 kg/m2 26.15 kg/m2 25.51 kg/m2        Admitting Diagnosis: GI bleed  Acute febrile illness  Generalized weakness  Sepsis (HCC)  Pertinent PMHx: CAD, chronic diastolic heart failure, CKD, HTN, HLD    Education Needs:        [x] None identified  [] Identified - Not appropriate at this time  []  Identified and addressed - refer to education log  Learning Limitations:   [x] None identified  [] Identified    Cultural, Denominational & ethnic food preferences:  [x] None identified    [] Identified and addressed     ESTIMATED NUTRITION NEEDS:     Calories: 2650-2433 kcal (30-33 kcal/kg) based on  [] Actual BW      [x] SBW: 66 kg   Protein: 40-53 gm (0.6-0.8 gm/kg) based on  [] Actual BW      [x] SBW   Fluid: 1 mL/kcal     MONITORING & EVALUATION:     Nutrition Goal(s):   1. Po intake of meals will meet >75% of patient estimated nutritional needs within the next 7 days.   Outcome:  [] Met/Ongoing    [x]  Not Met    [] New/Initial Goal     Monitoring:   [x] Diet tolerance   [x] Meal intake   [x] Supplement intake   [] GI symptoms/ability to tolerate po diet   [] Respiratory status   [] Plan of care      Previous Recommendations (for follow-up assessments only):     []   Implemented       []   Not Implemented (RD to address)     [x] No Recommendation Made     Discharge Planning:  Cardiac diet   [x] Participated in care planning, discharge planning, & interdisciplinary rounds as appropriate      Chitra Camp, [address]   Pager: [phone]

## 2017-09-15 NOTE — PROGRESS NOTES
[facility]      CARDIOLOGY PROGRESS NOTE  RECS:      1. Chest pain/ palpitations- resolved. no recurrence   2. Paroxymal atrial fibrillation- currently NSR. Continue with Propafenone     3. Acute on chronic diastolic heart failure- improved  Continue diuresis with lasix po.   4. AS and MS- moderate on recent echo   5. GI bleed- Coumadin on hold due to supra Therapeutic INR  6. Anemia- severe s/p transfusion 09/08/17   7. SHRAVAN- improving  Creatinine. 8. Nausea and diarrhea- improving symptoms   9. Hypokalemia- resolved continue. continue aldactone    Her CHF is much better today. Continue with po lasix    SUMMARY: Echo 8/17  Left ventricle: Systolic function was hyperdynamic by visual assessment. Ejection fraction was estimated in the range of 70 % to 75 %. There were  no regional wall motion abnormalities. There was mild concentric  hypertrophy. There was dynamic obstruction at rest in the mid cavity  during systole and in diastole, with systolic mid-cavity obliteration. Doppler parameters were consistent with restrictive physiology, indicative  of decreased left ventricular diastolic compliance and/or increased left  atrial pressure. Right ventricle: Systolic function was reduced. Left atrium: The atrium was severely dilated. Mitral valve: There was marked annular calcification. There was  moderate-marked thickening. There was moderately reduced leaflet  separation. The findings were consistent with moderate mitral stenosis. There was mild regurgitation. Mean transmitral gradient was 5.66 mmHg. Aortic valve: The valve was probably trileaflet. Leaflets exhibited  moderately to markedly increased thickness and mildly reduced cuspal  separation. Transaortic velocity was increased due to valvular stenosis. There was moderate stenosis. Valve mean gradient was 24.45 mmHg. Tricuspid valve: There was moderate regurgitation. Pulmonary artery  systolic pressure: 36 mmHg.   Pulmonic valve: There was mild regurgitation. ASSESSMENT:  Hospital Problems  Date Reviewed: 8/3/2017          Codes Class Noted POA    GI bleed ICD-10-CM: K92.2  ICD-9-CM: 578.9  9/9/2017 Unknown        Generalized weakness ICD-10-CM: R53.1  ICD-9-CM: 780.79  9/9/2017 Unknown        Acute febrile illness ICD-10-CM: R50.9  ICD-9-CM: 780.60  9/9/2017 Unknown        Sepsis ([facility] ICD-10-CM: A41.9  ICD-9-CM: 038.9, 995.91  9/9/2017 Unknown        Anemia ICD-10-CM: D64.9  ICD-9-CM: 285.9  5/20/2014 Yes        Chronic diastolic heart failure ([facility] ICD-10-CM: I50.32  ICD-9-CM: 428.32  Unknown Yes    Overview Addendum 2/3/2017 11:44 AM by Nicolás Moctezuma MD     2/17; 11/16; 8/16 [id number];   Stable symptoms;  11/16 much better chr edema             Chest pain, unspecified ICD-10-CM: R07.9  ICD-9-CM: 786.50  Unknown Yes    Overview Addendum 4/26/2013 12:16 PM by Nicolás Moctezuma MD     Likely musculoskeletal/pleurotoc; will Rx medically now and consider cath in future if needed             Chronic kidney disease, unspecified ICD-10-CM: N18.9  ICD-9-CM: 387. 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 Breathing improved   HENT: Normocephalic, atraumatic. Normal external eye. Neck :  JVD difficult to assess due to obesity, likely high  Cardiac:  regular rate and rhythm, S1, S2 normal, no S3 or S4, no click, no rub. High-pitched harsh midsystolic murmur is present with a grade of 3/6  at the apex radiating to the neck  Lungs: clear bilaterally. Abdomen: Soft, nontender, no masses  Extremities:  No c/c/e, peripheral pulses present      Labs: Results:       Chemistry Recent Labs      09/15/17   0304 09/14/17   0400  09/13/17   0328   GLU  68*  76  92   NA  144  143  144   K  3.6  3.6  3.4*   CL  114*  113*  112*   CO2  21  23  24   BUN  20*  20*  19*   CREA  1.58*  1.55*  1.77*   CA  8.2*  8.0*  8.3*   AGAP  9  7  8   BUCR  13  13  11*      CBC w/Diff Recent Labs      09/14/17 0400  09/13/17   0328   WBC  5.4  5.1   RBC  3.65*  3.66*   HGB  8.6*  8.8*   HCT  29.9*  30.0*   PLT  278  248   GRANS  59  55   LYMPH  30  34   EOS  1  1      Cardiac Enzymes No results for input(s): CPK, CKND1, ED in the last 72 hours. No lab exists for component: CKRMB, TROIP   Coagulation Recent Labs      09/15/17   0304 09/14/17   0400   PTP  40.5*  35.8*   INR  4.3*  3.7*       Lipid Panel Lab Results   Component Value Date/Time    Cholesterol, total 94 09/10/2017 03:33 AM    HDL Cholesterol 41 09/10/2017 03:33 AM    LDL, calculated 36.4 09/10/2017 03:33 AM    VLDL, calculated 16.6 09/10/2017 03:33 AM    Triglyceride 83 09/10/2017 03:33 AM    CHOL/HDL Ratio 2.3 09/10/2017 03:33 AM      BNP No results for input(s): BNPP in the last 72 hours. Liver Enzymes No results for input(s): TP, ALB, TBIL, AP, SGOT, GPT in the last 72 hours. No lab exists for component: DBIL   Thyroid Studies Lab Results   Component Value Date/Time    TSH 1.77 09/08/2017 09:38 PM              Liliana Beltran Chloe:[phone] supervised.     I have independently evaluated and examined the patient. All relevant labs and testing data's are reviewed. Care plan discussed and updated after review.     Ricardo Costa MD

## 2017-09-15 NOTE — PROGRESS NOTES
Problem: Mobility Impaired (Adult and Pediatric)  Goal: *Acute Goals and Plan of Care (Insert Text)  Physical Therapy Goals  Initiated 9/12/2017 and to be accomplished within 7 day(s)  1. Patient will move from supine to sit and sit to supine in bed with modified independence. 2. Patient will transfer from bed to chair and chair to bed with supervision/set-up using the least restrictive device. 3. Patient will perform sit to stand with supervision/set-up. 4. Patient will ambulate with supervision/set-up for 100 feet with the least restrictive device. Outcome: Progressing Towards Goal  PHYSICAL THERAPY TREATMENT     Patient: Kay Draper (73 y.o. female)  Date: 9/15/2017  Diagnosis: GI bleed  Acute febrile illness  Generalized weakness  Sepsis ([facility] <principal problem not specified>     Precautions: Contact, Fall   Chart, physical therapy assessment, plan of care and goals were reviewed. ASSESSMENT:  Pt found supine in bed willing to work with PT. Pt seen with OT to maximize safety of pt and staff. Pt sat at EOB wit min A then stood for about 10 second before needing to sit. Once rested, pt ambulated a short distance before shaking her head to signify that she can not continue. Pt took steps backward and sat in chair. Pt reports she is unable to continue due to weakness. Pt performed LE therex in chair and left with needs in reach. Pt requires encouragement for all movement. Education: therex, gait. Progression toward goals:  [ ]      Improving appropriately and progressing toward goals  [X]      Improving slowly and progressing toward goals  [ ]      Not making progress toward goals and plan of care will be adjusted       PLAN:  Patient continues to benefit from skilled intervention to address the above impairments. Continue treatment per established plan of care.   Discharge Recommendations:  Rehab  Further Equipment Recommendations for Discharge:  rolling walker       SUBJECTIVE:   Patient stated I can try.       OBJECTIVE DATA SUMMARY:   Critical Behavior:  Neurologic State: Alert  Orientation Level: Oriented X4  Cognition: Follows commands  Safety/Judgement: Awareness of environment, Fall prevention  Functional Mobility Training:  Bed Mobility:  Supine to Sit: Minimum assistance  Transfers:  Sit to Stand: Contact guard assistance;Minimum assistance  Stand to Sit: Contact guard assistance  Balance:  Sitting: Impaired; With support  Sitting - Static: Good (unsupported)  Sitting - Dynamic: Fair (occasional)  Standing: Impaired; With support  Standing - Static: Fair  Standing - Dynamic : Fair  Ambulation/Gait Training:  Distance (ft): 8 Feet (ft)  Assistive Device: Walker, rolling;Gait belt  Ambulation - Level of Assistance: Contact guard assistance  Gait Abnormalities: Decreased step clearance                Therapeutic Exercises:   LAQ, seated marches x 10 bilaterally. Pain:  Pre tx pain: 0  Post tx pain: 0  Pain Scale 1: Numeric (0 - 10)  Pain Intensity 1: 0  Activity Tolerance:   Fair  Please refer to the flowsheet for vital signs taken during this treatment. After treatment:   [ ] Patient left in no apparent distress sitting up in chair  [X] Patient left in no apparent distress in bed  [X] Call bell left within reach  [ ] Nursing notified  [ ] Caregiver present  [ ] Bed alarm activated      Shivam Min PTA   Time Calculation: 24 mins     Mobility  Current  CJ= 20-39%. The severity rating is based on the Level of Assistance required for Functional Mobility and ADLs. Mobility   Goal  CI= 1-19%. The severity rating is based on the Level of Assistance required for Functional Mobility and ADLs.

## 2017-09-15 NOTE — INTERDISCIPLINARY ROUNDS
IDR/SLIDR Summary          Patient: Alondra Gotti MRN: [id number]    Age: 73 y.o. YOB: 1945 Room/Bed: [facility]/   Admit Diagnosis: GI bleed  Acute febrile illness  Generalized weakness  Sepsis ([facility]  Principal Diagnosis: <principal problem not specified>   Goals: Resolve GI bleed and control C Diff  Readmission: NO  Quality Measure: Not applicable  VTE Prophylaxis: Mechanical  Influenza Vaccine screening completed? YES  Pneumococcal Vaccine screening completed? YES  Mobility needs: Yes   Nutrition plan:Yes  Consults:Case Management    Financial concerns:No  Escalated to CM? YES  RRAT Score: 21   Interventions:Home Health  Testing due for pt today?  YES  LOS: 5 days Expected length of stay 3 days  Discharge plan: yes   PCP: Shalini Cabezas MD  Transportation needs: No    Days before discharge:two or more days before discharge   Discharge disposition: Home    Signed:     Felicita Gutiérrez RN  9/14/2017  4:12 AM

## 2017-09-16 NOTE — PROGRESS NOTES
Report called to [facility]. Spoke with Magno Castillo LPN.

## 2017-09-16 NOTE — PROGRESS NOTES
CM called, spoke to Mary Beth Li, she confirmed pt's insurance authorized the patient for skilled care in their facility and the patient can come in today. CM will follow up with the patient on transportation; cm awaiting for dc order. 1:45 PM patient has dc order to [facility]. CM saw the patient, she was informed her insurance does NOT cover transportation cost. Patient called her brother, he will assist with transport to [facility], he will be in the hospital to pick her up around 2 PM or 2:30 PM. CM informed [facility] about patient self transport.

## 2017-09-16 NOTE — PROGRESS NOTES
Mrs. Vijay Garcia has refused barrier cream. Bottom is red proper wound instruction was given by the nurse.

## 2017-09-16 NOTE — DISCHARGE SUMMARY
Addendum to discharge summary     Patient likely had sepsis at admission ( Now improved ) 2/2 C dff. Patient in NAD, feels better, tolerating diet. Denies abdominal pain, denies any blood in stool. TO SNF today. D/w patient, d/w Care Manager    INR GOAL- between 2 and 3  Check daily PT, INR    Current Discharge Medication List      START taking these medications    Details   spironolactone (ALDACTONE) 25 mg tablet Take 1 Tab by mouth daily. Qty: 30 Tab, Refills: 0      vancomycin 50 mg/mL oral solution (compounded) Take 2.5 mL by mouth every six (6) hours for 8 days. Qty: 80 mL, Refills: 0      !! OTHER Incentive spirometry- use as directed  Qty: 1 Each, Refills: 0      !! OTHER Check daily PT, INR. Call supervising physician at facility daily with results  Qty: 1 Each, Refills: 0      !! OTHER Check CBC, CMP, MG in 3 days, results to supervising physician at facility immediately  Qty: 1 Each, Refills: 0       !! - Potential duplicate medications found. Please discuss with provider. CONTINUE these medications which have CHANGED    Details   furosemide (LASIX) 40 mg tablet Take 1 Tab by mouth daily. Qty: 30 Tab, Refills: 0      warfarin (COUMADIN) 1 mg tablet Goal INR is between 2 and 3. Check daily PT, INR. Coumadin to be dosed daily based on daily PT, INR by the supervising physician at facility  Qty: 30 Tab, Refills: 0         CONTINUE these medications which have NOT CHANGED    Details   metoprolol tartrate (LOPRESSOR) 100 mg IR tablet Take 1 Tab by mouth two (2) times a day. Qty: 60 Tab, Refills: 1      loratadine (CLARITIN) 10 mg tablet Take 10 mg by mouth. rosuvastatin (CRESTOR) 40 mg tablet Take 1 Tab by mouth nightly. Qty: 90 Tab, Refills: 2    Associated Diagnoses: Mixed hyperlipidemia      propafenone (RYTHMOL) 225 mg tablet Take 1 Tab by mouth three (3) times daily. Qty: 270 Tab, Refills: 3      Cholecalciferol, Vitamin D3, 1,000 unit cap Take 2,000 Units by mouth daily. sertraline (ZOLOFT) 50 mg tablet Take 100 mg by mouth daily. STOP taking these medications       hydrALAZINE (APRESOLINE) 10 mg tablet Comments:   Reason for Stopping:         triamcinolone acetonide (KENALOG) 0.1 % ointment Comments:   Reason for Stopping:         acetaminophen (TYLENOL) 325 mg tablet Comments:   Reason for Stopping:         clotrimazole-betamethasone (LOTRISONE) topical cream Comments:   Reason for Stopping:         amLODIPine (NORVASC) 5 mg tablet Comments:   Reason for Stopping:         loperamide (IMODIUM) 2 mg capsule Comments:   Reason for Stopping:         traMADol (ULTRAM) 50 mg tablet Comments:   Reason for Stopping:         gabapentin (NEURONTIN) 100 mg capsule Comments:   Reason for Stopping:         isosorbide mononitrate ER (IMDUR) 60 mg CR tablet Comments:   Reason for Stopping:         azaTHIOprine (IMURAN) 50 mg tablet Comments:   Reason for Stopping:         predniSONE (DELTASONE) 5 mg tablet Comments:   Reason for Stopping:         famotidine (PEPCID) 40 mg tablet Comments:   Reason for Stopping:               INSTRUCTIONS    Diet- Cardiac,  Ensure, 1 can po BID  Activity - as tolerated  FALL PRECAUTIONS  ASPIRATION PRECAUTIONS  DECUBITUS PRECAUTIONS  Incentive Spirometry Q30 minutes when awake  PT, OT Evaluate and Treat  Check daily PT, INR.    Check CBC, CMP, Mg in 3 days  F/u with PCP in 1 week  F/u with GI in 2 weeks  F/u with Cardiologist in 2 weeks    Juanjose Suárez MD

## 2017-09-16 NOTE — PROGRESS NOTES
Problem: Self Care Deficits Care Plan (Adult)  Goal: *Acute Goals and Plan of Care (Insert Text)  Occupational Therapy Goals  Initiated 9/13/2017 within 7 day(s). 1. Patient will perform grooming tasks at EOB with modified independence and fair+ dynamic sitting balance. 2. Patient will perform lower body dressing with modified independence utilizing AE, prn.  3. Patient will perform functional task in standing for 5 minutes with supervision for balance to increase activity tolerance for ADLs. 4. Patient will perform toilet transfers with supervision/set-up. 5. Patient will perform all aspects of toileting with supervision/set-up. 6. Patient will participate in upper extremity therapeutic exercise/activities with supervision/set-up for 8 minutes to increase BUE strength for ADLs. 7. Patient will utilize energy conservation techniques during functional activities with minimal verbal cues. Outcome: Progressing Towards Goal  OCCUPATIONAL THERAPY TREATMENT     Patient: Jessie Rob (60 y.o. female)  Date: 9/16/2017  Diagnosis: GI bleed  Acute febrile illness  Generalized weakness  Sepsis ([address] <principal problem not specified>       Precautions: Contact, Fall  Chart, occupational therapy assessment, plan of care, and goals were reviewed. ASSESSMENT:  Pt is agreeable to participate in EOB/OOB activities for OT this session. Pt demonstrates improvement w/maneuvering in bed, was able to perform w/CGA. Upon sitting EOB pt completed ADL grooming task, meeting goal #1. Pt then reports she has been soiled for awhile, noted to have large amount of liquid BM under her. Pt adamantly refusing to perform toileting hygiene std, returned to sup w/SUpervision, required CGA/Min A for bowel hygiene, and Supervision for bladder hygiene supine/side-lying. Pt was educated on [facility] techniques during bed mobility, pt verbalized and demonstrated understanding.   Progression toward goals:  [X]          Improving appropriately and progressing toward goals  [ ]          Improving slowly and progressing toward goals  [ ]          Not making progress toward goals and plan of care will be adjusted       PLAN:  Patient continues to benefit from skilled intervention to address the above impairments. Continue treatment per established plan of care. Discharge Recommendations:  [facility]  Further Equipment Recommendations for Discharge:  bedside commode and shower chair      G-CODES:      Self Care  Current  CJ= 20-39%. The severity rating is based on the Level of Assistance required for Functional Mobility and ADLs. SUBJECTIVE:   Patient stated I can't stand when I constantly have to go to the bathroom.       OBJECTIVE DATA SUMMARY:   Cognitive/Behavioral Status:  Neurologic State: Alert  Orientation Level: Oriented X4  Cognition: Follows commands  Safety/Judgement: Awareness of environment, Fall prevention     Functional Mobility and Transfers for ADLs:              Bed Mobility:  Rolling: Supervision  Supine to Sit: Contact guard assistance  Sit to Supine: Supervision   Balance:  Sitting: Intact     ADL Intervention:   Grooming  Grooming Assistance: Modified independent (at EOB)  Washing Face: Modified independent  Brushing/Combing Hair: Supervision/set-up  Lower Body Dressing Assistance  Socks: Supervision/set-up  Leg Crossed Method Used: Yes     Toileting  Toileting Assistance:  (supine/sidelying)  Bladder Hygiene: Supervision/set-up  Bowel Hygiene: Contact guard assistance;Minimum assistance  Pain:  Pt reports 0/10 pain or discomfort prior to treatment. Pt reports 0/10 pain or discomfort post treatment. Activity Tolerance:    Fair     Please refer to the flowsheet for vital signs taken during this treatment.   After treatment:   [ ]  Patient left in no apparent distress sitting up in chair  [X]  Patient left in no apparent distress in bed  [X]  Call bell left within reach  [X]  Nursing notified  [ ]  Caregiver present  [ ]  Bed alarm activated        HAFSA Samuel  Time Calculation: 24 mins

## 2017-09-16 NOTE — PROGRESS NOTES
Pt. Was cleaned and dressed for discharge. Bottom was cleaned at least every 2 hours during the shift; bottom is red and excoriated; pt. Continues to refuse barrier cream. Education was provided by RN; Pt. states she is going to use \"her own barrier cream\" at the next facility.

## 2017-09-16 NOTE — DISCHARGE INSTRUCTIONS
DISCHARGE SUMMARY from Nurse    The following personal items are in your possession at time of discharge:    Dental Appliances: None  Visual Aid: None     Home Medications: None  Jewelry: None  Clothing: At bedside, Footwear, Pants, Shirt, Undergarments  Other Valuables: None  Personal Items Sent to Safe: none          PATIENT INSTRUCTIONS:    After general anesthesia or intravenous sedation, for 24 hours or while taking prescription Narcotics:  · Limit your activities  · Do not drive and operate hazardous machinery  · Do not make important personal or business decisions  · Do  not drink alcoholic beverages  · If you have not urinated within 8 hours after discharge, please contact your surgeon on call. Report the following to your surgeon:  · Excessive pain, swelling, redness or odor of or around the surgical area  · Temperature over 100.5  · Nausea and vomiting lasting longer than 4 hours or if unable to take medications  · Any signs of decreased circulation or nerve impairment to extremity: change in color, persistent  numbness, tingling, coldness or increase pain  · Any questions        What to do at Home:  Recommended activity: Activity as tolerated. Out of bed to recliner. If you experience any of the following symptoms fever 101 or greater, nausea, vomiting, diarrhea, shortness of breath, any pain, any problems or concerns. *  Please give a list of your current medications to your Primary Care Provider. *  Please update this list whenever your medications are discontinued, doses are      changed, or new medications (including over-the-counter products) are added. *  Please carry medication information at all times in case of emergency situations. These are general instructions for a healthy lifestyle:    No smoking/ No tobacco products/ Avoid exposure to second hand smoke    Surgeon General's Warning:  Quitting smoking now greatly reduces serious risk to your health.     Obesity, smoking, and sedentary lifestyle greatly increases your risk for illness    A healthy diet, regular physical exercise & weight monitoring are important for maintaining a healthy lifestyle    You may be retaining fluid if you have a history of heart failure or if you experience any of the following symptoms:  Weight gain of 3 pounds or more overnight or 5 pounds in a week, increased swelling in our hands or feet or shortness of breath while lying flat in bed. Please call your doctor as soon as you notice any of these symptoms; do not wait until your next office visit. Recognize signs and symptoms of STROKE:    F-face looks uneven    A-arms unable to move or move unevenly    S-speech slurred or non-existent    T-time-call 911 as soon as signs and symptoms begin-DO NOT go       Back to bed or wait to see if you get better-TIME IS BRAIN. Warning Signs of HEART ATTACK     Call 911 if you have these symptoms:   Chest discomfort. Most heart attacks involve discomfort in the center of the chest that lasts more than a few minutes, or that goes away and comes back. It can feel like uncomfortable pressure, squeezing, fullness, or pain.  Discomfort in other areas of the upper body. Symptoms can include pain or discomfort in one or both arms, the back, neck, jaw, or stomach.  Shortness of breath with or without chest discomfort.  Other signs may include breaking out in a cold sweat, nausea, or lightheadedness. Don't wait more than five minutes to call 911 - MINUTES MATTER! Fast action can save your life. Calling 911 is almost always the fastest way to get lifesaving treatment. Emergency Medical Services staff can begin treatment when they arrive -- up to an hour sooner than if someone gets to the hospital by car. The discharge information has been reviewed with the patient. The patient verbalized understanding.     Discharge medications reviewed with the patient and appropriate educational materials and side effects teaching were provided. Patient armband removed and shredded  MyChart Activation    Thank you for requesting access to [employer]. Please follow the instructions below to securely access and download your online medical record. [employer] allows you to send messages to your doctor, view your test results, renew your prescriptions, schedule appointments, and more. How Do I Sign Up? 1. In your internet browser, go to [email or web address]  2. Click on the First Time User? Click Here link in the Sign In box. You will be redirect to the New Member Sign Up page. 3. Enter your [employer] Access Code exactly as it appears below. You will not need to use this code after youve completed the sign-up process. If you do not sign up before the expiration date, you must request a new code. [employer] Access Code: Activation code not generated  Current [employer] Status: Patient Declined (This is the date your [employer] access code will )    4. Enter the last four digits of your Social Security Number (xxxx) and Date of Birth (mm/dd/yyyy) as indicated and click Submit. You will be taken to the next sign-up page. 5. Create a [employer] ID. This will be your [employer] login ID and cannot be changed, so think of one that is secure and easy to remember. 6. Create a [employer] password. You can change your password at any time. 7. Enter your Password Reset Question and Answer. This can be used at a later time if you forget your password. 8. Enter your e-mail address. You will receive e-mail notification when new information is available in [address]. 9. Click Sign Up. You can now view and download portions of your medical record. 10. Click the Download Summary menu link to download a portable copy of your medical information. Additional Information    If you have questions, please visit the Frequently Asked Questions section of the [employer] website at [email or web address] Remember, [employer] is NOT to be used for urgent needs. For medical emergencies, dial 911. Diet- Cardiac,  Activity - as tolerated  FALL PRECAUTIONS  ASPIRATION PRECAUTIONS  DECUBITUS PRECAUTIONS  Incentive Spirometry Q30 minutes when awake  PT, OT Evaluate and Treat  Check daily PT, INR.    Check CBC, CMP, Mg in 3 days  F/u with PCP in 1 week  F/u with GI in 2 weeks  F/u with Cardiologist in 2 weeks

## 2017-09-16 NOTE — PROGRESS NOTES
Bedside and Verbal shift change report given to   [address] RN (oncoming nurse) by Erica Chain nurse). Report included the following information SBAR, Kardex, MAR and Recent Results.      SITUATION: ·   Code Status: Full Code  · Reason for Admission: GI bleed  · Acute febrile illness  · Generalized weakness  · Sepsis ([facility] [address]      · Hospital day: 5  · Problem List:   [place] Date Reviewed: 8/3/2017                                     Codes Class Noted POA                          GI bleed ICD-10-CM: K92.2  ICD-9-CM: 057. 9    9/9/2017 Unknown                  Generalized weakness ICD-10-CM: R53.1  ICD-9-CM: 780.79    9/9/2017 Unknown                  Acute febrile illness ICD-10-CM: R50.9  ICD-9-CM: 780.60    9/9/2017 Unknown                  Sepsis ([facility] [address] ICD-10-CM: A41.9  ICD-9-CM: 038.9, 995.91    9/9/2017 Unknown                  Anemia ICD-10-CM: D64.9  ICD-9-CM: 695. 9    5/20/2014 Yes                  Chronic diastolic heart failure (HCC) ICD-10-CM: I50.32  ICD-9-CM: 428.32    Unknown Yes        Overview Addendum 2/3/2017 11:44 AM by Bard Jason MD           2/17; 11/16; 8/16 [id number];   Stable symptoms;  11/16 much better chr edema                      Chest pain, unspecified ICD-10-CM: R07.9  ICD-9-CM: 786.50    Unknown Yes        Overview Addendum 4/26/2013 12:16 PM by Bard Jason MD           Likely musculoskeletal/pleurotoc; will Rx medically now and consider cath in future if needed                      Chronic kidney disease, unspecified ICD-10-CM: N18.9  ICD-9-CM: 438. 9    Unknown Yes                  Paroxysmal atrial fibrillation (HCC) ICD-10-CM: I48.0  ICD-9-CM: 427.31    Unknown Yes        Overview Addendum 2/3/2017 11:43 AM by Bard Jason MD           2/17 occ palp but SR on EKG; 11/16 SR on rythmol  pt in SR since 3/13 atleast  5/14 amio d/thomas due to hyperthyroid; 4/15 nl TFT; 5/15; 10/15 SR on rhythmol  chr warfarin due to recurrent DVT; f/u INR PCP                                BACKGROUND:                         Past Medical History:           Past Medical History:   Diagnosis Date    A-fib ([address]       Aortic valve disorders 8/31/2015      7/15 mild AS     Arrhythmia         a-fib     Arthritis       Autoimmune disease ([address]         lupus 2001  Reynauds disease  S[place]rens    CAD (coronary artery disease)         AFIB  2011 Dr Maebl Waldrop Chest pain, unspecified         Poss atyp angina, will Rx medically now and consider cath in future if needed    Chronic diastolic heart failure ([address]         Stable symptoms    Chronic kidney disease       Chronic kidney disease, unspecified       Chronic venous embolism and thrombosis of unspecified deep vessels of lower extremity         8/11    Congestive heart failure, unspecified       Edema         improving    Essential hypertension       Hyperlipidemia       Hypertension       Lupus ([address]       Mitral regurgitation and mitral stenosis 10/29/2015      7/15 mild     Mitral valve disorders 8/31/2015      7/15 mild MS/MR     Pancytopenia (HCC)       Paroxysmal atrial fibrillation (HCC)       Rheumatoid arthritis ([address]                                    Patient taking anticoagulants no       ASSESSMENT: ¨   Changes in Assessment Throughout Shift: none      ¨ Patient has Central Line: no Reasons if yes:   ¨ Patient has Epperson Cath: no Reasons if yes:        ¨ Last Vitals:                  Vitals:      09/13/17 2000 09/14/17 0000 09/14/17 0400 09/14/17 0512   BP: 146/76 144/72 136/70      Pulse: 76 76 76      Resp: 18 18 18      Temp: 98.2 °F (36.8 °C) 99.5 °F (37.5 °C) 99.4 °F (37.4 °C)      SpO2: 97% 96% 97%      Weight:          79.2 kg (174 lb 8 oz)   Height:                       ¨ IV and DRAINS (will only show if present)                        [REMOVED] Peripheral IV 09/09/17 Left Hand-Site Assessment: Clean, dry, & intact  [REMOVED] Peripheral IV 09/08/17 Left Antecubital-Site Assessment: Clean, dry, & intact  [REMOVED] Peripheral IV 09/10/17 Right Forearm-Site Assessment: Clean, dry, & intact  Peripheral IV 09/14/17-Site Assessment: Clean, dry, & intact      ¨ WOUND (if present)                        Wound Type:  none                        Dressing present Dressing Present : No                        Wound Concerns/Notes:  none      · PAIN                                              Pain Assessment                                              Pain Intensity 1: 0 (09/14/17 0400)                                                    Patient Stated Pain Goal: 0  ¨ Interventions for Pain:  NO STATED PAIN  ¨ Intervention effective: YES  ¨ Time of last intervention:  Denied pain. ¨ Reassessment Completed: YES       · Last 3 Weights:            Last 3 Recorded Weights in this Encounter      09/11/17 1035 09/12/17 2118 09/14/17 0512   Weight: 79.4 kg (175 lb) 79.6 kg (175 lb 6.4 oz) 79.2 kg (174 lb 8 oz)       Weight change: -0.408 kg (-14.4 oz)      · INTAKE/OUPUT                                              Current Shift:                                                Last three shifts: 09/12 1901 - 09/14 0700  In: 240 [P.O.:240]  Out: -       · LAB RESULTS                Recent Labs       09/14/17   0400  09/13/17   0328  09/12/17   0400   WBC  5.4  5.1  4.1*   HGB  8.6*  8.8*  8.6*   HCT  29.9*  30.0*  29.3*   PLT  278  248  238                     Recent Labs       09/14/17   0400  09/13/17   0328  09/12/17   0400   NA  143  144  145   K  3.6  3.4*  3.5   GLU  76  92  83   BUN  20*  19*  20*   CREA  1.55*  1.77*  1.63*   CA  8.0*  8.3*  8.3*   INR  3.7*  3.4*  3.0*           RECOMMENDATIONS AND DISCHARGE PLANNING       1. Pending tests/procedures/ Plan of Care or Other Needs: Pending Echo;Possible repeat of Nuclear Stress Test       2. Discharge plan for patient and Needs/Barriers: TBD      3. Estimated Discharge Date: 9/15/17 Posted on Whiteboard in Hospitals in [place]: yes        4.  The patient's care plan was reviewed with the oncoming nurse.       \"HEALS\" SAFETY CHECK                             Fall Risk                         Total Score: 4                         Safety Measures: Safety Measures: Bed/Chair alarm on, Bed/Chair-Wheels locked, Bed in low position, Call light within reach, Fall prevention (comment), Gripper socks, Side rails X 3      A safety check occurred in the patient's room between off going nurse and oncoming nurse listed above.      The safety check included the below items  Area Items   H  High Alert Medications § Verify all high alert medication drips (heparin, PCA, etc.)   E  Equipment § Suction is set up for ALL patients (with tiera)  § Red plugs utilized for all equipment (IV pumps, etc.)  § WOWs wiped down at end of shift. § Room stocked with oxygen, suction, and other unit-specific supplies   A  Alarms § Bed alarm is set for fall risk patients  § Ensure chair alarm is in place and activated if patient is up in a chair   L  Lines § Check IV for any infiltration  § Epperson bag is empty if patient has a Epperson   § Tubing and IV bags are labeled   S  Safety § Room is clean, patient is clean, and equipment is clean. § Hallways are clear from equipment besides carts. § Fall bracelet on for fall risk patients  § Ensure room is clear and free of clutter  § Suction is set up for ALL patients (with tiera)  § Hallways are clear from equipment besides carts.    § Isolation precautions followed, supplies available outside room, sign posted

## 2017-09-20 NOTE — PROGRESS NOTES
Community Care Team Documentation for Patient in [facility]     Patient discharged from [facility] 9/8/2017 - 9/16/2017 to [facility], on 9/16/2017. Hospital Discharge diagnosis:  GI bleed. SNF Attending Provider: Debbie Bernard    Anticipated discharge date from SNF:  TBD, anticipated LOS 4 weeks. PCP : Hillary Rich MD    Nurse Navigator:     CCT rounds completed, updates provided by facility. Po vanco for cdiff. Contact precautions. PT/OT/SP:participating. Coumadin INR 6.7, Vit K given, next INR today. Medium Risk            16       Total Score        3 Has Seen PCP in Last 6 Months (Yes=3, No=0)    13 Charlson Comorbidity Score (Age + Comorbid Conditions)        Criteria that do not apply:    . Living with Significant Other. Assisted Living. LTAC. SNF. or   Rehab    Patient Length of Stay (>5 days = 3)    IP Visits Last 12 Months (1-3=4, 4=9, >4=11)    Pt.  Coverage (Medicare=5 , Medicaid, or Self-Pay=4)      Active Ambulatory Problems     Diagnosis Date Noted    Chronic diastolic heart failure (HCC)     Chest pain, unspecified     Essential hypertension, benign     Edema     Chronic kidney disease, unspecified     Lupus ([address]     Chronic venous embolism and thrombosis of deep vessels of lower extremity (HCC)     Rheumatoid arthritis (HCC)     Paroxysmal atrial fibrillation (HCC)     SOB (shortness of breath)     Chronic kidney disease     Arthritis     Hypertension     Autoimmune disease ([address]     Memory loss 07/15/2013    Ataxic gait 07/15/2013    Polyneuropathy ([address] 07/15/2013    Recurrent falls 07/15/2013    Lupus (systemic lupus erythematosus) ([address] 08/23/2013    Sjogren's syndrome ([address] 08/23/2013    High risk medication use 08/23/2013    Pancytopenia ([address] 08/23/2013    Lumbosacral spinal stenosis 08/23/2013    Spinal stenosis of lumbosacral region 10/08/2013    Other and unspecified angina pectoris 04/30/2014    Coronary atherosclerosis of native coronary artery 04/30/2014    Anemia 05/20/2014    DVT (deep venous thrombosis) (HCC) 06/13/2014    Weakness 10/02/2014    Aortic stenosis 08/31/2015    Mitral regurgitation and mitral stenosis 10/29/2015    Pulmonary HTN ([facility] [facility] 08/03/2016    Hyperlipidemia 08/03/2016    Acute on chronic diastolic congestive heart failure ([facility] 08/03/2017    GI bleed 09/09/2017    Generalized weakness 09/09/2017    Acute febrile illness 09/09/2017    Sepsis ([facility] 09/09/2017     Resolved Ambulatory Problems     Diagnosis Date Noted    No Resolved Ambulatory Problems     Past Medical History:   Diagnosis Date    A-fib [facility])     Aortic valve disorders 8/31/2015    Arrhythmia     Arthritis     Autoimmune disease ([facility]     CAD (coronary artery disease)     Chest pain, unspecified     Chronic diastolic heart failure (HCC)     Chronic kidney disease     Chronic kidney disease, unspecified     Chronic venous embolism and thrombosis of unspecified deep vessels of lower extremity     Congestive heart failure, unspecified     Edema     Essential hypertension     Hyperlipidemia     Hypertension     Lupus (HCC)     Mitral regurgitation and mitral stenosis 10/29/2015    Mitral valve disorders 8/31/2015    Pancytopenia (HCC)     Paroxysmal atrial fibrillation (HCC)     Rheumatoid arthritis ([facility]

## 2017-09-27 NOTE — PROGRESS NOTES
Community Care Team Documentation for Patient in [facility]     Patient discharged from [facility] 9/8/2017 - 9/16/2017 to [facility], on 9/16/2017. Hospital Discharge diagnosis:  GI bleed. SNF Attending Provider: Debbie Bernard    Anticipated discharge date from SNF:  TBD, anticipated LOS 4 weeks. PCP : Hillary Rich MD    Nurse Navigator:     CCT rounds completed, updates provided by facility. Continues with diarrhea, weakness. Progressing. VSS, signs of depression. Medium Risk            16       Total Score        3 Has Seen PCP in Last 6 Months (Yes=3, No=0)    13 Charlson Comorbidity Score (Age + Comorbid Conditions)        Criteria that do not apply:    . Living with Significant Other. Assisted Living. LTAC. SNF. or   Rehab    Patient Length of Stay (>5 days = 3)    IP Visits Last 12 Months (1-3=4, 4=9, >4=11)    Pt.  Coverage (Medicare=5 , Medicaid, or Self-Pay=4)      Active Ambulatory Problems     Diagnosis Date Noted    Chronic diastolic heart failure (HCC)     Chest pain, unspecified     Essential hypertension, benign     Edema     Chronic kidney disease, unspecified     Lupus ([address]     Chronic venous embolism and thrombosis of deep vessels of lower extremity (HCC)     Rheumatoid arthritis (HCC)     Paroxysmal atrial fibrillation (HCC)     SOB (shortness of breath)     Chronic kidney disease     Arthritis     Hypertension     Autoimmune disease ([address]     Memory loss 07/15/2013    Ataxic gait 07/15/2013    Polyneuropathy ([address] 07/15/2013    Recurrent falls 07/15/2013    Lupus (systemic lupus erythematosus) ([address] 08/23/2013    Sjogren's syndrome ([address] 08/23/2013    High risk medication use 08/23/2013    Pancytopenia ([address] 08/23/2013    Lumbosacral spinal stenosis 08/23/2013    Spinal stenosis of lumbosacral region 10/08/2013    Other and unspecified angina pectoris 04/30/2014    Coronary atherosclerosis of native coronary artery 04/30/2014    Anemia 05/20/2014    [facility] 06/13/2014    Weakness 10/02/2014    Aortic stenosis 08/31/2015    Mitral regurgitation and mitral stenosis 10/29/2015    Pulmonary HTN ([facility] 08/03/2016    Hyperlipidemia 08/03/2016    Acute on chronic diastolic congestive heart failure (HCC) 08/03/2017    GI bleed 09/09/2017    Generalized weakness 09/09/2017    Acute febrile illness 09/09/2017    Sepsis ([facility] 09/09/2017     Resolved Ambulatory Problems     Diagnosis Date Noted    No Resolved Ambulatory Problems     Past Medical History:   Diagnosis Date    [facility]-fib [facility])     Aortic valve disorders 8/31/2015    Arrhythmia     Arthritis     Autoimmune disease ([facility]     CAD (coronary artery disease)     Chest pain, unspecified     Chronic diastolic heart failure (HCC)     Chronic kidney disease     Chronic kidney disease, unspecified     Chronic venous embolism and thrombosis of unspecified deep vessels of lower extremity     Congestive heart failure, unspecified     Edema     Essential hypertension     Hyperlipidemia     Hypertension     Lupus (HCC)     Mitral regurgitation and mitral stenosis 10/29/2015    Mitral valve disorders 8/31/2015    Pancytopenia (HCC)     Paroxysmal atrial fibrillation (HCC)     Rheumatoid arthritis ([facility]

## 2017-10-16 NOTE — PROGRESS NOTES
Community Care Team Documentation for Patient in [facility]     Patient discharged from [facility] 9/8/2017 - 9/16/2017 to [facility], on 9/16/2017. Hospital Discharge diagnosis:  GI bleed. SNF Attending Provider: Gilma Cerna    Anticipated discharge date from SNF:  TBD, anticipated LOS 4 weeks. PCP : Nia Carranza MD    Nurse Navigator:     CCT rounds completed, updates provided by facility. BM better, decreased participation in therapy, wants to go home. Insurance updates due tomorrow. Medium Risk            16       Total Score        3 Has Seen PCP in Last 6 Months (Yes=3, No=0)    13 Charlson Comorbidity Score (Age + Comorbid Conditions)        Criteria that do not apply:    . Living with Significant Other. Assisted Living. LTAC. SNF. or   Rehab    Patient Length of Stay (>5 days = 3)    IP Visits Last 12 Months (1-3=4, 4=9, >4=11)    Pt.  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)     Rheumatoid arthritis(714.0)     Paroxysmal atrial fibrillation (HCC)     SOB (shortness of breath)     Chronic kidney disease     Arthritis     Hypertension     Autoimmune disease ([address]     Memory loss 07/15/2013    Ataxic gait 07/15/2013    Polyneuropathy 07/15/2013    Recurrent falls 07/15/2013    Lupus (systemic lupus erythematosus) ([address] 08/23/2013    Sjogren's syndrome ([address] 08/23/2013    High risk medication use 08/23/2013    Pancytopenia ([address] 08/23/2013    Lumbosacral spinal stenosis 08/23/2013    Spinal stenosis of lumbosacral region 10/08/2013    Other and unspecified angina pectoris 04/30/2014    Coronary atherosclerosis of native coronary artery 04/30/2014    Anemia 05/20/2014    D[facility] thrombosis) ([facility]) 06/13/2014    Weakness 10/02/2014    Aortic stenosis 08/31/2015    Mitral regurgitation and mitral stenosis 10/29/2015    Pulmonary HTN 08/03/2016    Hyperlipidemia 08/03/2016    Acute on chronic diastolic congestive heart failure (HCC) 08/03/2017    GI bleed 09/09/2017    Generalized weakness 09/09/2017    Acute febrile illness 09/09/2017    Sepsis ([facility] 09/09/2017     Resolved Ambulatory Problems     Diagnosis Date Noted    No Resolved Ambulatory Problems     Past Medical History:   Diagnosis Date    A-fib [facility])     Aortic valve disorders 8/31/2015    Arrhythmia     Arthritis     Autoimmune disease ([facility]     CAD (coronary artery disease)     Chest pain, unspecified     Chronic diastolic heart failure (HCC)     Chronic kidney disease     Chronic kidney disease, unspecified     Chronic venous embolism and thrombosis of unspecified deep vessels of lower extremity     Congestive heart failure, unspecified     Edema     Essential hypertension     Hyperlipidemia     Hypertension     Lupus (HCC)     Mitral regurgitation and mitral stenosis 10/29/2015    Mitral valve disorders 8/31/2015    Pancytopenia (HCC)     Paroxysmal atrial fibrillation (HCC)     Rheumatoid arthritis ([facility]

## 2017-10-27 NOTE — PROGRESS NOTES
Community Care Team Documentation for Patient in [facility]     Patient discharged from [facility] 9/8/2017 - 9/16/2017 to [facility], on 9/16/2017. Hospital Discharge diagnosis:  GI bleed. SNF Attending Provider: Mitch Gupta    Anticipated discharge date from SNF:  TBD, anticipated LOS 4 weeks. PCP : Jah Gomez MD    Nurse Navigator:     CCT rounds completed, updates provided by facility. Won appeal for insurance. Plateaued with therapy. Dc 10/27. Patients home, brother lives with her. ? if brother can adequately care for patient. Request for SW consult with Shantel Goldsmith orders. + Depression. Medically stable. No effort with therapy. Medium Risk            16       Total Score        3 Has Seen PCP in Last 6 Months (Yes=3, No=0)    13 Charlson Comorbidity Score (Age + Comorbid Conditions)        Criteria that do not apply:    . Living with Significant Other. Assisted Living. LTAC. SNF. or   Rehab    Patient Length of Stay (>5 days = 3)    IP Visits Last 12 Months (1-3=4, 4=9, >4=11)    Pt.  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)     Rheumatoid arthritis(714.0)     Paroxysmal atrial fibrillation (HCC)     SOB (shortness of breath)     Chronic kidney disease     Arthritis     Hypertension     Autoimmune disease ([address]     Memory loss 07/15/2013    Ataxic gait 07/15/2013    Polyneuropathy 07/15/2013    Recurrent falls 07/15/2013    Lupus (systemic lupus erythematosus) ([address] 08/23/2013    Sjogren's syndrome ([address] 08/23/2013    High risk medication use 08/23/2013    Pancytopenia ([address] 08/23/2013    Lumbosacral spinal stenosis 08/23/2013    Spinal stenosis of lumbosacral region 10/08/2013    Other and unspecified angina pectoris 04/30/2014    Coronary atherosclerosis of native coronary artery 04/30/2014    Anemia 05/20/2014    DVT [facility] thrombosis) ([facility]) 06/13/2014    Weakness 10/02/2014    Aortic stenosis 08/31/2015    Mitral regurgitation and mitral stenosis 10/29/2015    Pulmonary HTN 08/03/2016    Hyperlipidemia 08/03/2016    Acute on chronic diastolic congestive heart failure ([facility] 08/03/2017    GI bleed 09/09/2017    Generalized weakness 09/09/2017    Acute febrile illness 09/09/2017    Sepsis ([facility] 09/09/2017     Resolved Ambulatory Problems     Diagnosis Date Noted    No Resolved Ambulatory Problems     Past Medical History:   Diagnosis Date    A-fib [facility])     Aortic valve disorders 8/31/2015    Arrhythmia     Arthritis     Autoimmune disease ([facility]     CAD (coronary artery disease)     Chest pain, unspecified     Chronic diastolic heart failure (HCC)     Chronic kidney disease     Chronic kidney disease, unspecified     Chronic venous embolism and thrombosis of unspecified deep vessels of lower extremity     Congestive heart failure, unspecified     Edema     Essential hypertension     Hyperlipidemia     Hypertension     Lupus (HCC)     Mitral regurgitation and mitral stenosis 10/29/2015    Mitral valve disorders 8/31/2015    Pancytopenia (HCC)     Paroxysmal atrial fibrillation (HCC)     Rheumatoid arthritis ([facility]

## 2017-10-27 NOTE — PROGRESS NOTES
Community Care Team Documentation for Patient in [facility]     Patient discharged from [facility] 9/8/2017 - 9/16/2017 to [facility] on 9/16/2017. Hospital Discharge diagnosis:  GI bleed. SNF Attending Provider: Casper Garnett    Anticipated discharge date from SNF:  TBD, anticipated LOS 4 weeks. PCP : Denton Khalil MD    Nurse Navigator:     CCT rounds completed, updates provided by facility. Participation remains low. Needs to be able to do more to go home. Awaiting [employer] updates for LOS. Discharge may be delayed due to lack of safe dc plan. Psych consult pending. Medium Risk            16       Total Score        3 Has Seen PCP in Last 6 Months (Yes=3, No=0)    13 Charlson Comorbidity Score (Age + Comorbid Conditions)        Criteria that do not apply:    . Living with Significant Other. Assisted Living. LTAC. SNF. or   Rehab    Patient Length of Stay (>5 days = 3)    IP Visits Last 12 Months (1-3=4, 4=9, >4=11)    Pt.  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)     Rheumatoid arthritis(714.0)     Paroxysmal atrial fibrillation (HCC)     SOB (shortness of breath)     Chronic kidney disease     Arthritis     Hypertension     Autoimmune disease ([address]     Memory loss 07/15/2013    Ataxic gait 07/15/2013    Polyneuropathy 07/15/2013    Recurrent falls 07/15/2013    Lupus (systemic lupus erythematosus) ([address] 08/23/2013    Sjogren's syndrome ([address] 08/23/2013    High risk medication use 08/23/2013    Pancytopenia ([address] 08/23/2013    Lumbosacral spinal stenosis 08/23/2013    Spinal stenosis of lumbosacral region 10/08/2013    Other and unspecified angina pectoris 04/30/2014    Coronary atherosclerosis of native coronary artery 04/30/2014    Anemia 05/20/2014    DVT (deep venous thrombosis) (HCC) 06/13/2014    Weakness 10/02/2014    Aortic stenosis 08/31/2015    Mitral regurgitation and mitral stenosis 10/29/2015    Pulmonary HTN 08/03/2016    Hyperlipidemia 08/03/2016    Acute on chronic diastolic congestive heart failure ([facility] 08/03/2017    GI bleed 09/09/2017    Generalized weakness 09/09/2017    Acute febrile illness 09/09/2017    Sepsis ([facility] 09/09/2017     Resolved Ambulatory Problems     Diagnosis Date Noted    No Resolved Ambulatory Problems     Past Medical History:   Diagnosis Date    A-fib [facility])     Aortic valve disorders 8/31/2015    Arrhythmia     Arthritis     Autoimmune disease ([facility]     CAD (coronary artery disease)     Chest pain, unspecified     Chronic diastolic heart failure (HCC)     Chronic kidney disease     Chronic kidney disease, unspecified     Chronic venous embolism and thrombosis of unspecified deep vessels of lower extremity     Congestive heart failure, unspecified     Edema     Essential hypertension     Hyperlipidemia     Hypertension     Lupus (HCC)     Mitral regurgitation and mitral stenosis 10/29/2015    Mitral valve disorders 8/31/2015    Pancytopenia (HCC)     Paroxysmal atrial fibrillation (HCC)     Rheumatoid arthritis ([facility]

## 2018-01-11 NOTE — MR AVS SNAPSHOT
Visit Information Date & Time Provider Department Dept. Phone Encounter #  
 2/3/2017 10:30 AM Reji Crespo MD [facility] [address] [place] [address] [id number] Follow-up Instructions Return in about 6 months (around 8/3/2017), or if symptoms worsen or fail to improve. Your Appointments 2/27/2017 10:15 AM  
ESTABLISHED PATIENT with Reji Crespo MD  
[facility]) Appt Note: 3 months post labs with EKG  
 [address] [facility], [address] [facility] [address]  
[address], [address] [address]  
  
    
 8/3/2017 11:00 AM  
Office Visit with Reji Crespo MD  
[facility]) Appt Note: [address] [facility], [address] [facility] [address]  
[address], [address] [address] Upcoming Health Maintenance Date Due Hepatitis C Screening 1945 DTaP/Tdap/Td series (1 - Tdap) 12/12/1968 BREAST CANCER SCRN MAMMOGRAM 12/12/1997 ZOSTER VACCINE AGE 60> 12/12/2007 GLAUCOMA SCREENING Q2Y 12/12/2012 OSTEOPOROSIS SCREENING (DEXA) 12/12/2012 Pneumococcal 65+ High/Highest Risk (1 of 2 - PCV13) 12/12/2012 MEDICARE YEARLY EXAM 12/12/2012 FOBT Q 1 YEAR AGE 50-75 8/9/2014 INFLUENZA AGE 9 TO ADULT 8/1/2016 Allergies as of 2/3/2017  Review Complete On: 2/3/2017 By: Amanda Ca LPN Severity Noted Reaction Type Reactions Latex  07/28/2014    Itching Adhesive High 12/26/2012   Topical Other (comments) Blisters skin Amoxicillin High 12/26/2012   Systemic Hives Bactrim [Sulfamethoprim] High 12/26/2012   Side Effect Nausea Only Current Immunizations  Never Reviewed No immunizations on file. Not reviewed this visit You Were Diagnosed With   
  
 Codes Comments Paroxysmal atrial fibrillation (HCC)    -  Primary ICD-10-CM: I48.0 ICD-9-CM: 427.31 2/17 occ palp but SR on EKG; 11/16 SR on rythmol Chronic diastolic heart failure (HCC)     ICD-10-CM: I50.32 
ICD-9-CM: 428.32 2/17; 11/16; 8/16 PCHM3;   
 Atherosclerosis of native coronary artery of native heart without angina pectoris     ICD-10-CM: I25.10 ICD-9-CM: 414.01 4/14 abnormal stress test with lat ischemia; med Rx Essential hypertension, benign     ICD-10-CM: I10 
ICD-9-CM: 401.1 2/17; 11/16; 8/16 controlled Nonrheumatic aortic valve stenosis     ICD-10-CM: I35.0 ICD-9-CM: 424.1 2/17; 11/16 stable symptoms; Nonrheumatic mitral valve stenosis with insufficiency     ICD-10-CM: I34.2, I34.0 ICD-9-CM: 394.2 7/16 mild MR, mild/mod MS(mean grad 6); 7/15 mild Pulmonary HTN ([facility] [address]     ICD-10-CM: I27.2 ICD-9-CM: 416.8 7/16 PAP45; 7/15 PAP 57 Mixed hyperlipidemia     ICD-10-CM: E78.2 ICD-9-CM: 272.2 12/16 TDS77, ; 9/16 ILE52, ; 3/16 ZFR312, ;  
  
 Lupus ([facility] [address]     ICD-10-CM: M32.9 ICD-9-CM: 710.0 Vitals BP Pulse Height(growth percentile) Weight(growth percentile) BMI OB Status 134/65 (BP 1 Location: Left arm, BP Patient Position: At rest) 73 5' 6\" (1.676 m) 156 lb (70.8 kg) 25.18 kg/m2 Hysterectomy Smoking Status Never Smoker Vitals History BMI and BSA Data Body Mass Index Body Surface Area  
 25.18 kg/m 2 1.82 m 2 Preferred Pharmacy Pharmacy Name Phone [address], [place] [address] [facility] [phone] Your Updated Medication List  
  
   
This list is accurate as of: 2/3/17 11:53 AM.  Always use your most recent med list.  
  
  
  
  
 acetaminophen 325 mg tablet Commonly known as:  TYLENOL Take 650 mg by mouth every four (4) hours as needed for Pain. amLODIPine 5 mg tablet Commonly known as:  Maynor Wheeler Take 1 Tab by mouth daily. cholecalciferol 1,000 unit Cap Commonly known as:  VITAMIN D3  
 Take 2,000 Units by mouth daily. clotrimazole-betamethasone topical cream  
Commonly known as:  Alphonsa Spies Apply  to affected area two (2) times a day. famotidine 40 mg tablet Commonly known as:  PEPCID Take 1 Tab by mouth two (2) times a day. Fesoterodine 4 mg SR tablet Commonly known as:  [employer] Take 8 mg by mouth daily. furosemide 40 mg tablet Commonly known as:  LASIX Take 1 Tab by mouth three (3) times daily. Take 2 tabs(80mg) in am and 1 tab 40 mg  at 2pm. May skip PM lasix if no edema/SOB  
  
 gabapentin 100 mg capsule Commonly known as:  NEURONTIN Take 200 mg by mouth nightly. hydrALAZINE 10 mg tablet Commonly known as:  APRESOLINE  
TAKE 1 TABLET THREE TIMES A DAY IMURAN 50 mg tablet Generic drug:  azaTHIOprine Take 50 mg by mouth two (2) times a day. isosorbide mononitrate ER 60 mg CR tablet Commonly known as:  IMDUR Take 60 mg by mouth every morning. loperamide 2 mg capsule Commonly known as:  IMODIUM Take  by mouth as needed. LOPRESSOR 50 mg tablet Generic drug:  metoprolol tartrate Take 50 mg by mouth two (2) times a day. metOLazone 2.5 mg tablet Commonly known as:  Forestine Blew Take 2.5 mg by mouth as needed. predniSONE 5 mg tablet Commonly known as:  Nanine Knife Take 5 mg by mouth two (2) times a day. propafenone 225 mg tablet Commonly known as:  RYTHMOL Take 1 Tab by mouth three (3) times daily. rosuvastatin 40 mg tablet Commonly known as:  CRESTOR Take 1 Tab by mouth nightly. traMADol 50 mg tablet Commonly known as:  ULTRAM  
Take 50 mg by mouth every six (6) hours as needed for Pain.  
  
 triamcinolone acetonide 0.1 % ointment Commonly known as:  KENALOG Apply  to affected area two (2) times a day. use thin layer  
  
 warfarin 1 mg tablet Commonly known as:  COUMADIN Take 1 Tab by mouth daily. ZOLOFT 50 mg tablet Generic drug:  sertraline Take 100 mg by mouth daily. We Performed the Following AMB POC EKG ROUTINE W/ 12 LEADS, INTER & REP [52889 CPT(R)] Follow-up Instructions Return in about 6 months (around 8/3/2017), or if symptoms worsen or fail to improve. Patient Instructions There are no discontinued medications. Orders Placed This Encounter  AMB POC EKG ROUTINE W/ 12 LEADS, INTER & REP Order Specific Question:   Reason for Exam: Answer:   a fib Aortic Valve Stenosis: Care Instructions Your Care Instructions Having aortic valve stenosis means that the valve between your heart and the large blood vessel that carries blood to the body (aorta) has narrowed. That forces the heart to pump harder to get enough blood through the valve. As stenosis gets worse, the valve gets narrower. This can cause symptoms. Symptoms include chest pain, dizziness, fainting, or shortness of breath. Surgery can fix the valve. The most common surgery is to replace the aortic valve. Your doctor may want to delay valve replacement until you have severe narrowing. You may take medicine to prevent or treat problems caused by aortic valve stenosis. Follow-up care is a key part of your treatment and safety. Be sure to make and go to all appointments, and call your doctor if you are having problems. It's also a good idea to know your test results and keep a list of the medicines you take. How can you care for yourself at home? · Be safe with medicines. Take your medicines exactly as prescribed. Call your doctor if you think you are having a problem with your medicine. You will get more details on the specific medicines your doctor prescribes. · Plan your meals so that you are eating heart-healthy foods. ¨ Eat a variety of foods daily. Fresh fruits and vegetables and whole grains are good choices. ¨ Limit your fat intake, especially saturated and trans fat. ¨ Limit salt (sodium). ¨ Increase fiber in your diet. ¨ Limit alcohol. · Be active. Ask your doctor what type and level of exercise is safe for you. Walking is a good choice. Your doctor may suggest that you join a cardiac rehabilitation program so that you can have help increasing your physical activity safely. · Do not smoke. Smoking can make aortic valve stenosis worse. If you need help quitting, talk to your doctor about stop-smoking programs and medicines. These can increase your chances of quitting for good. · Stay at a healthy weight. Lose weight if you need to. · Avoid colds and flu. Get a pneumococcal vaccine shot. If you have had one before, ask your doctor if you need another dose. Get a flu vaccine every year. · Take care of your teeth and gums. Get regular dental checkups. Good dental health is important because bacteria can spread from infected teeth and gums to the heart valves. When should you call for help? Call 911 anytime you think you may need emergency care. For example, call if: 
· You passed out (lost consciousness). · You have symptoms of a heart attack. These may include: ¨ Chest pain or pressure, or a strange feeling in the chest. 
¨ Sweating. ¨ Shortness of breath. ¨ Nausea or vomiting. ¨ Pain, pressure, or a strange feeling in the back, neck, jaw, or upper belly or in one or both shoulders or arms. ¨ Lightheadedness or sudden weakness. ¨ A fast or irregular heartbeat. After you call 911, the  may tell you to chew 1 adult-strength or 2 to 4 low-dose aspirin. Wait for an ambulance. Do not try to drive yourself. Call your doctor now or seek immediate medical care if: 
· You develop new symptoms of aortic valve stenosis, such as chest pain, dizziness, or shortness of breath. · You have new or increased shortness of breath. · You are dizzy or lightheaded, or you feel like you may faint. · You have sudden weight gain, such as 3 pounds or more in 2 to 3 days. · You have increased swelling in your legs, ankles, or feet. Watch closely for changes in your health, and be sure to contact your doctor if: 
· You have trouble making healthy lifestyle changes. · You want more information about healthy lifestyle changes. Where can you learn more? Go to http://cezar-pamela.info/. Enter F328 in the search box to learn more about \"Aortic Valve Stenosis: Care Instructions. \" Current as of: January 27, 2016 Content Version: 11.1 © 9906-3788 [employer]. Care instructions adapted under license by [employer] (which disclaims liability or warranty for this information). If you have questions about a medical condition or this instruction, always ask your healthcare professional. [address] any warranty or liability for your use of this information. Please provide this summary of care documentation to your next provider. Your primary care clinician is listed as Jeannine Seay. If you have any questions after today's visit, please call [phone]. 151